# Patient Record
Sex: FEMALE | Race: WHITE | NOT HISPANIC OR LATINO | Employment: FULL TIME | URBAN - METROPOLITAN AREA
[De-identification: names, ages, dates, MRNs, and addresses within clinical notes are randomized per-mention and may not be internally consistent; named-entity substitution may affect disease eponyms.]

---

## 2017-03-10 ENCOUNTER — HOSPITAL ENCOUNTER (EMERGENCY)
Facility: CLINIC | Age: 67
Discharge: HOME | End: 2017-03-10
Attending: PHYSICIAN ASSISTANT | Admitting: PHYSICIAN ASSISTANT
Payer: COMMERCIAL

## 2017-03-10 VITALS — SYSTOLIC BLOOD PRESSURE: 184 MMHG | OXYGEN SATURATION: 98 % | DIASTOLIC BLOOD PRESSURE: 73 MMHG | TEMPERATURE: 97.7 F

## 2017-03-10 DIAGNOSIS — S33.5XXA SPRAIN OF LUMBAR REGION, INITIAL ENCOUNTER: ICD-10-CM

## 2017-03-10 PROCEDURE — 99213 OFFICE O/P EST LOW 20 MIN: CPT | Performed by: PHYSICIAN ASSISTANT

## 2017-03-10 PROCEDURE — 99212 OFFICE O/P EST SF 10 MIN: CPT

## 2017-03-10 RX ORDER — CYCLOBENZAPRINE HCL 10 MG
10 TABLET ORAL 3 TIMES DAILY PRN
Qty: 20 TABLET | Refills: 0 | Status: SHIPPED | OUTPATIENT
Start: 2017-03-10 | End: 2017-03-16

## 2017-03-10 RX ORDER — TRAMADOL HYDROCHLORIDE 50 MG/1
50-100 TABLET ORAL EVERY 6 HOURS PRN
Qty: 15 TABLET | Refills: 0 | Status: SHIPPED | OUTPATIENT
Start: 2017-03-10 | End: 2017-03-15

## 2017-03-10 NOTE — ED PROVIDER NOTES
History     Chief Complaint   Patient presents with     Back Pain     low back     HPI  Arabella Rodríguez is a 66 year old female who has 2 weeks of left lower back strained.  She was lifting a bucket back then.  She has had intermittent symptoms.  No BB sxs.  She has no fevers.  Alleve has not helped.    I have reviewed the Medications, Allergies, Past Medical and Surgical History, and Social History in the Epic system.    Review of Systems    Physical Exam   BP: 184/73  Heart Rate: 77  Temp: 97.7  F (36.5  C)  SpO2: 98 %  Physical Exam  Gen: 66 year old female appears stated age  Eyes: Equal and round  Head: NC, AT, GCS 15  ENT: Canal and nares patent  Extremity: MAEW  Skin: Warm and dry  Psych: Mood and affect normal  Neuro: CN II-XII grossly in tact  Back: ambulates on heels, toes, and squats, DTRs 2+ symmetric on the patella, LE sensation in tact, pos subjective rectal tone  ED Course     ED Course     Procedures        I think an xray at the patient's family doctor can be arranged for this week.  I will put in an order.       Labs Ordered and Resulted from Time of ED Arrival Up to the Time of Departure from the ED - No data to display    Assessments & Plan (with Medical Decision Making)     I have reviewed the nursing notes.    I have reviewed the findings, diagnosis, plan and need for follow up with the patient.    New Prescriptions    CYCLOBENZAPRINE (FLEXERIL) 10 MG TABLET    Take 1 tablet (10 mg) by mouth 3 times daily as needed for muscle spasms    TRAMADOL (ULTRAM) 50 MG TABLET    Take 1-2 tablets ( mg) by mouth every 6 hours as needed for pain       Final diagnoses:   Sprain of lumbar region, initial encounter - 2 weeks ago, left lower back       3/10/2017   [facility] EMERGENCY DEPARTMENT     Juan Gutierrez PA-C  03/10/17 [id number]

## 2017-03-10 NOTE — ED NOTES
Patient reports injuring her lower back earlier this week when pulling a bucket of water and doing housework.

## 2017-03-10 NOTE — ED AVS SNAPSHOT
[facility] Emergency Department    [address] [address]    Phone:  [phone]    Fax:  [phone]                                       Arabella Rodríguez   MRN: [id number]    Department:  [facility] Emergency Department   Date of Visit:  3/10/2017           After Visit Summary Signature Page     I have received my discharge instructions, and my questions have been answered. I have discussed any challenges I see with this plan with the nurse or doctor.    ..........................................................................................................................................  Patient/Patient Representative Signature      ..........................................................................................................................................  Patient Representative Print Name and Relationship to Patient    ..................................................               ................................................  Date                                            Time    ..........................................................................................................................................  Reviewed by Signature/Title    ...................................................              ..............................................  Date                                                            Time

## 2017-03-10 NOTE — ED NOTES
Pt injured back last week lifting. Felt better then yesterday she cleaned house and pain is worse.

## 2017-03-13 ENCOUNTER — MYC MEDICAL ADVICE (OUTPATIENT)
Dept: FAMILY MEDICINE | Facility: CLINIC | Age: 67
End: 2017-03-13

## 2017-03-13 NOTE — TELEPHONE ENCOUNTER
Patient notified.  Appt made for 3/15/17 to see RN for BP check.    BP check to be done manually per patient request.    Maria Guadalupe SALAS Rn

## 2017-03-13 NOTE — TELEPHONE ENCOUNTER
I would first follow up with a nurse visit to check her blood pressure first.  Germain Kolb MD  Family Medicine

## 2017-03-13 NOTE — TELEPHONE ENCOUNTER
Patient was in the ED 3/10/17 for back pain.  Her Bp was elevated 180/73.    Her  is a nurse and taking her BP manually at home with it being in 150's /80's range  She is taking her Losartan HCTZ 50/12.5MG tablets daily, she is concerned if her BP medication should be adjusted.  She denies elevated BP is related to pain.  Her pain from her ER visit is much better.    Please advise.    Routing to provider.    Maria Guadalupe SALAS Rn

## 2017-03-15 ENCOUNTER — OFFICE VISIT (OUTPATIENT)
Dept: FAMILY MEDICINE | Facility: CLINIC | Age: 67
End: 2017-03-15
Payer: COMMERCIAL

## 2017-03-15 ENCOUNTER — TELEPHONE (OUTPATIENT)
Dept: FAMILY MEDICINE | Facility: CLINIC | Age: 67
End: 2017-03-15

## 2017-03-15 VITALS — DIASTOLIC BLOOD PRESSURE: 84 MMHG | SYSTOLIC BLOOD PRESSURE: 164 MMHG | HEART RATE: 72 BPM

## 2017-03-15 DIAGNOSIS — I10 ESSENTIAL HYPERTENSION: Primary | ICD-10-CM

## 2017-03-15 PROCEDURE — 99207 ZZC NO CHARGE NURSE ONLY: CPT

## 2017-03-15 NOTE — PROGRESS NOTES
Pt stopped into clinic for RN blood pressure check after noticing that her home blood pressures have been trending upward.     Today's readings are 164/78, pulse of 76 and   Recheck 5 minutes later 164/84, pulse of 72.     Pt was last seen in clinic for her annual exam 4/4/16.     Routed to provider for further instructions.     Evelin Jenkins RN         BP Readings from Last 3 Encounters:   03/15/17 164/84   03/10/17 184/73   10/05/16 163/89            Lab Results   Component Value Date     CR 0.76 05/31/2016            Lab Results   Component Value Date     POTASSIUM 4.3 05/31/2016

## 2017-03-15 NOTE — TELEPHONE ENCOUNTER
"Patient is called and offered an office visit to address these problems.  appt scheduled for 4/4/17 with Dr. Kolb.  Patient is concerned of her \"high blood pressure readings\".  I have tried to comfort the patient with the fact that although these readings are elevated they are not extremely high.  Patient asked if she has headache or blurred vision.  Yes I do get headaches.  Offered her a appt with another provider to be seen for her HTN.  \"No I will wait and see Dr. Kolb\".  Josefina PARRA RN    "

## 2017-03-15 NOTE — NURSING NOTE
Pt stopped into clinic for RN blood pressure check after noticing that her home blood pressures have been trending upward.    Today's readings are 164/78, pulse of 76 and   Recheck 5 minutes later 164/84, pulse of 72.    Pt was last seen in clinic for her annual exam 4/4/16.    Routed to provider for further instructions.    Evelin Jenkins RN    BP Readings from Last 3 Encounters:   03/15/17 164/84   03/10/17 184/73   10/05/16 163/89     Lab Results   Component Value Date    CR 0.76 05/31/2016        Lab Results   Component Value Date    POTASSIUM 4.3 05/31/2016           .

## 2017-03-15 NOTE — MR AVS SNAPSHOT
After Visit Summary   3/15/2017    Arabella Rodríguez    MRN: [id number]           Patient Information     Date Of Birth          1950        Visit Information        Provider Department      3/15/2017 9:15 AM PRUDENCE POTTER/JERRY AGUIAR [facility]        Today's Diagnoses     Essential hypertension    -  1       Follow-ups after your visit        Your next 10 appointments already scheduled     Apr 04, 2017  8:20 AM CDT   SHORT with Germain Kolb MD   [facility] ([facility])    [address] [place] [address] [phone]              Who to contact     If you have questions or need follow up information about today's clinic visit or your schedule please contact [facility] directly at [phone].  Normal or non-critical lab and imaging results will be communicated to you by MyChart, letter or phone within 4 business days after the clinic has received the results. If you do not hear from us within 7 days, please contact the clinic through MyChart or phone. If you have a critical or abnormal lab result, we will notify you by phone as soon as possible.  Submit refill requests through [employer] or call your pharmacy and they will forward the refill request to us. Please allow 3 business days for your refill to be completed.          Additional Information About Your Visit        MyChart Information     [employer] gives you secure access to your electronic health record. If you see a primary care provider, you can also send messages to your care team and make appointments. If you have questions, please call your primary care clinic.  If you do not have a primary care provider, please call [phone] and they will assist you.        Care EveryWhere ID     This is your Care EveryWhere ID. This could be used by other organizations to access your [place] medical records  [id number]        Your Vitals Were     Pulse                   72             Blood Pressure from Last 3 Encounters:   03/15/17 164/84   03/10/17 184/73   10/05/16 163/89    Weight from Last 3 Encounters:   09/14/16 123 lb (55.8 kg)   06/21/16 122 lb (55.3 kg)   05/31/16 122 lb 12.8 oz (55.7 kg)              Today, you had the following     No orders found for display       Primary Care Provider Office Phone # Fax #    Germain Kolb [phone]       [facility] [address] [address]        Thank you!     Thank you for choosing [facility]  for your care. Our goal is always to provide you with excellent care. Hearing back from our patients is one way we can continue to improve our services. Please take a few minutes to complete the written survey that you may receive in the mail after your visit with us. Thank you!             Your Updated Medication List - Protect others around you: Learn how to safely use, store and throw away your medicines at www.disposemymeds.org.          This list is accurate as of: 3/15/17  1:51 PM.  Always use your most recent med list.                   Brand Name Dispense Instructions for use    CALCIUM + D PO      1 TABLET ORALLY DAILY       [employer] FISH OIL 1200 MG Cpdr          cyclobenzaprine 10 MG tablet    FLEXERIL    20 tablet    Take 1 tablet (10 mg) by mouth 3 times daily as needed for muscle spasms       fluocinonide 0.05 % cream    LIDEX    60 g    Apply sparingly to affected area twice daily as needed.  Do not apply to face.       losartan-hydrochlorothiazide 50-12.5 MG per tablet    HYZAAR    90 tablet    Take 1 tablet by mouth daily Hold on file until needed       MULTI-VITAMIN PO      1 TABLET ORALLY DAILY       valACYclovir 1000 mg tablet    VALTREX    30 tablet    If you have a cold sore use 2 tab po bid for 2 days.  If you have the rash on your leg, 0.5 po twice for 3 days.

## 2017-03-27 ENCOUNTER — OFFICE VISIT (OUTPATIENT)
Dept: FAMILY MEDICINE | Facility: CLINIC | Age: 67
End: 2017-03-27
Payer: COMMERCIAL

## 2017-03-27 VITALS
DIASTOLIC BLOOD PRESSURE: 70 MMHG | TEMPERATURE: 97.7 F | SYSTOLIC BLOOD PRESSURE: 136 MMHG | HEART RATE: 76 BPM | BODY MASS INDEX: 23.19 KG/M2 | WEIGHT: 126 LBS | HEIGHT: 62 IN

## 2017-03-27 DIAGNOSIS — Z00.00 ENCOUNTER FOR ROUTINE ADULT HEALTH EXAMINATION WITHOUT ABNORMAL FINDINGS: Primary | ICD-10-CM

## 2017-03-27 DIAGNOSIS — I10 ESSENTIAL HYPERTENSION: ICD-10-CM

## 2017-03-27 DIAGNOSIS — E78.5 HYPERLIPIDEMIA LDL GOAL <130: ICD-10-CM

## 2017-03-27 DIAGNOSIS — Z23 NEED FOR VACCINATION: ICD-10-CM

## 2017-03-27 DIAGNOSIS — Z12.31 ENCOUNTER FOR SCREENING MAMMOGRAM FOR BREAST CANCER: ICD-10-CM

## 2017-03-27 DIAGNOSIS — B00.1 COLD SORE: ICD-10-CM

## 2017-03-27 LAB
ANION GAP SERPL CALCULATED.3IONS-SCNC: 7 MMOL/L (ref 3–14)
BUN SERPL-MCNC: 15 MG/DL (ref 7–30)
CALCIUM SERPL-MCNC: 9.8 MG/DL (ref 8.5–10.1)
CHLORIDE SERPL-SCNC: 98 MMOL/L (ref 94–109)
CHOLEST SERPL-MCNC: 276 MG/DL
CO2 SERPL-SCNC: 27 MMOL/L (ref 20–32)
CREAT SERPL-MCNC: 0.77 MG/DL (ref 0.52–1.04)
GFR SERPL CREATININE-BSD FRML MDRD: 76 ML/MIN/1.7M2
GLUCOSE SERPL-MCNC: 103 MG/DL (ref 70–99)
HDLC SERPL-MCNC: 71 MG/DL
LDLC SERPL CALC-MCNC: 186 MG/DL
NONHDLC SERPL-MCNC: 205 MG/DL
POTASSIUM SERPL-SCNC: 4.2 MMOL/L (ref 3.4–5.3)
SODIUM SERPL-SCNC: 132 MMOL/L (ref 133–144)
TRIGL SERPL-MCNC: 97 MG/DL

## 2017-03-27 PROCEDURE — 80048 BASIC METABOLIC PNL TOTAL CA: CPT | Performed by: FAMILY MEDICINE

## 2017-03-27 PROCEDURE — 80061 LIPID PANEL: CPT | Performed by: FAMILY MEDICINE

## 2017-03-27 PROCEDURE — 90670 PCV13 VACCINE IM: CPT | Performed by: FAMILY MEDICINE

## 2017-03-27 PROCEDURE — 36415 COLL VENOUS BLD VENIPUNCTURE: CPT | Performed by: FAMILY MEDICINE

## 2017-03-27 PROCEDURE — 90471 IMMUNIZATION ADMIN: CPT | Performed by: FAMILY MEDICINE

## 2017-03-27 PROCEDURE — 99397 PER PM REEVAL EST PAT 65+ YR: CPT | Mod: 25 | Performed by: FAMILY MEDICINE

## 2017-03-27 PROCEDURE — 99212 OFFICE O/P EST SF 10 MIN: CPT | Mod: 25 | Performed by: FAMILY MEDICINE

## 2017-03-27 RX ORDER — VALACYCLOVIR HYDROCHLORIDE 1 G/1
TABLET, FILM COATED ORAL
Qty: 30 TABLET | Refills: 3 | Status: SHIPPED | OUTPATIENT
Start: 2017-03-27 | End: 2018-03-27

## 2017-03-27 RX ORDER — LOSARTAN POTASSIUM 50 MG/1
50 TABLET ORAL DAILY
Qty: 90 TABLET | Refills: 3 | Status: SHIPPED | OUTPATIENT
Start: 2017-03-27 | End: 2018-03-21

## 2017-03-27 RX ORDER — HYDROCHLOROTHIAZIDE 25 MG/1
25 TABLET ORAL DAILY
Qty: 90 TABLET | Refills: 3 | Status: SHIPPED | OUTPATIENT
Start: 2017-03-27 | End: 2018-03-21

## 2017-03-27 NOTE — MR AVS SNAPSHOT
After Visit Summary   3/27/2017    Arabella Rodríguez    MRN: [id number]           Patient Information     Date Of Birth          1950        Visit Information        Provider Department      3/27/2017 10:40 AM Germain Kolb MD [facility]        Today's Diagnoses     Encounter for routine adult health examination without abnormal findings    -  1    Essential hypertension        Hyperlipidemia LDL goal <130        Need for vaccination        Encounter for screening mammogram for breast cancer        Cold sore          Care Instructions    Please go to lab.    I increased your medication for your blood pressure.  You will be still taking 50 mg of losartan medication however I increased your hydrochlorothiazide to 25 mg a day.    Please make a nurse visit in 3 weeks to recheck your blood pressure.    Please get your mammogram done in the near future.          Thank you for choosing [facility].  You may be receiving a survey in the mail from [employer] regarding your visit today.  Please take a few minutes to complete and return the survey to let us know how we are doing.      If you have questions or concerns, please contact us via [employer] or you can contact your care team at [phone].    Our Clinic hours are:  Monday 6:40 am  to 7:00 pm  Tuesday -Friday 6:40 am to 5:00 pm    The [place] outpatient lab hours are:  Monday - Friday 6:10 am to 4:45 pm  Saturdays 7:00 am to 11:00 am  Appointments are required, call [phone]    If you have clinical questions after hours or would like to schedule an appointment,  call the clinic at [phone].    Preventive Health Recommendations  Female Ages 65 +    Yearly exam:     See your health care provider every year in order to  o Review health changes.   o Discuss preventive care.    o Review your medicines if your doctor has prescribed any.      You no longer need a yearly Pap test unless you've had an abnormal Pap test in the  past 10 years. If you have vaginal symptoms, such as bleeding or discharge, be sure to talk with your provider about a Pap test.      Every 1 to 2 years, have a mammogram.  If you are over 69, talk with your health care provider about whether or not you want to continue having screening mammograms.      Every 10 years, have a colonoscopy. Or, have a yearly FIT test (stool test). These exams will check for colon cancer.       Have a cholesterol test every 5 years, or more often if your doctor advises it.       Have a diabetes test (fasting glucose) every three years. If you are at risk for diabetes, you should have this test more often.       At age 65, have a bone density scan (DEXA) to check for osteoporosis (brittle bone disease).    Shots:    Get a flu shot each year.    Get a tetanus shot every 10 years.    Talk to your doctor about your pneumonia vaccines. There are now two you should receive - Pneumovax (PPSV 23) and Prevnar (PCV 13).    Talk to your doctor about the shingles vaccine.    Talk to your doctor about the hepatitis B vaccine.    Nutrition:     Eat at least 5 servings of fruits and vegetables each day.      Eat whole-grain bread, whole-wheat pasta and brown rice instead of white grains and rice.      Talk to your provider about Calcium and Vitamin D.     Lifestyle    Exercise at least 150 minutes a week (30 minutes a day, 5 days a week). This will help you control your weight and prevent disease.      Limit alcohol to one drink per day.      No smoking.       Wear sunscreen to prevent skin cancer.       See your dentist twice a year for an exam and cleaning.      See your eye doctor every 1 to 2 years to screen for conditions such as glaucoma, macular degeneration, cataracts, etc         Follow-ups after your visit        Future tests that were ordered for you today     Open Future Orders        Priority Expected Expires Ordered    *MA Screening Digital Bilateral Routine  3/27/2018 3/27/2017        "     Who to contact     If you have questions or need follow up information about today's clinic visit or your schedule please contact [facility] directly at [phone].  Normal or non-critical lab and imaging results will be communicated to you by MyChart, letter or phone within 4 business days after the clinic has received the results. If you do not hear from us within 7 days, please contact the clinic through [employer] or phone. If you have a critical or abnormal lab result, we will notify you by phone as soon as possible.  Submit refill requests through [employer] or call your pharmacy and they will forward the refill request to us. Please allow 3 business days for your refill to be completed.          Additional Information About Your Visit        [employer] Information     [employer] gives you secure access to your electronic health record. If you see a primary care provider, you can also send messages to your care team and make appointments. If you have questions, please call your primary care clinic.  If you do not have a primary care provider, please call [phone] and they will assist you.        Care EveryWhere ID     This is your Care EveryWhere ID. This could be used by other organizations to access your [place] medical records  [id number]        Your Vitals Were     Pulse Temperature Height BMI (Body Mass Index)          76 97.7  F (36.5  C) (Tympanic) 5' 2.25\" (1.581 m) 22.86 kg/m2         Blood Pressure from Last 3 Encounters:   03/27/17 136/70   03/15/17 164/84   03/10/17 184/73    Weight from Last 3 Encounters:   03/27/17 126 lb (57.2 kg)   09/14/16 123 lb (55.8 kg)   06/21/16 122 lb (55.3 kg)              We Performed the Following     1st  Administration  [21168]     Basic metabolic panel     LIPID REFLEX TO DIRECT LDL PANEL     Pneumococcal vaccine 13 valent PCV13 IM (Prevnar) [72574]          Today's Medication Changes          These changes are accurate as of: 3/27/17 11:19 AM.  If " you have any questions, ask your nurse or doctor.               Start taking these medicines.        Dose/Directions    hydrochlorothiazide 25 MG tablet   Commonly known as:  HYDRODIURIL   Used for:  Essential hypertension   Started by:  Germain Kolb MD        Dose:  25 mg   Take 1 tablet (25 mg) by mouth daily   Quantity:  90 tablet   Refills:  3       losartan 50 MG tablet   Commonly known as:  COZAAR   Used for:  Essential hypertension   Started by:  Germain Kolb MD        Dose:  50 mg   Take 1 tablet (50 mg) by mouth daily   Quantity:  90 tablet   Refills:  3         These medicines have changed or have updated prescriptions.        Dose/Directions    valACYclovir 1000 mg tablet   Commonly known as:  VALTREX   This may have changed:  additional instructions   Used for:  Cold sore   Changed by:  Germain Kolb MD        If you have a cold sore use 2 tab po bid for 2 days.  If you have the rash on your leg, 0.5 po twice for 3 days. Hold on file until needed   Quantity:  30 tablet   Refills:  3         Stop taking these medicines if you haven't already. Please contact your care team if you have questions.     losartan-hydrochlorothiazide 50-12.5 MG per tablet   Commonly known as:  HYZAAR   Stopped by:  Germain Kolb MD                Where to get your medicines      These medications were sent to [facility] [place], [place] - [address]  [address] [address]     Phone:  [phone]     hydrochlorothiazide 25 MG tablet    losartan 50 MG tablet    valACYclovir 1000 mg tablet                Primary Care Provider Office Phone # Fax #    Germain Kolb [phone]       [facility] [address] [place] [address]        Thank you!     Thank you for choosing [facility]  for your care. Our goal is always to provide you with excellent care. Hearing back from our patients is one way we can continue to improve  our services. Please take a few minutes to complete the written survey that you may receive in the mail after your visit with us. Thank you!             Your Updated Medication List - Protect others around you: Learn how to safely use, store and throw away your medicines at www.disposemymeds.org.          This list is accurate as of: 3/27/17 11:19 AM.  Always use your most recent med list.                   Brand Name Dispense Instructions for use    CALCIUM + D PO      1 TABLET ORALLY DAILY       [employer]S FISH OIL 1200 MG Cpdr          fluocinonide 0.05 % cream    LIDEX    60 g    Apply sparingly to affected area twice daily as needed.  Do not apply to face.       hydrochlorothiazide 25 MG tablet    HYDRODIURIL    90 tablet    Take 1 tablet (25 mg) by mouth daily       losartan 50 MG tablet    COZAAR    90 tablet    Take 1 tablet (50 mg) by mouth daily       MULTI-VITAMIN PO      1 TABLET ORALLY DAILY       valACYclovir 1000 mg tablet    VALTREX    30 tablet    If you have a cold sore use 2 tab po bid for 2 days.  If you have the rash on your leg, 0.5 po twice for 3 days. Hold on file until needed

## 2017-03-27 NOTE — PROGRESS NOTES
SUBJECTIVE:     CC: Arabella Rodríguez is an 66 year old woman who presents for preventive health visit.   Chief Complaint   Patient presents with     Physical       Healthy Habits:    Do you get at least three servings of calcium containing foods daily (dairy, green leafy vegetables, etc.)? no, taking calcium and/or vitamin D supplement    Amount of exercise or daily activities, outside of work: 3 day(s) per week    Problems taking medications regularly No    Medication side effects: No    Have you had an eye exam in the past two years? yes    Do you see a dentist twice per year? yes    Do you have sleep apnea, excessive snoring or daytime drowsiness?no          Today's PHQ-2 Score:   PHQ-2 ( 1999 Pf[employer]) 3/27/2017 4/4/2016   Q1: Little interest or pleasure in doing things 0 0   Q2: Feeling down, depressed or hopeless 0 0   PHQ-2 Score 0 0   Little interest or pleasure in doing things - -   Feeling down, depressed or hopeless - -   PHQ-2 Score - -       Abuse: Current or Past(Physical, Sexual or Emotional)- No  Do you feel safe in your environment - Yes    Social History   Substance Use Topics     Smoking status: Former Smoker     Packs/day: 0.50     Years: 9.00     Types: Cigarettes     Start date: 10/1/1972     Quit date: 5/1/1981     Smokeless tobacco: Never Used      Comment: I quit in 1981     Alcohol use Yes      Comment: 1 o 2 then switch  to na     The patient does not drink >3 drinks per day nor >7 drinks per week.    Recent Labs   Lab Test  04/04/16   0910  03/31/15   1015  03/11/14   0942   CHOL  246*  214*  271*   HDL  57  60  61   LDL  163*  134*  181*   TRIG  129  98  147   CHOLHDLRATIO   --   3.6  4.0   NHDL  189*   --    --      The 10-year ASCVD risk score (Yamiletguilherme JEFFRIES Jr, et al., 2013) is: 9.2%    Values used to calculate the score:      Age: 66 years      Sex: Female      Is Non- : No      Diabetic: No      Tobacco smoker: No      Systolic Blood Pressure: 136 mmHg      Is BP  "treated: Yes      HDL Cholesterol: 57 mg/dL      Total Cholesterol: 246 mg/dL  Patient is eligible for use of low-dose aspirin for primary prevention of heart attack and stroke.  Provider has discussed aspirin with patient and our decision was:     Not Indicated:  Daily low-dose aspirin recommended for primary prevention, patient not eligible.      Reviewed orders with patient.  Reviewed health maintenance and updated orders accordingly - Yes    Mammo Decision Support:  Patient over age 50, mutual decision to screen reflected in health maintenance.    Pertinent mammograms are reviewed under the imaging tab.  History of abnormal Pap smear: NO - age 30- 65 PAP every 3 years recommended    Reviewed and updated as needed this visit by clinical staff  Tobacco  Allergies  Med Hx  Surg Hx  Fam Hx  Soc Hx        Reviewed and updated as needed this visit by Provider            ROS:  Review Of Systems  Skin: negative  Eyes: negative  Ears/Nose/Throat: negative  Respiratory: No shortness of breath, dyspnea on exertion, cough, or hemoptysis  Cardiovascular: negative  Gastrointestinal: negative  Genitourinary: negative  Musculoskeletal: negative  Neurologic: negative  Psychiatric: negative  Hematologic/Lymphatic/Immunologic: negative  Endocrine: negative      Problem list, Medication list, Allergies, and Medical/Social/Surgical histories reviewed in [facility] and updated as appropriate.  OBJECTIVE:     /70 (Cuff Size: Adult Regular)  Pulse 76  Temp 97.7  F (36.5  C) (Tympanic)  Ht 5' 2.25\" (1.581 m)  Wt 126 lb (57.2 kg)  BMI 22.86 kg/m2  EXAM:  GENERAL: healthy, alert and no distress  EYES: Eyes grossly normal to inspection, PERRL and conjunctivae and sclerae normal  HENT: ear canals and TM's normal, nose and mouth without ulcers or lesions  NECK: no adenopathy, no asymmetry, masses, or scars and thyroid normal to palpation  RESP: lungs clear to auscultation - no rales, rhonchi or wheezes  BREAST: normal without " masses, tenderness or nipple discharge and no palpable axillary masses or adenopathy  CV: regular rate and rhythm, normal S1 S2, no S3 or S4, no murmur, click or rub, no peripheral edema and peripheral pulses strong  ABDOMEN: soft, nontender, no hepatosplenomegaly, no masses and bowel sounds normal   (female): not examined  MS: no gross musculoskeletal defects noted, no edema  SKIN: no suspicious lesions or rashes  NEURO: Normal strength and tone, mentation intact and speech normal  PSYCH: mentation appears normal, affect normal/bright  LYMPH: anterior cervical: no adenopathy  posterior cervical: no adenopathy    ASSESSMENT/PLAN:     (Z00.00) Encounter for routine adult health examination without abnormal findings  (primary encounter diagnosis)  Comment: Discussed healthy lifestyle and preventative cares.    Plan:     (I10) Essential hypertension  Comment: not ideally controlled and per her home readings higher, recommend to increase med to help with better control and follow up with nurse visit. If not controlled consider increasing losartan to 75 mg a day.  Plan: Basic metabolic panel, losartan (COZAAR) 50 MG         tablet, hydrochlorothiazide (HYDRODIURIL) 25 MG        tablet            (E78.5) Hyperlipidemia LDL goal <130  Comment:   Plan: LIPID REFLEX TO DIRECT LDL PANEL            (Z23) Need for vaccination  Comment:   Plan: 1st  Administration  [29810], Pneumococcal         vaccine 13 valent PCV13 IM (Prevnar) [55352]            (Z12.31) Encounter for screening mammogram for breast cancer  Comment:   Plan: *MA Screening Digital Bilateral            (B00.1) Cold sore  Comment: refilled med  Plan: valACYclovir (VALTREX) 1000 mg tablet              COUNSELING:   Reviewed preventive health counseling, as reflected in patient instructions       Regular exercise       Healthy diet/nutrition       Vision screening       Hearing screening       Colon cancer screening         reports that she quit smoking about 35  "years ago. Her smoking use included Cigarettes. She started smoking about 44 years ago. She has a 4.50 pack-year smoking history. She has never used smokeless tobacco.    Estimated body mass index is 22.86 kg/(m^2) as calculated from the following:    Height as of this encounter: 5' 2.25\" (1.581 m).    Weight as of this encounter: 126 lb (57.2 kg).       Counseling Resources:  ATP IV Guidelines  Pooled Cohorts Equation Calculator  Breast Cancer Risk Calculator  FRAX Risk Assessment  ICSI Preventive Guidelines  Dietary Guidelines for Americans, 2010  USDA's MyPlate  ASA Prophylaxis  Lung CA Screening    Germain Kolb MD  [facility]  "

## 2017-03-27 NOTE — PATIENT INSTRUCTIONS
Please go to lab.    I increased your medication for your blood pressure.  You will be still taking 50 mg of losartan medication however I increased your hydrochlorothiazide to 25 mg a day.    Please make a nurse visit in 3 weeks to recheck your blood pressure.    Please get your mammogram done in the near future.          Thank you for choosing [facility].  You may be receiving a survey in the mail from Vitor Boyer regarding your visit today.  Please take a few minutes to complete and return the survey to let us know how we are doing.      If you have questions or concerns, please contact us via E-House or you can contact your care team at [phone].    Our Clinic hours are:  Monday 6:40 am  to 7:00 pm  Tuesday -Friday 6:40 am to 5:00 pm    The [place] outpatient lab hours are:  Monday - Friday 6:10 am to 4:45 pm  Saturdays 7:00 am to 11:00 am  Appointments are required, call [phone]    If you have clinical questions after hours or would like to schedule an appointment,  call the clinic at [phone].    Preventive Health Recommendations  Female Ages 65 +    Yearly exam:     See your health care provider every year in order to  o Review health changes.   o Discuss preventive care.    o Review your medicines if your doctor has prescribed any.      You no longer need a yearly Pap test unless you've had an abnormal Pap test in the past 10 years. If you have vaginal symptoms, such as bleeding or discharge, be sure to talk with your provider about a Pap test.      Every 1 to 2 years, have a mammogram.  If you are over 69, talk with your health care provider about whether or not you want to continue having screening mammograms.      Every 10 years, have a colonoscopy. Or, have a yearly FIT test (stool test). These exams will check for colon cancer.       Have a cholesterol test every 5 years, or more often if your doctor advises it.       Have a diabetes test (fasting glucose) every three years. If you  are at risk for diabetes, you should have this test more often.       At age 65, have a bone density scan (DEXA) to check for osteoporosis (brittle bone disease).    Shots:    Get a flu shot each year.    Get a tetanus shot every 10 years.    Talk to your doctor about your pneumonia vaccines. There are now two you should receive - Pneumovax (PPSV 23) and Prevnar (PCV 13).    Talk to your doctor about the shingles vaccine.    Talk to your doctor about the hepatitis B vaccine.    Nutrition:     Eat at least 5 servings of fruits and vegetables each day.      Eat whole-grain bread, whole-wheat pasta and brown rice instead of white grains and rice.      Talk to your provider about Calcium and Vitamin D.     Lifestyle    Exercise at least 150 minutes a week (30 minutes a day, 5 days a week). This will help you control your weight and prevent disease.      Limit alcohol to one drink per day.      No smoking.       Wear sunscreen to prevent skin cancer.       See your dentist twice a year for an exam and cleaning.      See your eye doctor every 1 to 2 years to screen for conditions such as glaucoma, macular degeneration, cataracts, etc

## 2017-03-27 NOTE — NURSING NOTE
"Chief Complaint   Patient presents with     Physical       Initial /78 (Cuff Size: Adult Regular)  Pulse 76  Temp 97.7  F (36.5  C) (Tympanic)  Ht 5' 2.25\" (1.581 m)  Wt 126 lb (57.2 kg)  BMI 22.86 kg/m2 Estimated body mass index is 22.86 kg/(m^2) as calculated from the following:    Height as of this encounter: 5' 2.25\" (1.581 m).    Weight as of this encounter: 126 lb (57.2 kg).  Medication Reconciliation: complete   ANT [facility] ([facility])  Screening Questionnaire for Adult Immunization    Are you sick today?   No   Do you have allergies to medications, food, a vaccine component or latex?   No   Have you ever had a serious reaction after receiving a vaccination?   No   Do you have a long-term health problem with heart disease, lung disease, asthma, kidney disease, metabolic disease (e.g. diabetes), anemia, or other blood disorder?   No   Do you have cancer, leukemia, HIV/AIDS, or any other immune system problem?   No   In the past 3 months, have you taken medications that affect  your immune system, such as prednisone, other steroids, or anticancer drugs; drugs for the treatment of rheumatoid arthritis, Crohn s disease, or psoriasis; or have you had radiation treatments?   No   Have you had a seizure, or a brain or other nervous system problem?   No   During the past year, have you received a transfusion of blood or blood     products, or been given immune (gamma) globulin or antiviral drug?   No   For women: Are you pregnant or is there a chance you could become        pregnant during the next month?   No   Have you received any vaccinations in the past 4 weeks?   No     Immunization questionnaire answers were all negative.      Per orders of Dr. Kolb, injection of PCV13 given by Maria E Clemente. Patient instructed to remain in clinic for 20 minutes afterwards, and to report any adverse reaction to me immediately.       Screening performed by Maria E Clemente on 3/27/2017 at 10:47 AM.    "

## 2017-04-17 ENCOUNTER — ALLIED HEALTH/NURSE VISIT (OUTPATIENT)
Dept: FAMILY MEDICINE | Facility: CLINIC | Age: 67
End: 2017-04-17
Payer: COMMERCIAL

## 2017-04-17 VITALS — SYSTOLIC BLOOD PRESSURE: 144 MMHG | HEART RATE: 68 BPM | DIASTOLIC BLOOD PRESSURE: 72 MMHG

## 2017-04-17 DIAGNOSIS — I10 ESSENTIAL HYPERTENSION: Primary | ICD-10-CM

## 2017-04-17 PROCEDURE — 99207 ZZC NO CHARGE NURSE ONLY: CPT

## 2017-04-17 NOTE — NURSING NOTE
Pt returns for RN blood pressure recheck.  Today's reading is 156/70, pulse of 72.  Recheck 3 min later is 145/72, pulse of 68.    Will route to PCP in a telephone note for further instructions since pt remains above goal.    BP Readings from Last 6 Encounters:   04/17/17 144/72   03/27/17 136/70   03/15/17 164/84   03/10/17 184/73   10/05/16 163/89   09/14/16 120/80     Lab Results   Component Value Date    CR 0.77 03/27/2017     Lab Results   Component Value Date    POTASSIUM 4.2 03/27/2017

## 2017-04-17 NOTE — MR AVS SNAPSHOT
After Visit Summary   4/17/2017    Arabella Rodríguez    MRN: [id number]           Patient Information     Date Of Birth          1950        Visit Information        Provider Department      4/17/2017 1:45 PM PRUDENCE ADAMS FP/IM RN [facility]        Today's Diagnoses     Essential hypertension    -  1       Follow-ups after your visit        Your next 10 appointments already scheduled     Apr 17, 2017  1:45 PM CDT   Nurse Only with [facility]IM RN   [facility]    [address] [id number] [phone]              Who to contact     If you have questions or need follow up information about today's clinic visit or your schedule please contact [facility] directly at [phone].  Normal or non-critical lab and imaging results will be communicated to you by MyChart, letter or phone within 4 business days after the clinic has received the results. If you do not hear from us within 7 days, please contact the clinic through MyChart or phone. If you have a critical or abnormal lab result, we will notify you by phone as soon as possible.  Submit refill requests through [employer] or call your pharmacy and they will forward the refill request to us. Please allow 3 business days for your refill to be completed.          Additional Information About Your Visit        MyChart Information     Litepoint gives you secure access to your electronic health record. If you see a primary care provider, you can also send messages to your care team and make appointments. If you have questions, please call your primary care clinic.  If you do not have a primary care provider, please call [phone] and they will assist you.        Care EveryWhere ID     This is your Care EveryWhere ID. This could be used by other organizations to access your [place] medical records  [id number]        Your Vitals Were     Pulse                   68             Blood Pressure from Last 3 Encounters:   04/17/17 144/72   03/27/17 136/70   03/15/17 164/84    Weight from Last 3 Encounters:   03/27/17 126 lb (57.2 kg)   09/14/16 123 lb (55.8 kg)   06/21/16 122 lb (55.3 kg)              Today, you had the following     No orders found for display       Primary Care Provider Office Phone # Fax #    Germain Kolb [phone]       [facility] [address] [address]        Thank you!     Thank you for choosing [facility]  for your care. Our goal is always to provide you with excellent care. Hearing back from our patients is one way we can continue to improve our services. Please take a few minutes to complete the written survey that you may receive in the mail after your visit with us. Thank you!             Your Updated Medication List - Protect others around you: Learn how to safely use, store and throw away your medicines at www.disposemymeds.org.          This list is accurate as of: 4/17/17  1:26 PM.  Always use your most recent med list.                   Brand Name Dispense Instructions for use    CALCIUM + D PO      1 TABLET ORALLY DAILY       [employer] FISH OIL 1200 MG Cpdr          fluocinonide 0.05 % cream    LIDEX    60 g    Apply sparingly to affected area twice daily as needed.  Do not apply to face.       hydrochlorothiazide 25 MG tablet    HYDRODIURIL    90 tablet    Take 1 tablet (25 mg) by mouth daily       losartan 50 MG tablet    COZAAR    90 tablet    Take 1 tablet (50 mg) by mouth daily       MULTI-VITAMIN PO      1 TABLET ORALLY DAILY       valACYclovir 1000 mg tablet    VALTREX    30 tablet    If you have a cold sore use 2 tab po bid for 2 days.  If you have the rash on your leg, 0.5 po twice for 3 days. Hold on file until needed

## 2017-12-20 ENCOUNTER — OFFICE VISIT (OUTPATIENT)
Dept: DERMATOLOGY | Facility: CLINIC | Age: 67
End: 2017-12-20
Payer: COMMERCIAL

## 2017-12-20 ENCOUNTER — OFFICE VISIT (OUTPATIENT)
Dept: FAMILY MEDICINE | Facility: CLINIC | Age: 67
End: 2017-12-20
Payer: COMMERCIAL

## 2017-12-20 VITALS — HEART RATE: 78 BPM | SYSTOLIC BLOOD PRESSURE: 152 MMHG | OXYGEN SATURATION: 97 % | DIASTOLIC BLOOD PRESSURE: 75 MMHG

## 2017-12-20 VITALS
SYSTOLIC BLOOD PRESSURE: 122 MMHG | HEART RATE: 76 BPM | HEIGHT: 62 IN | BODY MASS INDEX: 21.75 KG/M2 | OXYGEN SATURATION: 99 % | DIASTOLIC BLOOD PRESSURE: 74 MMHG | TEMPERATURE: 98.1 F | WEIGHT: 118.2 LBS

## 2017-12-20 DIAGNOSIS — D18.00 ANGIOMA: ICD-10-CM

## 2017-12-20 DIAGNOSIS — R10.11 ABDOMINAL DISCOMFORT IN RIGHT UPPER QUADRANT: Primary | ICD-10-CM

## 2017-12-20 DIAGNOSIS — L82.1 SK (SEBORRHEIC KERATOSIS): Primary | ICD-10-CM

## 2017-12-20 DIAGNOSIS — D22.9 DERMAL AND EPIDERMAL NEVUS: ICD-10-CM

## 2017-12-20 DIAGNOSIS — L81.4 LENTIGO: ICD-10-CM

## 2017-12-20 LAB
ALBUMIN SERPL-MCNC: 4.6 G/DL (ref 3.4–5)
ALP SERPL-CCNC: 53 U/L (ref 40–150)
ALT SERPL W P-5'-P-CCNC: 25 U/L (ref 0–50)
AST SERPL W P-5'-P-CCNC: 26 U/L (ref 0–45)
BASOPHILS # BLD AUTO: 0 10E9/L (ref 0–0.2)
BASOPHILS NFR BLD AUTO: 0.5 %
BILIRUB DIRECT SERPL-MCNC: 0.1 MG/DL (ref 0–0.2)
BILIRUB SERPL-MCNC: 0.6 MG/DL (ref 0.2–1.3)
DIFFERENTIAL METHOD BLD: NORMAL
EOSINOPHIL # BLD AUTO: 0.1 10E9/L (ref 0–0.7)
EOSINOPHIL NFR BLD AUTO: 1.2 %
ERYTHROCYTE [DISTWIDTH] IN BLOOD BY AUTOMATED COUNT: 11.7 % (ref 10–15)
HCT VFR BLD AUTO: 38.6 % (ref 35–47)
HGB BLD-MCNC: 13.3 G/DL (ref 11.7–15.7)
LYMPHOCYTES # BLD AUTO: 2.2 10E9/L (ref 0.8–5.3)
LYMPHOCYTES NFR BLD AUTO: 29 %
MCH RBC QN AUTO: 30.6 PG (ref 26.5–33)
MCHC RBC AUTO-ENTMCNC: 34.5 G/DL (ref 31.5–36.5)
MCV RBC AUTO: 89 FL (ref 78–100)
MONOCYTES # BLD AUTO: 0.7 10E9/L (ref 0–1.3)
MONOCYTES NFR BLD AUTO: 8.9 %
NEUTROPHILS # BLD AUTO: 4.5 10E9/L (ref 1.6–8.3)
NEUTROPHILS NFR BLD AUTO: 60.4 %
PLATELET # BLD AUTO: 306 10E9/L (ref 150–450)
PROT SERPL-MCNC: 8 G/DL (ref 6.8–8.8)
RBC # BLD AUTO: 4.34 10E12/L (ref 3.8–5.2)
WBC # BLD AUTO: 7.5 10E9/L (ref 4–11)

## 2017-12-20 PROCEDURE — 36415 COLL VENOUS BLD VENIPUNCTURE: CPT | Performed by: FAMILY MEDICINE

## 2017-12-20 PROCEDURE — 99214 OFFICE O/P EST MOD 30 MIN: CPT | Performed by: FAMILY MEDICINE

## 2017-12-20 PROCEDURE — 99213 OFFICE O/P EST LOW 20 MIN: CPT | Performed by: DERMATOLOGY

## 2017-12-20 PROCEDURE — 85025 COMPLETE CBC W/AUTO DIFF WBC: CPT | Performed by: FAMILY MEDICINE

## 2017-12-20 PROCEDURE — 80076 HEPATIC FUNCTION PANEL: CPT | Performed by: FAMILY MEDICINE

## 2017-12-20 NOTE — PROGRESS NOTES
Arabella Rodríguez is a 67 year old year old female patient here today for brown spot son trunk.  She denies any new or changing lesions.  Patient reports the following previous treatments none.  Patient reports the following modifying factors none.  Associated symptoms: none.  Patient has no other skin complaints today.  Remainder of the HPI, Meds, PMH, Allergies, FH, and SH was reviewed in chart.      Past Medical History:   Diagnosis Date     Hypertension      Osteopenia      Thyroid nodules [facility]     benign        Past Surgical History:   Procedure Laterality Date     ABDOMEN SURGERY  1986         BIOPSY  2014    Thyroid     C ANESTH, SECTION       COLONOSCOPY       EXCISE MASS WRIST Left 2016    Procedure: EXCISE MASS WRIST;  Surgeon: Germain Hull MD;  Location: [facility] OR     TONSILLECTOMY          Family History   Problem Relation Age of Onset     Lipids Mother      Arthritis Mother      Hypertension Mother      Hyperlipidemia Mother      Osteopenia Mother      Hypertension Father      Hyperlipidemia Father      Prostate Cancer Father      Hypertension Paternal Grandfather      Neurologic Disorder Brother      brain tumor     Other Cancer Brother       Brain Surgery/ Brain Surgery     Depression Sister      early      Depression Niece      DIABETES No family hx of        Social History     Social History     Marital status:      Spouse name: N/A     Number of children: N/A     Years of education: N/A     Occupational History      [employer]     Social History Main Topics     Smoking status: Former Smoker     Packs/day: 0.50     Years: 9.00     Types: Cigarettes     Start date: 10/1/1972     Quit date: 1981     Smokeless tobacco: Never Used      Comment: I quit in      Alcohol use Yes      Comment: 1 o 2 then switch  to na     Drug use: No     Sexual activity: Not Currently     Partners: Male     Other Topics Concern      Parent/Sibling W/ Cabg, Mi Or Angioplasty Before 65f 55m? No      Service No     Blood Transfusions No     Caffeine Concern Yes     3 cups cofffee a day     Occupational Exposure No     Hobby Hazards No     Sleep Concern No     Stress Concern No     Weight Concern No     Special Diet Yes     calcium with D one a day     Back Care No     Exercise Yes     couple times a wk     Bike Helmet No     Seat Belt Yes     Self-Exams No     Social History Narrative       Outpatient Encounter Prescriptions as of 12/20/2017   Medication Sig Dispense Refill     losartan (COZAAR) 50 MG tablet Take 1 tablet (50 mg) by mouth daily 90 tablet 3     hydrochlorothiazide (HYDRODIURIL) 25 MG tablet Take 1 tablet (25 mg) by mouth daily 90 tablet 3     Omega-3 Fatty Acids ([employer]S FISH OIL) 1200 MG CPDR Take 1 capsule by mouth daily        CALCIUM + D OR 1 TABLET ORALLY DAILY       MULTI-VITAMIN OR 1 TABLET ORALLY DAILY       valACYclovir (VALTREX) 1000 mg tablet If you have a cold sore use 2 tab po bid for 2 days.  If you have the rash on your leg, 0.5 po twice for 3 days. Hold on file until needed (Patient not taking: Reported on 12/20/2017) 30 tablet 3     fluocinonide (LIDEX) 0.05 % cream Apply sparingly to affected area twice daily as needed.  Do not apply to face. (Patient not taking: Reported on 12/20/2017) 60 g 1     No facility-administered encounter medications on file as of 12/20/2017.              Review Of Systems  Skin: As above  Eyes: negative  Ears/Nose/Throat: negative  Respiratory: No shortness of breath, dyspnea on exertion, cough, or hemoptysis  Cardiovascular: negative  Gastrointestinal: negative  Genitourinary: negative  Musculoskeletal: negative  Neurologic: negative  Psychiatric: negative  Hematologic/Lymphatic/Immunologic: negative  Endocrine: negative      O:   NAD, WDWN, Alert & Oriented, Mood & Affect wnl, Vitals stable   Here today alone   /75  Pulse 78  SpO2 97%   General appearance normal   Vitals  stable   Alert, oriented and in no acute distress      Following lymph nodes palpated: Occipital, Cervical, Supraclavicular no lad   Stuck on papules and brown macules on trunk and ext    Red papules on trunk and ext    Flesh colored papules on trunk         The remainder of the full exam was unremarkable; the following areas were examined:  conjunctiva/lids, oral mucosa, neck, peripheral vascular system, abdomen, lymph nodes, digits/nails, eccrine and apocrine glands, scalp/hair, face, neck, chest, abdomen, buttocks, back, RUE, LUE, RLE, LLE       Eyes: Conjunctivae/lids:Normal     ENT: Lips, buccal mucosa, tongue: normal    MSK:Normal    Cardiovascular: peripheral edema none    Pulm: Breathing Normal    Lymph Nodes: No Head and Neck Lymphadenopathy     Neuro/Psych: Orientation:Normal; Mood/Affect:Normal      A/P:  1. Seborrheic keratosis, lentigo, angioma, dermal nevus  BENIGN LESIONS DISCUSSED WITH PATIENT:  I discussed the specifics of tumor, prognosis, and genetics of benign lesions.  I explained that treatment of these lesions would be purely cosmetic and not medically neccessary.  I discussed with patient different removal options including excision, cautery and /or laser.      Nature and genetics of benign skin lesions dicussed with patient.  Signs and Symptoms of skin cancer discussed with patient.  Patient encouraged to perform monthly skin exams.  UV precautions reviewed with patient.  Skin care regimen reviewed with patient: Eliminate harsh soaps, i.e. Dial, zest, irsih spring; Mild soaps such as Cetaphil or Dove sensitive skin, avoid hot or cold showers, aggressive use of emollients including vanicream, cetaphil or cerave discussed with patient.    Risks of non-melanoma skin cancer discussed with patient   Return to clinic 12 months

## 2017-12-20 NOTE — NURSING NOTE
"Chief Complaint   Patient presents with     Rib Pain     Health Maintenance     reminded will be due for pappe in March        Initial /72 (BP Location: Right arm, Patient Position: Chair, Cuff Size: Adult Regular)  Pulse 76  Temp 98.1  F (36.7  C) (Tympanic)  Ht 5' 2.25\" (1.581 m)  Wt 118 lb 3.2 oz (53.6 kg)  SpO2 99%  BMI 21.45 kg/m2 Estimated body mass index is 21.45 kg/(m^2) as calculated from the following:    Height as of this encounter: 5' 2.25\" (1.581 m).    Weight as of this encounter: 118 lb 3.2 oz (53.6 kg).  Medication Reconciliation: complete    "

## 2017-12-20 NOTE — MR AVS SNAPSHOT
After Visit Summary   12/20/2017    Arabella Rodríguez    MRN: [id number]           Patient Information     Date Of Birth          1950        Visit Information        Provider Department      12/20/2017 9:20 AM Germain Kolb MD [facility]        Today's Diagnoses     Abdominal discomfort in right upper quadrant    -  1      Care Instructions    Please go to lab.    Please get an ultra sounds of the abdomen.          Thank you for choosing [facility].  You may be receiving a survey in the mail from [facility] regarding your visit today.  Please take a few minutes to complete and return the survey to let us know how we are doing.      If you have questions or concerns, please contact us via Blackboard or you can contact your care team at [phone].    Our Clinic hours are:  Monday 6:40 am  to 7:00 pm  Tuesday -Friday 6:40 am to 5:00 pm    The [place] outpatient lab hours are:  Monday - Friday 6:10 am to 4:45 pm  Saturdays 7:00 am to 11:00 am  Appointments are required, call [phone]    If you have clinical questions after hours or would like to schedule an appointment,  call the clinic at [phone].            Follow-ups after your visit        Your next 10 appointments already scheduled     Dec 20, 2017 10:15 AM CST   Return Visit with Elkin Hooks MD   [facility] ([facility]    [address] [id number] [phone]              Future tests that were ordered for you today     Open Future Orders        Priority Expected Expires Ordered    US Abdomen Complete Routine  12/20/2018 12/20/2017            Who to contact     If you have questions or need follow up information about today's clinic visit or your schedule please contact [facility] directly at [phone].  Normal or non-critical lab and imaging results will be communicated to you by MyChart, letter or phone within 4 business days  "after the clinic has received the results. If you do not hear from us within 7 days, please contact the clinic through [employer] or phone. If you have a critical or abnormal lab result, we will notify you by phone as soon as possible.  Submit refill requests through [employer] or call your pharmacy and they will forward the refill request to us. Please allow 3 business days for your refill to be completed.          Additional Information About Your Visit        [employer] Information     [employer] gives you secure access to your electronic health record. If you see a primary care provider, you can also send messages to your care team and make appointments. If you have questions, please call your primary care clinic.  If you do not have a primary care provider, please call [phone] and they will assist you.        Care EveryWhere ID     This is your Care EveryWhere ID. This could be used by other organizations to access your [place] medical records  [id number]        Your Vitals Were     Pulse Temperature Height Pulse Oximetry BMI (Body Mass Index)       76 98.1  F (36.7  C) (Tympanic) 5' 2.25\" (1.581 m) 99% 21.45 kg/m2        Blood Pressure from Last 3 Encounters:   12/20/17 122/74   04/17/17 144/72   03/27/17 136/70    Weight from Last 3 Encounters:   12/20/17 118 lb 3.2 oz (53.6 kg)   03/27/17 126 lb (57.2 kg)   09/14/16 123 lb (55.8 kg)              We Performed the Following     CBC with platelets differential     Hepatic panel        Primary Care Provider Office Phone # Fax #    Germain Kolb [phone] [address] [address]        Equal Access to Services     DEEJAY THRASHER : Hadii joanna Styles, darin yang, qamaria esther holguin. So [facility] [phone].    ATENCIÓN: Si habla español, tiene a veras disposición servicios gratuitos de asistencia lingüística. Llame al [phone].    We comply with applicable federal " civil rights laws and [place] laws. We do not discriminate on the basis of race, color, national origin, age, disability, sex, sexual orientation, or gender identity.            Thank you!     Thank you for choosing [facility]  for your care. Our goal is always to provide you with excellent care. Hearing back from our patients is one way we can continue to improve our services. Please take a few minutes to complete the written survey that you may receive in the mail after your visit with us. Thank you!             Your Updated Medication List - Protect others around you: Learn how to safely use, store and throw away your medicines at www.disposemymeds.org.          This list is accurate as of: 12/20/17 10:00 AM.  Always use your most recent med list.                   Brand Name Dispense Instructions for use Diagnosis    CALCIUM + D PO      1 TABLET ORALLY DAILY        CVS FISH OIL 1200 MG Cpdr      Take 1 capsule by mouth daily        fluocinonide 0.05 % cream    LIDEX    60 g    Apply sparingly to affected area twice daily as needed.  Do not apply to face.    Nummular dermatitis       hydrochlorothiazide 25 MG tablet    HYDRODIURIL    90 tablet    Take 1 tablet (25 mg) by mouth daily    Essential hypertension       losartan 50 MG tablet    COZAAR    90 tablet    Take 1 tablet (50 mg) by mouth daily    Essential hypertension       MULTI-VITAMIN PO      1 TABLET ORALLY DAILY        valACYclovir 1000 mg tablet    VALTREX    30 tablet    If you have a cold sore use 2 tab po bid for 2 days.  If you have the rash on your leg, 0.5 po twice for 3 days. Hold on file until needed    Cold sore

## 2017-12-20 NOTE — PATIENT INSTRUCTIONS
Please go to lab.    Please get an ultra sounds of the abdomen.          Thank you for choosing [facility].  You may be receiving a survey in the mail from [facility] regarding your visit today.  Please take a few minutes to complete and return the survey to let us know how we are doing.      If you have questions or concerns, please contact us via [email or web address] or you can contact your care team at [phone].    Our Clinic hours are:  Monday 6:40 am  to 7:00 pm  Tuesday -Friday 6:40 am to 5:00 pm    The [place] outpatient lab hours are:  Monday - Friday 6:10 am to 4:45 pm  Saturdays 7:00 am to 11:00 am  Appointments are required, call [phone]    If you have clinical questions after hours or would like to schedule an appointment,  call the clinic at [phone].

## 2017-12-20 NOTE — PROGRESS NOTES
"  SUBJECTIVE:   Arabella Rodríguez is a 67 year old female who presents to clinic today for the following health issues:  Chief Complaint   Patient presents with     Rib Pain     Health Maintenance     reminded will be due for pappe in March          Concern - Right Side Discomfort Under Rib Area   Onset: Mid October 2017    Description:   Discomfort in Right Upper Inner Rib Area  when sitting down , \" Feels like there is \"something\" extra there that was not there before\"    Intensity: mild    Progression of Symptoms:  same    Accompanying Signs & Symptoms:  Discomfort in the area when sitting down     Previous history of similar problem:   None     Precipitating factors:   Worsened by: none     Alleviating factors:  Improved by: better when standing or laying  Flat     Therapies Tried and outcome: none     Feels like something is under right rib, points to the mid clavicular line.  No urine changes.  No problems with eating or drinking.  No skin changes like jaundice.  No fever or chills.  She has tried to lose some weight.         Problem list and histories reviewed & adjusted, as indicated.  Additional history: as documented        Reviewed and updated as needed this visit by clinical staffTobacco  Allergies  Meds  Med Hx  Surg Hx  Fam Hx  Soc Hx      Reviewed and updated as needed this visit by Provider         ROS:  CONSTITUTIONAL:NEGATIVE for fever, chills, change in weight  INTEGUMENTARY/SKIN: NEGATIVE for worrisome rashes, moles or lesions  EYES: NEGATIVE for vision changes or irritation  RESP:NEGATIVE for significant cough or SOB  CV: NEGATIVE for chest pain, palpitations or peripheral edema  GI: RUQ discomfort, feeling like there is a mass there.  Just resolved some constipation after taking some medication.  MUSCULOSKELETAL: NEGATIVE for significant arthralgias or myalgia  NEURO: NEGATIVE for weakness, dizziness or paresthesias  PSYCHIATRIC: NEGATIVE for changes in mood or affect    OBJECTIVE:         " "                                           /74 (BP Location: Right arm, Patient Position: Chair, Cuff Size: Adult Regular)  Pulse 76  Temp 98.1  F (36.7  C) (Tympanic)  Ht 5' 2.25\" (1.581 m)  Wt 118 lb 3.2 oz (53.6 kg)  SpO2 99%  BMI 21.45 kg/m2  Body mass index is 21.45 kg/(m^2).  GENERAL APPEARANCE: healthy, alert and no distress  EYES: Eyes grossly normal to inspection, PERRL and conjunctivae and sclerae normal  RESP: lungs clear to auscultation - no rales, rhonchi or wheezes  CV: regular rates and rhythm, normal S1 S2, no S3 or S4 and no murmur, click or rub  ABDOMEN: soft, nontender, without hepatosplenomegaly or masses and bowel sounds normal  MS: extremities normal- no gross deformities noted  SKIN: no suspicious lesions or rashes  NEURO: Normal strength and tone, mentation intact and speech normal  PSYCH: mentation appears normal and affect normal/bright         ASSESSMENT/PLAN:                                                    1. Abdominal discomfort in right upper quadrant  Unclear etiology, check labs, check US.  If all negative consider abdomen CT with contrast.    - US Abdomen Complete; Future  - CBC with platelets differential  - Hepatic panel      See Patient Instructions    Germain Kolb MD  [facility]  "

## 2017-12-20 NOTE — NURSING NOTE
"Initial /75  Pulse 78  SpO2 97% Estimated body mass index is 21.45 kg/(m^2) as calculated from the following:    Height as of an earlier encounter on 12/20/17: 1.581 m (5' 2.25\").    Weight as of an earlier encounter on 12/20/17: 53.6 kg (118 lb 3.2 oz). .      "

## 2017-12-20 NOTE — LETTER
2017         RE: Arabella Rodríguez  [address] [address]        Dear Colleague,    Thank you for referring your patient, Arabella Rodríguez, to the [facility]. Please see a copy of my visit note below.    Arabella Rodríguez is a 67 year old year old female patient here today for brown spot son trunk.  She denies any new or changing lesions.  Patient reports the following previous treatments none.  Patient reports the following modifying factors none.  Associated symptoms: none.  Patient has no other skin complaints today.  Remainder of the HPI, Meds, PMH, Allergies, FH, and SH was reviewed in chart.      Past Medical History:   Diagnosis Date     Hypertension      Osteopenia      Thyroid nodules [facility]     benign        Past Surgical History:   Procedure Laterality Date     ABDOMEN SURGERY  1986         BIOPSY  2014    Thyroid     C ANESTH, SECTION       COLONOSCOPY       EXCISE MASS WRIST Left 2016    Procedure: EXCISE MASS WRIST;  Surgeon: Germain Hull MD;  Location: [facility] OR     TONSILLECTOMY          Family History   Problem Relation Age of Onset     Lipids Mother      Arthritis Mother      Hypertension Mother      Hyperlipidemia Mother      Osteopenia Mother      Hypertension Father      Hyperlipidemia Father      Prostate Cancer Father      Hypertension Paternal Grandfather      Neurologic Disorder Brother      brain tumor     Other Cancer Brother       Brain Surgery/ Brain Surgery     Depression Sister      early      Depression Niece      DIABETES No family hx of        Social History     Social History     Marital status:      Spouse name: N/A     Number of children: N/A     Years of education: N/A     Occupational History      [employer]     Social History Main Topics     Smoking status: Former Smoker     Packs/day: 0.50     Years: 9.00     Types: Cigarettes     Start date:  10/1/1972     Quit date: 5/1/1981     Smokeless tobacco: Never Used      Comment: I quit in 1981     Alcohol use Yes      Comment: 1 o 2 then switch  to na     Drug use: No     Sexual activity: Not Currently     Partners: Male     Other Topics Concern     Parent/Sibling W/ Cabg, Mi Or Angioplasty Before 65f 55m? No      Service No     Blood Transfusions No     Caffeine Concern Yes     3 cups cofffee a day     Occupational Exposure No     Hobby Hazards No     Sleep Concern No     Stress Concern No     Weight Concern No     Special Diet Yes     calcium with D one a day     Back Care No     Exercise Yes     couple times a wk     Bike Helmet No     Seat Belt Yes     Self-Exams No     Social History Narrative       Outpatient Encounter Prescriptions as of 12/20/2017   Medication Sig Dispense Refill     losartan (COZAAR) 50 MG tablet Take 1 tablet (50 mg) by mouth daily 90 tablet 3     hydrochlorothiazide (HYDRODIURIL) 25 MG tablet Take 1 tablet (25 mg) by mouth daily 90 tablet 3     Omega-3 Fatty Acids (CVS FISH OIL) 1200 MG CPDR Take 1 capsule by mouth daily        CALCIUM + D OR 1 TABLET ORALLY DAILY       MULTI-VITAMIN OR 1 TABLET ORALLY DAILY       valACYclovir (VALTREX) 1000 mg tablet If you have a cold sore use 2 tab po bid for 2 days.  If you have the rash on your leg, 0.5 po twice for 3 days. Hold on file until needed (Patient not taking: Reported on 12/20/2017) 30 tablet 3     fluocinonide (LIDEX) 0.05 % cream Apply sparingly to affected area twice daily as needed.  Do not apply to face. (Patient not taking: Reported on 12/20/2017) 60 g 1     No facility-administered encounter medications on file as of 12/20/2017.              Review Of Systems  Skin: As above  Eyes: negative  Ears/Nose/Throat: negative  Respiratory: No shortness of breath, dyspnea on exertion, cough, or hemoptysis  Cardiovascular: negative  Gastrointestinal: negative  Genitourinary: negative  Musculoskeletal: negative  Neurologic:  negative  Psychiatric: negative  Hematologic/Lymphatic/Immunologic: negative  Endocrine: negative      O:   NAD, WDWN, Alert & Oriented, Mood & Affect wnl, Vitals stable   Here today alone   /75  Pulse 78  SpO2 97%   General appearance normal   Vitals stable   Alert, oriented and in no acute distress      Following lymph nodes palpated: Occipital, Cervical, Supraclavicular no lad   Stuck on papules and brown macules on trunk and ext    Red papules on trunk and ext    Flesh colored papules on trunk         The remainder of the full exam was unremarkable; the following areas were examined:  conjunctiva/lids, oral mucosa, neck, peripheral vascular system, abdomen, lymph nodes, digits/nails, eccrine and apocrine glands, scalp/hair, face, neck, chest, abdomen, buttocks, back, RUE, LUE, RLE, LLE       Eyes: Conjunctivae/lids:Normal     ENT: Lips, buccal mucosa, tongue: normal    MSK:Normal    Cardiovascular: peripheral edema none    Pulm: Breathing Normal    Lymph Nodes: No Head and Neck Lymphadenopathy     Neuro/Psych: Orientation:Normal; Mood/Affect:Normal      A/P:  1. Seborrheic keratosis, lentigo, angioma, dermal nevus  BENIGN LESIONS DISCUSSED WITH PATIENT:  I discussed the specifics of tumor, prognosis, and genetics of benign lesions.  I explained that treatment of these lesions would be purely cosmetic and not medically neccessary.  I discussed with patient different removal options including excision, cautery and /or laser.      Nature and genetics of benign skin lesions dicussed with patient.  Signs and Symptoms of skin cancer discussed with patient.  Patient encouraged to perform monthly skin exams.  UV precautions reviewed with patient.  Skin care regimen reviewed with patient: Eliminate harsh soaps, i.e. Dial, zest, irsih spring; Mild soaps such as Cetaphil or Dove sensitive skin, avoid hot or cold showers, aggressive use of emollients including vanicream, cetaphil or cerave discussed with patient.     Risks of non-melanoma skin cancer discussed with patient   Return to clinic 12 months      Again, thank you for allowing me to participate in the care of your patient.        Sincerely,        Elkin Hooks MD

## 2017-12-20 NOTE — MR AVS SNAPSHOT
After Visit Summary   12/20/2017    Arabella Rodríguez    MRN: [id number]           Patient Information     Date Of Birth          1950        Visit Information        Provider Department      12/20/2017 10:15 AM Elkin Hooks MD [facility]        Today's Diagnoses     SK (seborrheic keratosis)    -  1    Lentigo        Angioma        Dermal and epidermal nevus           Follow-ups after your visit        Future tests that were ordered for you today     Open Future Orders        Priority Expected Expires Ordered    US Abdomen Complete Routine  12/20/2018 12/20/2017            Who to contact     If you have questions or need follow up information about today's clinic visit or your schedule please contact [facility] directly at [phone].  Normal or non-critical lab and imaging results will be communicated to you by MyChart, letter or phone within 4 business days after the clinic has received the results. If you do not hear from us within 7 days, please contact the clinic through [employer] or phone. If you have a critical or abnormal lab result, we will notify you by phone as soon as possible.  Submit refill requests through [employer] or call your pharmacy and they will forward the refill request to us. Please allow 3 business days for your refill to be completed.          Additional Information About Your Visit        MyChart Information     [employer] gives you secure access to your electronic health record. If you see a primary care provider, you can also send messages to your care team and make appointments. If you have questions, please call your primary care clinic.  If you do not have a primary care provider, please call [phone] and they will assist you.        Care EveryWhere ID     This is your Care EveryWhere ID. This could be used by other organizations to access your [place] medical records  [id number]        Your Vitals Were     Pulse Pulse  Oximetry                78 97%           Blood Pressure from Last 3 Encounters:   12/20/17 152/75   12/20/17 122/74   04/17/17 144/72    Weight from Last 3 Encounters:   12/20/17 53.6 kg (118 lb 3.2 oz)   03/27/17 57.2 kg (126 lb)   09/14/16 55.8 kg (123 lb)              Today, you had the following     No orders found for display       Primary Care Provider Office Phone # Fax #    Germain Kolb [phone] [address] [address]        Equal Access to Services     DEEJAY [facility] : Hadii aad ku hadasho Somanuelitoali, waaxda luqadaha, qaybta kaalmada adesusanneyada, maria esther deras . So [facility] [phone].    ATENCIÓN: Si habla español, tiene a veras disposición servicios gratuitos de asistencia lingüística. Llame al [phone].    We comply with applicable federal civil rights laws and [place] laws. We do not discriminate on the basis of race, color, national origin, age, disability, sex, sexual orientation, or gender identity.            Thank you!     Thank you for choosing [facility]  for your care. Our goal is always to provide you with excellent care. Hearing back from our patients is one way we can continue to improve our services. Please take a few minutes to complete the written survey that you may receive in the mail after your visit with us. Thank you!             Your Updated Medication List - Protect others around you: Learn how to safely use, store and throw away your medicines at www.disposemymeds.org.          This list is accurate as of: 12/20/17 10:59 AM.  Always use your most recent med list.                   Brand Name Dispense Instructions for use Diagnosis    CALCIUM + D PO      1 TABLET ORALLY DAILY        CVS FISH OIL 1200 MG Cpdr      Take 1 capsule by mouth daily        fluocinonide 0.05 % cream    LIDEX    60 g    Apply sparingly to affected area twice daily as needed.  Do not apply to face.    Nummular dermatitis        hydrochlorothiazide 25 MG tablet    HYDRODIURIL    90 tablet    Take 1 tablet (25 mg) by mouth daily    Essential hypertension       losartan 50 MG tablet    COZAAR    90 tablet    Take 1 tablet (50 mg) by mouth daily    Essential hypertension       MULTI-VITAMIN PO      1 TABLET ORALLY DAILY        valACYclovir 1000 mg tablet    VALTREX    30 tablet    If you have a cold sore use 2 tab po bid for 2 days.  If you have the rash on your leg, 0.5 po twice for 3 days. Hold on file until needed    Cold sore

## 2017-12-29 ENCOUNTER — MYC MEDICAL ADVICE (OUTPATIENT)
Dept: FAMILY MEDICINE | Facility: CLINIC | Age: 67
End: 2017-12-29

## 2017-12-29 ENCOUNTER — HOSPITAL ENCOUNTER (OUTPATIENT)
Dept: ULTRASOUND IMAGING | Facility: CLINIC | Age: 67
Discharge: HOME | End: 2017-12-29
Attending: FAMILY MEDICINE | Admitting: FAMILY MEDICINE
Payer: COMMERCIAL

## 2017-12-29 DIAGNOSIS — R10.9 ABDOMINAL PAIN, UNSPECIFIED ABDOMINAL LOCATION: Primary | ICD-10-CM

## 2017-12-29 DIAGNOSIS — R10.11 ABDOMINAL DISCOMFORT IN RIGHT UPPER QUADRANT: ICD-10-CM

## 2017-12-29 PROCEDURE — 76700 US EXAM ABDOM COMPLETE: CPT

## 2018-01-03 NOTE — TELEPHONE ENCOUNTER
The next step would be to get a CT scan of the abdomen to rule out anything else.  I have ordered the CT scan.  She would need to call [phone].  Germain Kolb MD  Family Medicine

## 2018-01-04 ENCOUNTER — MYC MEDICAL ADVICE (OUTPATIENT)
Dept: FAMILY MEDICINE | Facility: CLINIC | Age: 68
End: 2018-01-04

## 2018-01-04 ENCOUNTER — HOSPITAL ENCOUNTER (OUTPATIENT)
Dept: CT IMAGING | Facility: CLINIC | Age: 68
Discharge: HOME | End: 2018-01-04
Attending: FAMILY MEDICINE | Admitting: FAMILY MEDICINE
Payer: COMMERCIAL

## 2018-01-04 DIAGNOSIS — R10.9 ABDOMINAL PAIN, UNSPECIFIED ABDOMINAL LOCATION: ICD-10-CM

## 2018-01-04 PROCEDURE — 25000125 ZZHC RX 250: Performed by: FAMILY MEDICINE

## 2018-01-04 PROCEDURE — 25000128 H RX IP 250 OP 636: Performed by: FAMILY MEDICINE

## 2018-01-04 PROCEDURE — 74160 CT ABDOMEN W/CONTRAST: CPT

## 2018-01-04 RX ORDER — IOPAMIDOL 755 MG/ML
57 INJECTION, SOLUTION INTRAVASCULAR ONCE
Status: COMPLETED | OUTPATIENT
Start: 2018-01-04 | End: 2018-01-04

## 2018-01-04 RX ADMIN — IOPAMIDOL 57 ML: 755 INJECTION, SOLUTION INTRAVENOUS at 15:33

## 2018-01-04 RX ADMIN — SODIUM CHLORIDE 55 ML: 9 INJECTION, SOLUTION INTRAVENOUS at 15:33

## 2018-01-05 ENCOUNTER — TELEPHONE (OUTPATIENT)
Dept: FAMILY MEDICINE | Facility: CLINIC | Age: 68
End: 2018-01-05

## 2018-01-05 DIAGNOSIS — Z12.11 ENCOUNTER FOR SCREENING COLONOSCOPY: Primary | ICD-10-CM

## 2018-01-05 NOTE — TELEPHONE ENCOUNTER
Colonoscopy ordered. Notified patient she will get a call to have this scheduled. Prep packet mailed.  Doris Odom CMA

## 2018-01-29 ENCOUNTER — ANESTHESIA EVENT (OUTPATIENT)
Dept: GASTROENTEROLOGY | Facility: CLINIC | Age: 68
End: 2018-01-29
Payer: COMMERCIAL

## 2018-01-30 NOTE — ANESTHESIA PREPROCEDURE EVALUATION
Anesthesia Evaluation     . Pt has had prior anesthetic. Type: MAC and General    No history of anesthetic complications          ROS/MED HX    ENT/Pulmonary:  - neg pulmonary ROS     Neurologic:  - neg neurologic ROS     Cardiovascular:     (+) Dyslipidemia, hypertension----. : . . . :. .       METS/Exercise Tolerance:  >4 METS   Hematologic:  - neg hematologic  ROS       Musculoskeletal:   (+) , , other musculoskeletal- Osteopenia      GI/Hepatic:  - neg GI/hepatic ROS       Renal/Genitourinary:  - ROS Renal section negative       Endo:     (+) thyroid problem  Thyroid disease - Other Nodules and cyst, .      Psychiatric:  - neg psychiatric ROS       Infectious Disease:  - neg infectious disease ROS       Malignancy:      - no malignancy   Other:    - neg other ROS                 Physical Exam  Normal systems: cardiovascular, pulmonary and dental    Airway   Mallampati: II  TM distance: >3 FB  Neck ROM: full    Dental     Cardiovascular       Pulmonary                     Anesthesia Plan      History & Physical Review      ASA Status:  2 .    NPO Status:  > 4 hours    Plan for MAC Reason for MAC:  Deep or markedly invasive procedure (G8)         Postoperative Care      Consents  Anesthetic plan, risks, benefits and alternatives discussed with:  Patient..                          .

## 2018-01-31 ENCOUNTER — ANESTHESIA (OUTPATIENT)
Dept: GASTROENTEROLOGY | Facility: CLINIC | Age: 68
End: 2018-01-31
Payer: COMMERCIAL

## 2018-01-31 ENCOUNTER — HOSPITAL ENCOUNTER (OUTPATIENT)
Facility: CLINIC | Age: 68
Discharge: HOME | End: 2018-01-31
Attending: SURGERY | Admitting: SURGERY
Payer: COMMERCIAL

## 2018-01-31 ENCOUNTER — SURGERY (OUTPATIENT)
Age: 68
End: 2018-01-31

## 2018-01-31 VITALS
TEMPERATURE: 98.1 F | WEIGHT: 118 LBS | BODY MASS INDEX: 20.91 KG/M2 | RESPIRATION RATE: 16 BRPM | DIASTOLIC BLOOD PRESSURE: 69 MMHG | HEIGHT: 63 IN | SYSTOLIC BLOOD PRESSURE: 100 MMHG | OXYGEN SATURATION: 98 %

## 2018-01-31 LAB — COLONOSCOPY: NORMAL

## 2018-01-31 PROCEDURE — G0121 COLON CA SCRN NOT HI RSK IND: HCPCS | Performed by: SURGERY

## 2018-01-31 PROCEDURE — 37000008 ZZH ANESTHESIA TECHNICAL FEE, 1ST 30 MIN: Performed by: SURGERY

## 2018-01-31 PROCEDURE — 25000128 H RX IP 250 OP 636: Performed by: NURSE ANESTHETIST, CERTIFIED REGISTERED

## 2018-01-31 PROCEDURE — 45378 DIAGNOSTIC COLONOSCOPY: CPT | Performed by: SURGERY

## 2018-01-31 PROCEDURE — 25000125 ZZHC RX 250: Performed by: SURGERY

## 2018-01-31 PROCEDURE — 25000125 ZZHC RX 250: Performed by: NURSE ANESTHETIST, CERTIFIED REGISTERED

## 2018-01-31 RX ORDER — LIDOCAINE 40 MG/G
CREAM TOPICAL
Status: DISCONTINUED | OUTPATIENT
Start: 2018-01-31 | End: 2018-01-31 | Stop reason: HOSPADM

## 2018-01-31 RX ORDER — PROPOFOL 10 MG/ML
INJECTION, EMULSION INTRAVENOUS PRN
Status: DISCONTINUED | OUTPATIENT
Start: 2018-01-31 | End: 2018-01-31

## 2018-01-31 RX ORDER — ONDANSETRON 2 MG/ML
4 INJECTION INTRAMUSCULAR; INTRAVENOUS
Status: DISCONTINUED | OUTPATIENT
Start: 2018-01-31 | End: 2018-01-31 | Stop reason: HOSPADM

## 2018-01-31 RX ORDER — LIDOCAINE HYDROCHLORIDE 10 MG/ML
INJECTION, SOLUTION EPIDURAL; INFILTRATION; INTRACAUDAL; PERINEURAL PRN
Status: DISCONTINUED | OUTPATIENT
Start: 2018-01-31 | End: 2018-01-31

## 2018-01-31 RX ORDER — GLYCOPYRROLATE 0.2 MG/ML
INJECTION, SOLUTION INTRAMUSCULAR; INTRAVENOUS PRN
Status: DISCONTINUED | OUTPATIENT
Start: 2018-01-31 | End: 2018-01-31

## 2018-01-31 RX ORDER — PROPOFOL 10 MG/ML
INJECTION, EMULSION INTRAVENOUS CONTINUOUS PRN
Status: DISCONTINUED | OUTPATIENT
Start: 2018-01-31 | End: 2018-01-31

## 2018-01-31 RX ORDER — SODIUM CHLORIDE, SODIUM LACTATE, POTASSIUM CHLORIDE, CALCIUM CHLORIDE 600; 310; 30; 20 MG/100ML; MG/100ML; MG/100ML; MG/100ML
INJECTION, SOLUTION INTRAVENOUS CONTINUOUS
Status: DISCONTINUED | OUTPATIENT
Start: 2018-01-31 | End: 2018-01-31 | Stop reason: HOSPADM

## 2018-01-31 RX ADMIN — SODIUM CHLORIDE, POTASSIUM CHLORIDE, SODIUM LACTATE AND CALCIUM CHLORIDE: 600; 310; 30; 20 INJECTION, SOLUTION INTRAVENOUS at 09:41

## 2018-01-31 RX ADMIN — GLYCOPYRROLATE 0.2 MG: 0.2 INJECTION, SOLUTION INTRAMUSCULAR; INTRAVENOUS at 10:05

## 2018-01-31 RX ADMIN — PROPOFOL 200 MCG/KG/MIN: 10 INJECTION, EMULSION INTRAVENOUS at 10:06

## 2018-01-31 RX ADMIN — PROPOFOL 100 MG: 10 INJECTION, EMULSION INTRAVENOUS at 10:06

## 2018-01-31 RX ADMIN — LIDOCAINE HYDROCHLORIDE 50 MG: 10 INJECTION, SOLUTION EPIDURAL; INFILTRATION; INTRACAUDAL; PERINEURAL at 10:05

## 2018-01-31 RX ADMIN — LIDOCAINE HYDROCHLORIDE 1 ML: 10 INJECTION, SOLUTION EPIDURAL; INFILTRATION; INTRACAUDAL; PERINEURAL at 09:42

## 2018-01-31 NOTE — ANESTHESIA CARE TRANSFER NOTE
Patient: Arabella Rodríguez    Procedure(s):  colonoscopy - Wound Class: II-Clean Contaminated    Diagnosis: screening  Diagnosis Additional Information: No value filed.    Anesthesia Type:   MAC     Note:    Patient transferred to:Phase II  Handoff Report: Identifed the Patient, Identified the Reponsible Provider, Reviewed the pertinent medical history, Discussed the surgical course, Reviewed Intra-OP anesthesia mangement and issues during anesthesia, Set expectations for post-procedure period and Allowed opportunity for questions and acknowledgement of understanding      Vitals: (Last set prior to Anesthesia Care Transfer)    CRNA VITALS  1/31/2018 0954 - 1/31/2018 1024      1/31/2018             Pulse: 85    Ht Rate: 85    SpO2: 100 %                Electronically Signed By: THOMAS Styles CRNA  January 31, 2018  10:24 AM

## 2018-01-31 NOTE — H&P
[facility]  GI Pre-Procedure History & Physical      Name: Arabella Rodríguez MRN#: [id number]   Age: 67 year old YOB: 1950     Date of Procedure: 2018    Primary care provider: Germain Kolb      Reason for Procedure:   Screening (V76.51)    Problem List:     Patient Active Problem List   Diagnosis     Xeroderma     Herpes simplex type 1 infection     Osteopenia     Thyroid cyst     Advanced directives, counseling/discussion     Hypertension     Hyperlipidemia LDL goal <130     Multiple thyroid nodules     Skin lesion     CARDIOVASCULAR SCREENING; LDL GOAL LESS THAN 130       Current Outpatient Medications:      No current outpatient prescriptions on file.        Allergies:      Allergies   Allergen Reactions     Formaldehyde Hives        History:   Medical:  Past Medical History:   Diagnosis Date     Hypertension      Osteopenia      Thyroid nodules [facility]     benign      Surgical:  Past Surgical History:   Procedure Laterality Date     ABDOMEN SURGERY  1986         BIOPSY  2014    Thyroid     C ANESTH, SECTION       COLONOSCOPY       EXCISE MASS WRIST Left 2016    Procedure: EXCISE MASS WRIST;  Surgeon: Germain Hull MD;  Location: [place] OR     TONSILLECTOMY       Family:  Family History   Problem Relation Age of Onset     Lipids Mother      Arthritis Mother      Hypertension Mother      Hyperlipidemia Mother      Osteopenia Mother      Hypertension Father      Hyperlipidemia Father      Prostate Cancer Father      Hypertension Paternal Grandfather      Neurologic Disorder Brother      brain tumor     Other Cancer Brother       Brain Surgery/ Brain Surgery     Depression Sister      early      Depression Niece      DIABETES No family hx of      Social:  Social History   Substance Use Topics     Smoking status: Former Smoker     Packs/day: 0.50     Years: 9.00     Types: Cigarettes     Start date: 10/1/1972     Quit date:  "5/1/1981     Smokeless tobacco: Never Used      Comment: I quit in 1981     Alcohol use Yes      Comment: 1 o 2 then switch  to na       Physical Exam:   Vital Signs:  /67  Temp 98.1  F (36.7  C) (Oral)  Resp 16  Ht 1.588 m (5' 2.5\")  Wt 53.5 kg (118 lb)  SpO2 100%  BMI 21.24 kg/m2  Airway assessment:  Patient is able to open mouth wide  Patient is able to stick out tongue       Lungs:  No increased work of breathing, good air exchange, clear to auscultation bilaterally, no crackles or wheezing.   Cardiovascular:  Normal- regular rate and rhythm, normal S1 - S2, no S3 or S4, and no murmur noted.  Gastrointestinal:  Abdomen soft, non-tender.  BS normal. No masses, organomegaly.    Assessment:   Appropriately NPO.  No significant changes since H&P.    Plan:   Moderate (conscious) sedation     General and specific risks of the procedure of the procedure including pain, bleeding, and perforation were discussed. Possibility of missing lesions discussed. Prep and recovery were discussed. She asked appropriate questions and provided consent.      Patient's active problems diagnostically and therapeutically optimized for the planned procedure. Risks, benefits, alternatives to sedation and blood explained and consent obtained.  Risks, benefits, alternatives to procedure explained and consent obtained.    I have reviewed the history and physical, lab finding(s), diagnostic data, medications, and the plan for sedation.  I have determined this patient to be an appropriate candidate for the planned sedation/procedure and have reassessed the patient immediately prior to sedation/procedure.    Xu Feliciano MD      "

## 2018-01-31 NOTE — ANESTHESIA POSTPROCEDURE EVALUATION
Patient: Arabella Rodríguez    Procedure(s):  colonoscopy - Wound Class: II-Clean Contaminated    Diagnosis:screening  Diagnosis Additional Information: No value filed.    Anesthesia Type:  MAC    Note:  Anesthesia Post Evaluation    Patient location during evaluation: Bedside  Patient participation: Able to fully participate in evaluation  Level of consciousness: awake and alert  Pain management: adequate  Airway patency: patent  Cardiovascular status: acceptable  Respiratory status: acceptable  Hydration status: acceptable  PONV: none     Anesthetic complications: None          Last vitals:  Vitals:    01/31/18 0907   BP: 120/67   Resp: 16   Temp: 36.7  C (98.1  F)   SpO2: 100%         Electronically Signed By: THOMAS Styles CRNA  January 31, 2018  10:25 AM

## 2018-02-08 ENCOUNTER — TELEPHONE (OUTPATIENT)
Dept: FAMILY MEDICINE | Facility: CLINIC | Age: 68
End: 2018-02-08

## 2018-02-08 NOTE — TELEPHONE ENCOUNTER
Dr. Kolb,    Patient calling for colonoscopy results.  Specialty clinic RN's tell us that normal colonoscopy's will be resulted by PCP to the patient's.  Josefina PARRA RN

## 2018-02-08 NOTE — TELEPHONE ENCOUNTER
Colonoscopy is normal.  Repeat in 10 years.  The surgeon should have informed her since they did the procedure.  Sincerely,  Germain Kolb MD

## 2018-02-08 NOTE — TELEPHONE ENCOUNTER
Patient is called and told of the normal colonoscopy and the need to repeat in 10 yrs.  Patient is asking about papers that were on her table while recovering from the colonoscopy. She never got them.??  Patient clarifies she wanted the actual report.  This is not normally given to the patient with dc. Josefina PARRA RN

## 2018-02-08 NOTE — TELEPHONE ENCOUNTER
Reason for Call:  Request for results:    Name of test or procedure: colonoscopy    Date of test of procedure: 01/31/18    Location of the test or procedure: [place]    OK to leave the result message on voice mail or with a family member? YES    Phone number Patient can be reached at:  Home number on file [phone] (home)    Additional comments: pt wondering if she could get the result from her colonoscopy.    Call taken on 2/8/2018 at 8:53 AM by Gladys Morgan

## 2018-03-21 DIAGNOSIS — I10 ESSENTIAL HYPERTENSION: ICD-10-CM

## 2018-03-21 NOTE — TELEPHONE ENCOUNTER
"Requested Prescriptions   Pending Prescriptions Disp Refills     hydrochlorothiazide (HYDRODIURIL) 25 MG tablet [Pharmacy Med Name: HYDROCHLOROTHIAZIDE 25MG TABS] 90 tablet 3     Sig: TAKE ONE TABLET BY MOUTH EVERY DAY    Diuretics (Including Combos) Protocol Failed    3/21/2018  1:34 PM       Failed - Normal serum sodium on file in past 12 months    Recent Labs   Lab Test  03/27/17   1122   NA  132*             Passed - Blood pressure under 140/90 in past 12 months    BP Readings from Last 3 Encounters:   01/31/18 100/69   12/20/17 152/75   12/20/17 122/74                Passed - Recent (12 mo) or future (30 days) visit within the authorizing provider's specialty    Patient had office visit in the last 12 months or has a visit in the next 30 days with authorizing provider or within the authorizing provider's specialty.  See \"Patient Info\" tab in inbasket, or \"Choose Columns\" in Meds & Orders section of the refill encounter.           Passed - Patient is age 18 or older       Passed - No active pregancy on record       Passed - Normal serum creatinine on file in past 12 months    Recent Labs   Lab Test  03/27/17   1122   CR  0.77             Passed - Normal serum potassium on file in past 12 months    Recent Labs   Lab Test  03/27/17   1122   POTASSIUM  4.2                   Passed - No positive pregnancy test in past 12 months        losartan (COZAAR) 50 MG tablet [Pharmacy Med Name: LOSARTAN POTASSIUM 50MG TABS] 90 tablet 3     Sig: TAKE ONE TABLET BY MOUTH EVERY DAY    Angiotensin-II Receptors Passed    3/21/2018  1:34 PM       Passed - Blood pressure under 140/90 in past 12 months    BP Readings from Last 3 Encounters:   01/31/18 100/69   12/20/17 152/75   12/20/17 122/74                Passed - Recent (12 mo) or future (30 days) visit within the authorizing provider's specialty    Patient had office visit in the last 12 months or has a visit in the next 30 days with authorizing provider or within the " "authorizing provider's specialty.  See \"Patient Info\" tab in inbasket, or \"Choose Columns\" in Meds & Orders section of the refill encounter.           Passed - Patient is age 18 or older       Passed - No active pregnancy on record       Passed - Normal serum creatinine on file in past 12 months    Recent Labs   Lab Test  03/27/17   1122   CR  0.77            Passed - Normal serum potassium on file in past 12 months    Recent Labs   Lab Test  03/27/17   1122   POTASSIUM  4.2                   Passed - No positive pregnancy test in past 12 months            Both medications:  Last Written Prescription Date:  03/27/2017  Last Fill Quantity: 90,  # refills: 3   Last office visit: 12/20/2017 with prescribing provider:  Cortes   Future Office Visit:   Next 5 appointments (look out 90 days)     Mar 27, 2018  8:40 AM CDT   MyChart Physical Adult with Germain Kolb MD   [facility]    [address] [place] [address]   [phone]                       "

## 2018-03-23 RX ORDER — LOSARTAN POTASSIUM 50 MG/1
TABLET ORAL
Qty: 30 TABLET | Refills: 0 | Status: SHIPPED | OUTPATIENT
Start: 2018-03-23 | End: 2018-03-27

## 2018-03-23 RX ORDER — HYDROCHLOROTHIAZIDE 25 MG/1
TABLET ORAL
Qty: 30 TABLET | Refills: 0 | Status: SHIPPED | OUTPATIENT
Start: 2018-03-23 | End: 2018-03-27

## 2018-03-23 NOTE — TELEPHONE ENCOUNTER
Medication is being filled for 1 time refill only due to:  Patient needs to be seen because it has been more than one year since last visit.    Due for labs and office visit.    Pt has a provider appt next week.  Evelin Jenkins RN

## 2018-03-26 ASSESSMENT — ACTIVITIES OF DAILY LIVING (ADL)
I_NEED_ASSISTANCE_FOR_THE_FOLLOWING_DAILY_ACTIVITIES:: NO ASSISTANCE IS NEEDED
CURRENT_FUNCTION: NO ASSISTANCE NEEDED

## 2018-03-27 ENCOUNTER — HOSPITAL ENCOUNTER (OUTPATIENT)
Dept: MAMMOGRAPHY | Facility: CLINIC | Age: 68
Discharge: HOME | End: 2018-03-27
Attending: FAMILY MEDICINE | Admitting: FAMILY MEDICINE
Payer: COMMERCIAL

## 2018-03-27 ENCOUNTER — OFFICE VISIT (OUTPATIENT)
Dept: FAMILY MEDICINE | Facility: CLINIC | Age: 68
End: 2018-03-27
Payer: COMMERCIAL

## 2018-03-27 VITALS
HEART RATE: 72 BPM | HEIGHT: 62 IN | WEIGHT: 117 LBS | RESPIRATION RATE: 14 BRPM | SYSTOLIC BLOOD PRESSURE: 137 MMHG | BODY MASS INDEX: 21.53 KG/M2 | TEMPERATURE: 98.3 F | DIASTOLIC BLOOD PRESSURE: 77 MMHG

## 2018-03-27 DIAGNOSIS — B00.1 COLD SORE: ICD-10-CM

## 2018-03-27 DIAGNOSIS — E04.1 THYROID CYST: ICD-10-CM

## 2018-03-27 DIAGNOSIS — I10 ESSENTIAL HYPERTENSION: ICD-10-CM

## 2018-03-27 DIAGNOSIS — Z11.59 NEED FOR HEPATITIS C SCREENING TEST: ICD-10-CM

## 2018-03-27 DIAGNOSIS — Z23 NEED FOR VACCINATION: ICD-10-CM

## 2018-03-27 DIAGNOSIS — Z12.31 ENCOUNTER FOR SCREENING MAMMOGRAM FOR BREAST CANCER: ICD-10-CM

## 2018-03-27 DIAGNOSIS — E78.5 HYPERLIPIDEMIA LDL GOAL <130: ICD-10-CM

## 2018-03-27 DIAGNOSIS — Z00.00 ENCOUNTER FOR ROUTINE ADULT HEALTH EXAMINATION WITHOUT ABNORMAL FINDINGS: Primary | ICD-10-CM

## 2018-03-27 LAB
ANION GAP SERPL CALCULATED.3IONS-SCNC: 4 MMOL/L (ref 3–14)
BUN SERPL-MCNC: 13 MG/DL (ref 7–30)
CALCIUM SERPL-MCNC: 9.8 MG/DL (ref 8.5–10.1)
CHLORIDE SERPL-SCNC: 99 MMOL/L (ref 94–109)
CHOLEST SERPL-MCNC: 246 MG/DL
CO2 SERPL-SCNC: 30 MMOL/L (ref 20–32)
CREAT SERPL-MCNC: 0.71 MG/DL (ref 0.52–1.04)
GFR SERPL CREATININE-BSD FRML MDRD: 82 ML/MIN/1.7M2
GLUCOSE SERPL-MCNC: 97 MG/DL (ref 70–99)
HCV AB SERPL QL IA: NONREACTIVE
HDLC SERPL-MCNC: 55 MG/DL
LDLC SERPL CALC-MCNC: 157 MG/DL
NONHDLC SERPL-MCNC: 191 MG/DL
POTASSIUM SERPL-SCNC: 4 MMOL/L (ref 3.4–5.3)
SODIUM SERPL-SCNC: 133 MMOL/L (ref 133–144)
T4 FREE SERPL-MCNC: 0.95 NG/DL (ref 0.76–1.46)
TRIGL SERPL-MCNC: 171 MG/DL
TSH SERPL DL<=0.005 MIU/L-ACNC: 1.62 MU/L (ref 0.4–4)

## 2018-03-27 PROCEDURE — 84443 ASSAY THYROID STIM HORMONE: CPT | Performed by: FAMILY MEDICINE

## 2018-03-27 PROCEDURE — 80048 BASIC METABOLIC PNL TOTAL CA: CPT | Performed by: FAMILY MEDICINE

## 2018-03-27 PROCEDURE — 36415 COLL VENOUS BLD VENIPUNCTURE: CPT | Performed by: FAMILY MEDICINE

## 2018-03-27 PROCEDURE — G0402 INITIAL PREVENTIVE EXAM: HCPCS | Mod: 25 | Performed by: FAMILY MEDICINE

## 2018-03-27 PROCEDURE — 77067 SCR MAMMO BI INCL CAD: CPT

## 2018-03-27 PROCEDURE — 86803 HEPATITIS C AB TEST: CPT | Performed by: FAMILY MEDICINE

## 2018-03-27 PROCEDURE — G0009 ADMIN PNEUMOCOCCAL VACCINE: HCPCS | Performed by: FAMILY MEDICINE

## 2018-03-27 PROCEDURE — 80061 LIPID PANEL: CPT | Performed by: FAMILY MEDICINE

## 2018-03-27 PROCEDURE — 84439 ASSAY OF FREE THYROXINE: CPT | Performed by: FAMILY MEDICINE

## 2018-03-27 PROCEDURE — 90732 PPSV23 VACC 2 YRS+ SUBQ/IM: CPT | Performed by: FAMILY MEDICINE

## 2018-03-27 RX ORDER — HYDROCHLOROTHIAZIDE 25 MG/1
25 TABLET ORAL DAILY
Qty: 90 TABLET | Refills: 3 | Status: SHIPPED | OUTPATIENT
Start: 2018-03-27 | End: 2019-04-10

## 2018-03-27 RX ORDER — LOSARTAN POTASSIUM 50 MG/1
50 TABLET ORAL DAILY
Qty: 90 TABLET | Refills: 3 | Status: SHIPPED | OUTPATIENT
Start: 2018-03-27 | End: 2019-03-28

## 2018-03-27 RX ORDER — VALACYCLOVIR HYDROCHLORIDE 1 G/1
TABLET, FILM COATED ORAL
Qty: 30 TABLET | Refills: 3 | Status: SHIPPED | OUTPATIENT
Start: 2018-03-27 | End: 2019-12-18

## 2018-03-27 ASSESSMENT — ACTIVITIES OF DAILY LIVING (ADL): CURRENT_FUNCTION: NO ASSISTANCE NEEDED

## 2018-03-27 NOTE — PROGRESS NOTES
SUBJECTIVE:   Arabella Rodríguez is a 67 year old female who presents for Preventive Visit.    The 10-year ASCVD risk score ([place] MERVIN Jr, et al., 2013) is: 10%    Values used to calculate the score:      Age: 67 years      Sex: Female      Is Non- : No      Diabetic: No      Tobacco smoker: No      Systolic Blood Pressure: 137 mmHg      Is BP treated: Yes      HDL Cholesterol: 71 mg/dL      Total Cholesterol: 276 mg/dL  Patient is eligible for use of low-dose aspirin for primary prevention of heart attack and stroke.  Provider has discussed aspirin with patient and our decision was:     Prescribe:  Daily low-dose aspirin recommended for primary prevention, patient agrees with plan.      Are you in the first 12 months of your Medicare coverage?  No    Physical   Annual:     Getting at least 3 servings of Calcium per day::  Yes    Bi-annual eye exam::  Yes    Dental care twice a year::  Yes    Sleep apnea or symptoms of sleep apnea::  None    Diet::  Regular (no restrictions)    Frequency of exercise::  2-3 days/week    Duration of exercise::  15-30 minutes    Taking medications regularly::  Yes    Medication side effects::  None    Additional concerns today::  YES    Ability to successfully perform activities of daily living: no assistance needed  Home Safety:  No safety concerns identified  Hearing Impairment: no hearing concerns    She does not have Part B of medicare at this time point.  She has only hospital coverage the clinic is commercial payer only, she has not supplement insurance.  She clarified this with me.      Fall risk:  Fallen 2 or more times in the past year?: No  Any fall with injury in the past year?: No    COGNITIVE SCREEN  1) Repeat 3 items (Banana, Sunrise, Chair)    2) Clock draw: NORMAL  3) 3 item recall: Recalls 3 objects  Results: 3 items recalled: COGNITIVE IMPAIRMENT LESS LIKELY    Mini-CogTM Copyright S [employer]. Licensed by the author for use in [facility]  Services; reprinted with permission (somary jane@[facility]). All rights reserved.        Reviewed and updated as needed this visit by clinical staff  Tobacco  Allergies  Med Hx  Surg Hx  Fam Hx  Soc Hx        Reviewed and updated as needed this visit by Provider        Social History   Substance Use Topics     Smoking status: Former Smoker     Packs/day: 0.50     Years: 9.00     Types: Cigarettes     Start date: 10/1/1972     Quit date: 5/1/1981     Smokeless tobacco: Never Used      Comment: I quit in 1981     Alcohol use Yes      Comment: 1 o 2 then switch  to na       Alcohol Use 3/26/2018   If you drink alcohol do you typically have greater than 3 drinks per day OR greater than 7 drinks per week? No   No flowsheet data found.            Today's PHQ-2 Score:   PHQ-2 ( 1999 [employer]) 3/26/2018   Q1: Little interest or pleasure in doing things 0   Q2: Feeling down, depressed or hopeless 0   PHQ-2 Score 0   Q1: Little interest or pleasure in doing things Not at all   Q2: Feeling down, depressed or hopeless Not at all   PHQ-2 Score 0       Do you feel safe in your environment - Yes    Do you have a Health Care Directive?: No: Advance care planning reviewed with patient; information given to patient to review.    Current providers sharing in care for this patient include:   Patient Care Team:  Germain Kolb MD as PCP - General (Family Practice)  Sherice Estrada MD as MD (INTERNAL MEDICINE - ENDOCRINOLOGY, DIABETES & METABOLISM)    The following health maintenance items are reviewed in Epic and correct as of today:  Health Maintenance   Topic Date Due     HEPATITIS C SCREENING  06/19/1968     LOW DOSE ASA FOR PRIMARY PREVENTION  03/27/2018     FALL RISK ASSESSMENT  03/27/2018     PNEUMOCOCCAL (2 of 2 - PPSV23) 03/27/2018     MAMMO SCREEN Q2 YR (SYSTEM ASSIGNED)  04/04/2018     INFLUENZA VACCINE (SYSTEM ASSIGNED)  09/01/2018     LIPID SCREEN Q5 YR FEMALE (SYSTEM ASSIGNED)  03/27/2022     ADVANCE DIRECTIVE PLANNING Q5  "YRS  03/27/2023     TETANUS IMMUNIZATION (SYSTEM ASSIGNED)  03/31/2025     COLONOSCOPY Q10 YR  01/31/2028     DEXA SCAN SCREENING (SYSTEM ASSIGNED)  Completed             Review of Systems  Review Of Systems  Skin: negative  Eyes: negative  Ears/Nose/Throat: still having some post nasal drip.    Respiratory: No shortness of breath, dyspnea on exertion, cough, or hemoptysis  Cardiovascular: negative  Gastrointestinal: feeling of still has some pressure in the lower chest right side, intermittent.  Work up was negative.    Genitourinary: negative  Musculoskeletal: negative  Neurologic: negative  Psychiatric: negative  Hematologic/Lymphatic/Immunologic: negative  Endocrine: negative      OBJECTIVE:   /77  Pulse 72  Temp 98.3  F (36.8  C) (Tympanic)  Resp 14  Ht 5' 2.13\" (1.578 m)  Wt 117 lb (53.1 kg)  BMI 21.31 kg/m2 Estimated body mass index is 21.31 kg/(m^2) as calculated from the following:    Height as of this encounter: 5' 2.13\" (1.578 m).    Weight as of this encounter: 117 lb (53.1 kg).  Physical Exam  GENERAL: healthy, alert and no distress  EYES: Eyes grossly normal to inspection, PERRL and conjunctivae and sclerae normal  HENT: ear canals and TM's normal, nose and mouth without ulcers or lesions  NECK: no adenopathy, no asymmetry, masses, or scars and thyroid normal to palpation  RESP: lungs clear to auscultation - no rales, rhonchi or wheezes  BREAST: NE, had mammo today  CV: regular rate and rhythm, normal S1 S2, no S3 or S4, no murmur, click or rub, no peripheral edema and peripheral pulses strong  ABDOMEN: soft, nontender, no hepatosplenomegaly, no masses and bowel sounds normal   (female): NE, reviewed why Pap smear is not needed.  Reviewed the recommendation greater than 65 and last 4 pap have been normal.   MS: no gross musculoskeletal defects noted, no edema  SKIN: no suspicious lesions or rashes  NEURO: Normal strength and tone, mentation intact and speech normal  PSYCH: mentation " "appears normal, affect normal/bright  LYMPH: anterior cervical: no adenopathy  posterior cervical: no adenopathy    ASSESSMENT / PLAN:   (Z00.00) Encounter for routine adult health examination without abnormal findings  (primary encounter diagnosis)  Comment: Discussed healthy lifestyle and preventative cares.    Plan:     (I10) Essential hypertension  Comment: refilled and check for side effects  Plan: hydrochlorothiazide (HYDRODIURIL) 25 MG tablet,        losartan (COZAAR) 50 MG tablet, Basic metabolic        panel, aspirin 81 MG tablet            (B00.1) Cold sore  Comment: refilled  Plan: valACYclovir (VALTREX) 1000 mg tablet            (E04.1) Thyroid cyst  Comment: endocrinologist wanted to have thyroid levels checked once a year  Plan: TSH, T4 FREE            (Z23) Need for vaccination  Comment:   Plan: Pneumococcal vaccine 23 valent PPSV23          (Pneumovax) [75670], ADMIN: Vaccine, Initial         (52088)            (E78.5) Hyperlipidemia LDL goal <130  Comment:   Plan: Lipid panel reflex to direct LDL Fasting            (Z11.59) Need for hepatitis C screening test  Comment:   Plan: Hepatitis C Screen Reflex to HCV RNA Quant and         Genotype              End of Life Planning:  Patient currently has an advanced directive: No.  I have verified the patient's ablity to prepare an advanced directive/make health care decisions.  Literature was provided to assist patient in preparing an advanced directive.    COUNSELING:  Reviewed preventive health counseling, as reflected in patient instructions       Regular exercise       Healthy diet/nutrition       Vision screening       Hearing screening       Dental care       Hepatitis C screening        Estimated body mass index is 21.31 kg/(m^2) as calculated from the following:    Height as of this encounter: 5' 2.13\" (1.578 m).    Weight as of this encounter: 117 lb (53.1 kg).     reports that she quit smoking about 36 years ago. Her smoking use included " Cigarettes. She started smoking about 45 years ago. She has a 4.50 pack-year smoking history. She has never used smokeless tobacco.      Appropriate preventive services were discussed with this patient, including applicable screening as appropriate for cardiovascular disease, diabetes, osteopenia/osteoporosis, and glaucoma.  As appropriate for age/gender, discussed screening for colorectal cancer, prostate cancer, breast cancer, and cervical cancer. Checklist reviewing preventive services available has been given to the patient.    Reviewed patients plan of care and provided an AVS. The Basic Care Plan (routine screening as documented in Health Maintenance) for Arabella meets the Care Plan requirement. This Care Plan has been established and reviewed with the Patient.    Counseling Resources:  ATP IV Guidelines  Pooled Cohorts Equation Calculator  Breast Cancer Risk Calculator  FRAX Risk Assessment  ICSI Preventive Guidelines  Dietary Guidelines for Americans, 2010  USDA's MyPlate  ASA Prophylaxis  Lung CA Screening    Germain Kolb MD  [facility] for HPI/ROS submitted by the patient on 3/26/2018   PHQ-2 Score: 0

## 2018-03-27 NOTE — PATIENT INSTRUCTIONS
Please go to lab.    For your post nasal drip try the flonase or similar nasal steroid medication.  Two sprays each nostril once a day.     I refill your medications for the year.        Thank you for choosing [facility].  You may be receiving a survey in the mail from Vitor Boyer regarding your visit today.  Please take a few minutes to complete and return the survey to let us know how we are doing.      If you have questions or concerns, please contact us via [employer] or you can contact your care team at [phone].    Our Clinic hours are:  Monday 6:40 am  to 7:00 pm  Tuesday -Friday 6:40 am to 5:00 pm    The [place] outpatient lab hours are:  Monday - Friday 6:10 am to 4:45 pm  Saturdays 7:00 am to 11:00 am  Appointments are required, call [phone]    If you have clinical questions after hours or would like to schedule an appointment,  call the clinic at [phone].      Preventive Health Recommendations    Female Ages 65 +    Yearly exam:     See your health care provider every year in order to  o Review health changes.   o Discuss preventive care.    o Review your medicines if your doctor has prescribed any.      You no longer need a yearly Pap test unless you've had an abnormal Pap test in the past 10 years. If you have vaginal symptoms, such as bleeding or discharge, be sure to talk with your provider about a Pap test.      Every 1 to 2 years, have a mammogram.  If you are over 69, talk with your health care provider about whether or not you want to continue having screening mammograms.      Every 10 years, have a colonoscopy. Or, have a yearly FIT test (stool test). These exams will check for colon cancer.       Have a cholesterol test every 5 years, or more often if your doctor advises it.       Have a diabetes test (fasting glucose) every three years. If you are at risk for diabetes, you should have this test more often.       At age 65, have a bone density scan (DEXA) to check for  osteoporosis (brittle bone disease).    Shots:    Get a flu shot each year.    Get a tetanus shot every 10 years.    Talk to your doctor about your pneumonia vaccines. There are now two you should receive - Pneumovax (PPSV 23) and Prevnar (PCV 13).    Talk to your doctor about the shingles vaccine.    Talk to your doctor about the hepatitis B vaccine.    Nutrition:     Eat at least 5 servings of fruits and vegetables each day.      Eat whole-grain bread, whole-wheat pasta and brown rice instead of white grains and rice.      Talk to your provider about Calcium and Vitamin D.     Lifestyle    Exercise at least 150 minutes a week (30 minutes a day, 5 days a week). This will help you control your weight and prevent disease.      Limit alcohol to one drink per day.      No smoking.       Wear sunscreen to prevent skin cancer.       See your dentist twice a year for an exam and cleaning.      See your eye doctor every 1 to 2 years to screen for conditions such as glaucoma, macular degeneration and cataracts.

## 2018-03-27 NOTE — NURSING NOTE
"Chief Complaint   Patient presents with     Wellness Visit     Patient completed mammogram this morning. Patient's chart states she is due for Pap (last pap normal; 3/31/15).  Patient is FASTING this morning.     Health Maintenance     Pneumonia 23 recommended/given to patient today.       Initial /77  Pulse 72  Temp 98.3  F (36.8  C) (Tympanic)  Resp 14  Ht 5' 2.13\" (1.578 m)  Wt 117 lb (53.1 kg)  BMI 21.31 kg/m2 Estimated body mass index is 21.31 kg/(m^2) as calculated from the following:    Height as of this encounter: 5' 2.13\" (1.578 m).    Weight as of this encounter: 117 lb (53.1 kg).    Medication Reconciliation:  complete    Tanya Pineda CMA ([facility])    Screening Questionnaire for Adult Immunization    Are you sick today?   No   Do you have allergies to medications, food, a vaccine component or latex?   No   Have you ever had a serious reaction after receiving a vaccination?   No   Do you have a long-term health problem with heart disease, lung disease, asthma, kidney disease, metabolic disease (e.g. diabetes), anemia, or other blood disorder?   No   Do you have cancer, leukemia, HIV/AIDS, or any other immune system problem?   No   In the past 3 months, have you taken medications that affect  your immune system, such as prednisone, other steroids, or anticancer drugs; drugs for the treatment of rheumatoid arthritis, Crohn s disease, or psoriasis; or have you had radiation treatments?   No   Have you had a seizure, or a brain or other nervous system problem?   No   During the past year, have you received a transfusion of blood or blood     products, or been given immune (gamma) globulin or antiviral drug?   No   For women: Are you pregnant or is there a chance you could become        pregnant during the next month?   No   Have you received any vaccinations in the past 4 weeks?   No     Immunization questionnaire answers were all negative.        Per orders of Dr. Kolb, injection of Pneumovax 23 " given by Tanya Pineda. Patient instructed to remain in clinic for 15 minutes afterwards, and to report any adverse reaction to me immediately.       Screening performed by Tanya Pineda on 3/27/2018 at 8:55 AM.

## 2018-03-27 NOTE — MR AVS SNAPSHOT
After Visit Summary   3/27/2018    Arabella Rodríguez    MRN: [id number]           Patient Information     Date Of Birth          1950        Visit Information        Provider Department      3/27/2018 8:40 AM Germain Kolb MD [facility]        Today's Diagnoses     Encounter for routine adult health examination without abnormal findings    -  1    Screening for cervical cancer        Essential hypertension        Cold sore        Thyroid cyst        Need for vaccination        Hyperlipidemia LDL goal <130          Care Instructions    Please go to lab.    For your post nasal drip try the flonase or similar nasal steroid medication.  Two sprays each nostril once a day.     I refill your medications for the year.        Thank you for choosing [facility].  You may be receiving a survey in the mail from [employer] regarding your visit today.  Please take a few minutes to complete and return the survey to let us know how we are doing.      If you have questions or concerns, please contact us via [employer] or you can contact your care team at [phone].    Our Clinic hours are:  Monday 6:40 am  to 7:00 pm  Tuesday -Friday 6:40 am to 5:00 pm    The [place] outpatient lab hours are:  Monday - Friday 6:10 am to 4:45 pm  Saturdays 7:00 am to 11:00 am  Appointments are required, call [phone]    If you have clinical questions after hours or would like to schedule an appointment,  call the clinic at [phone].      Preventive Health Recommendations    Female Ages 65 +    Yearly exam:     See your health care provider every year in order to  o Review health changes.   o Discuss preventive care.    o Review your medicines if your doctor has prescribed any.      You no longer need a yearly Pap test unless you've had an abnormal Pap test in the past 10 years. If you have vaginal symptoms, such as bleeding or discharge, be sure to talk with your provider about a Pap  test.      Every 1 to 2 years, have a mammogram.  If you are over 69, talk with your health care provider about whether or not you want to continue having screening mammograms.      Every 10 years, have a colonoscopy. Or, have a yearly FIT test (stool test). These exams will check for colon cancer.       Have a cholesterol test every 5 years, or more often if your doctor advises it.       Have a diabetes test (fasting glucose) every three years. If you are at risk for diabetes, you should have this test more often.       At age 65, have a bone density scan (DEXA) to check for osteoporosis (brittle bone disease).    Shots:    Get a flu shot each year.    Get a tetanus shot every 10 years.    Talk to your doctor about your pneumonia vaccines. There are now two you should receive - Pneumovax (PPSV 23) and Prevnar (PCV 13).    Talk to your doctor about the shingles vaccine.    Talk to your doctor about the hepatitis B vaccine.    Nutrition:     Eat at least 5 servings of fruits and vegetables each day.      Eat whole-grain bread, whole-wheat pasta and brown rice instead of white grains and rice.      Talk to your provider about Calcium and Vitamin D.     Lifestyle    Exercise at least 150 minutes a week (30 minutes a day, 5 days a week). This will help you control your weight and prevent disease.      Limit alcohol to one drink per day.      No smoking.       Wear sunscreen to prevent skin cancer.       See your dentist twice a year for an exam and cleaning.      See your eye doctor every 1 to 2 years to screen for conditions such as glaucoma, macular degeneration and cataracts.          Follow-ups after your visit        Follow-up notes from your care team     Return in about 1 year (around 3/27/2019).      Future tests that were ordered for you today     Open Future Orders        Priority Expected Expires Ordered    MA Screen Bilateral w/Landon Routine  3/27/2018 3/27/2017            Who to contact     If you have  "questions or need follow up information about today's clinic visit or your schedule please contact [facility] directly at [phone].  Normal or non-critical lab and imaging results will be communicated to you by MyChart, letter or phone within 4 business days after the clinic has received the results. If you do not hear from us within 7 days, please contact the clinic through [employer] or phone. If you have a critical or abnormal lab result, we will notify you by phone as soon as possible.  Submit refill requests through [employer] or call your pharmacy and they will forward the refill request to us. Please allow 3 business days for your refill to be completed.          Additional Information About Your Visit        [employer] Information     [employer] gives you secure access to your electronic health record. If you see a primary care provider, you can also send messages to your care team and make appointments. If you have questions, please call your primary care clinic.  If you do not have a primary care provider, please call [phone] and they will assist you.        Care EveryWhere ID     This is your Care EveryWhere ID. This could be used by other organizations to access your [place] medical records  [id number]        Your Vitals Were     Pulse Temperature Respirations Height BMI (Body Mass Index)       72 98.3  F (36.8  C) (Tympanic) 14 5' 2.13\" (1.578 m) 21.31 kg/m2        Blood Pressure from Last 3 Encounters:   03/27/18 137/77   01/31/18 100/69   12/20/17 152/75    Weight from Last 3 Encounters:   03/27/18 117 lb (53.1 kg)   01/31/18 118 lb (53.5 kg)   12/20/17 118 lb 3.2 oz (53.6 kg)              We Performed the Following     ADMIN: Vaccine, Initial (83085)     Basic metabolic panel     Lipid panel reflex to direct LDL Fasting     Pneumococcal vaccine 23 valent PPSV23  (Pneumovax) [09655]     T4 FREE     TSH          Today's Medication Changes          These changes are accurate as of 3/27/18  " 9:21 AM.  If you have any questions, ask your nurse or doctor.               These medicines have changed or have updated prescriptions.        Dose/Directions    hydrochlorothiazide 25 MG tablet   Commonly known as:  HYDRODIURIL   This may have changed:  See the new instructions.   Used for:  Essential hypertension   Changed by:  Germain Kolb MD        Dose:  25 mg   Take 1 tablet (25 mg) by mouth daily Hold on file until needed   Quantity:  90 tablet   Refills:  3       losartan 50 MG tablet   Commonly known as:  COZAAR   This may have changed:  See the new instructions.   Used for:  Essential hypertension   Changed by:  Germain Kolb MD        Dose:  50 mg   Take 1 tablet (50 mg) by mouth daily Hold on file until needed   Quantity:  90 tablet   Refills:  3            Where to get your medicines      These medications were sent [place] [facility] [place] [address]  [address] [address]     Phone:  [phone]     hydrochlorothiazide 25 MG tablet    losartan 50 MG tablet    valACYclovir 1000 mg tablet                Primary Care Provider Office Phone # Fax #    Germain Kolb [phone]       [facility] [facility] [address]        Equal Access to Services     [facility]: Hadii joanna ku hadasho Soomaali, waaxda luqadaha, qaybta kaalmada adeegyada, maria esther puga haysam deras . So [facility] [phone].    ATENCIÓN: Si habla español, tiene a veras disposición servicios gratuitos de asistencia lingüística. Llame al [phone].    We comply with applicable federal civil rights laws and [place] laws. We do not discriminate on the basis of race, color, national origin, age, disability, sex, sexual orientation, or gender identity.            Thank you!     Thank you for choosing [facility]  for your care. Our goal is always to provide you with excellent care. Hearing back from our patients is one way we can  continue to improve our services. Please take a few minutes to complete the written survey that you may receive in the mail after your visit with us. Thank you!             Your Updated Medication List - Protect others around you: Learn how to safely use, store and throw away your medicines at www.disposemymeds.org.          This list is accurate as of 3/27/18  9:21 AM.  Always use your most recent med list.                   Brand Name Dispense Instructions for use Diagnosis    aspirin 81 MG tablet      Take by mouth daily    Essential hypertension       CALCIUM + D PO      1 TABLET ORALLY DAILY        [employer] FISH OIL 1200 MG Cpdr      Take 1 capsule by mouth daily        fluocinonide 0.05 % cream    LIDEX    60 g    Apply sparingly to affected area twice daily as needed.  Do not apply to face.    Nummular dermatitis       hydrochlorothiazide 25 MG tablet    HYDRODIURIL    90 tablet    Take 1 tablet (25 mg) by mouth daily Hold on file until needed    Essential hypertension       losartan 50 MG tablet    COZAAR    90 tablet    Take 1 tablet (50 mg) by mouth daily Hold on file until needed    Essential hypertension       MULTI-VITAMIN PO      1 TABLET ORALLY DAILY        valACYclovir 1000 mg tablet    VALTREX    30 tablet    If you have a cold sore use 2 tab po bid for 2 days.  If you have the rash on your leg, 0.5 po twice for 3 days. Hold on file until needed    Cold sore

## 2018-09-13 ENCOUNTER — TELEPHONE (OUTPATIENT)
Dept: ENDOCRINOLOGY | Facility: CLINIC | Age: 68
End: 2018-09-13

## 2018-09-13 DIAGNOSIS — E04.2 MULTIPLE THYROID NODULES: Primary | ICD-10-CM

## 2018-09-13 NOTE — TELEPHONE ENCOUNTER
Health Call Center    Phone Message    May a detailed message be left on voicemail: yes    Reason for Call: Other: Arabella called and scheduled appt with Dr. Estrada. She states she will need a thyroid US before her appt. Please issue order if agreed and call to schedule. Thank you!     Action Taken: Message routed to:  [facility] ([facility] Endocrinology

## 2018-09-19 ENCOUNTER — HOSPITAL ENCOUNTER (OUTPATIENT)
Dept: ULTRASOUND IMAGING | Facility: CLINIC | Age: 68
Discharge: HOME | End: 2018-09-19
Payer: COMMERCIAL

## 2018-09-19 ENCOUNTER — OFFICE VISIT (OUTPATIENT)
Dept: DERMATOLOGY | Facility: CLINIC | Age: 68
End: 2018-09-19
Payer: COMMERCIAL

## 2018-09-19 VITALS — HEART RATE: 71 BPM | DIASTOLIC BLOOD PRESSURE: 79 MMHG | OXYGEN SATURATION: 99 % | SYSTOLIC BLOOD PRESSURE: 156 MMHG

## 2018-09-19 DIAGNOSIS — E04.2 MULTIPLE THYROID NODULES: ICD-10-CM

## 2018-09-19 DIAGNOSIS — D18.00 ANGIOMA: ICD-10-CM

## 2018-09-19 DIAGNOSIS — L81.4 LENTIGO: ICD-10-CM

## 2018-09-19 DIAGNOSIS — L82.1 SK (SEBORRHEIC KERATOSIS): Primary | ICD-10-CM

## 2018-09-19 PROCEDURE — 76536 US EXAM OF HEAD AND NECK: CPT

## 2018-09-19 PROCEDURE — 99213 OFFICE O/P EST LOW 20 MIN: CPT | Performed by: DERMATOLOGY

## 2018-09-19 NOTE — MR AVS SNAPSHOT
After Visit Summary   9/19/2018    Arabella Rodríguez    MRN: [id number]           Patient Information     Date Of Birth          1950        Visit Information        Provider Department      9/19/2018 1:15 PM Elkin Hooks MD [facility]        Today's Diagnoses     SK (seborrheic keratosis)    -  1    Lentigo        Angioma           Follow-ups after your visit        Your next 10 appointments already scheduled     Nov 21, 2018  2:10 PM CST   (Arrive by 1:55 PM)   RETURN ENDOCRINE with Sherice Estrada MD   [facility] ([facility])    [address] [place] [id number] [phone]              Who to contact     If you have questions or need follow up information about today's clinic visit or your schedule please contact [facility] directly at [phone].  Normal or non-critical lab and imaging results will be communicated to you by MyChart, letter or phone within 4 business days after the clinic has received the results. If you do not hear from us within 7 days, please contact the clinic through MyChart or phone. If you have a critical or abnormal lab result, we will notify you by phone as soon as possible.  Submit refill requests through [employer] or call your pharmacy and they will forward the refill request to us. Please allow 3 business days for your refill to be completed.          Additional Information About Your Visit        MyChart Information     [employer] gives you secure access to your electronic health record. If you see a primary care provider, you can also send messages to your care team and make appointments. If you have questions, please call your primary care clinic.  If you do not have a primary care provider, please call [phone] and they will assist you.        Care EveryWhere ID     This is your Care EveryWhere ID. This could be used by other organizations to access your  [place] medical records  [id number]        Your Vitals Were     Pulse Pulse Oximetry                71 99%           Blood Pressure from Last 3 Encounters:   09/19/18 156/79   03/27/18 137/77   01/31/18 100/69    Weight from Last 3 Encounters:   03/27/18 53.1 kg (117 lb)   01/31/18 53.5 kg (118 lb)   12/20/17 53.6 kg (118 lb 3.2 oz)              Today, you had the following     No orders found for display       Primary Care Provider Office Phone # Fax #    Germain Kolb [phone] [address] [address]        Equal Access to Services     [employer] : Rajiv olguino Sojohan, waaxda celia, qaybta kaalmada adedot, maria esther deras . So [facility] [phone].    ATENCIÓN: Si habla español, tiene a veras disposición servicios gratuitos de asistencia lingüística. Llame al [phone].    We comply with applicable federal civil rights laws and [place] laws. We do not discriminate on the basis of race, color, national origin, age, disability, sex, sexual orientation, or gender identity.            Thank you!     Thank you for choosing [facility]  for your care. Our goal is always to provide you with excellent care. Hearing back from our patients is one way we can continue to improve our services. Please take a few minutes to complete the written survey that you may receive in the mail after your visit with us. Thank you!             Your Updated Medication List - Protect others around you: Learn how to safely use, store and throw away your medicines at www.disposemymeds.org.          This list is accurate as of 9/19/18  1:28 PM.  Always use your most recent med list.                   Brand Name Dispense Instructions for use Diagnosis    aspirin 81 MG tablet      Take by mouth daily    Essential hypertension       CALCIUM + D PO      1 TABLET ORALLY DAILY        CVS FISH OIL 1200 MG Cpdr      Take 1 capsule by mouth daily         fluocinonide 0.05 % cream    LIDEX    60 g    Apply sparingly to affected area twice daily as needed.  Do not apply to face.    Nummular dermatitis       hydrochlorothiazide 25 MG tablet    HYDRODIURIL    90 tablet    Take 1 tablet (25 mg) by mouth daily Hold on file until needed    Essential hypertension       losartan 50 MG tablet    COZAAR    90 tablet    Take 1 tablet (50 mg) by mouth daily Hold on file until needed    Essential hypertension       MULTI-VITAMIN PO      1 TABLET ORALLY DAILY        valACYclovir 1000 mg tablet    VALTREX    30 tablet    If you have a cold sore use 2 tab po bid for 2 days.  If you have the rash on your leg, 0.5 po twice for 3 days. Hold on file until needed    Cold sore

## 2018-09-19 NOTE — LETTER
2018         RE: Arabella Rodríguez  [address] [address]        Dear Colleague,    Thank you for referring your patient, Arabella Rodríguez, to the [facility]. Please see a copy of my visit note below.    Arabella Rodríguez is a 68 year old year old female patient here today for brown spot on back.  .  Patient states this has been present for a while.  Patient reports the following symptoms:  none .  Patient reports the following previous treatments none.  Patient reports the following modifying factors none.  Associated symptoms: none.  Patient has no other skin complaints today.  Remainder of the HPI, Meds, PMH, Allergies, FH, and SH was reviewed in chart.      Past Medical History:   Diagnosis Date     Hypertension      Osteopenia      Thyroid nodules [facility]     benign        Past Surgical History:   Procedure Laterality Date     ABDOMEN SURGERY  1986         BIOPSY  2014    Thyroid     C ANESTH, SECTION       COLONOSCOPY       COLONOSCOPY N/A 2018    Procedure: COLONOSCOPY;  colonoscopy;  Surgeon: Xu Feliciano MD;  Location: [facility]     EXCISE MASS WRIST Left 2016    Procedure: EXCISE MASS WRIST;  Surgeon: Germain Hull MD;  Location: [facility]     TONSILLECTOMY          Family History   Problem Relation Age of Onset     Lipids Mother      Arthritis Mother      Hypertension Mother      Hyperlipidemia Mother      Osteopenia Mother      Hypertension Father      Hyperlipidemia Father      Prostate Cancer Father      Hypertension Paternal Grandfather      Neurologic Disorder Brother      brain tumor     Other Cancer Brother       Brain Surgery/ Brain Surgery     Depression Sister      early      Depression Niece      Diabetes No family hx of        Social History     Social History     Marital status:      Spouse name: N/A     Number of children: N/A     Years of education: N/A     Occupational  History      [employer]     Social History Main Topics     Smoking status: Former Smoker     Packs/day: 0.50     Years: 9.00     Types: Cigarettes     Start date: 10/1/1972     Quit date: 5/1/1981     Smokeless tobacco: Never Used      Comment: I quit in 1981     Alcohol use Yes      Comment: 1 o 2 then switch  to na     Drug use: No     Sexual activity: Not Currently     Partners: Male     Other Topics Concern     Parent/Sibling W/ Cabg, Mi Or Angioplasty Before 65f 55m? No      Service No     Blood Transfusions No     Caffeine Concern Yes     3 cups cofffee a day     Occupational Exposure No     Hobby Hazards No     Sleep Concern No     Stress Concern No     Weight Concern No     Special Diet Yes     calcium with D one a day     Back Care No     Exercise Yes     couple times a wk     Bike Helmet No     Seat Belt Yes     Self-Exams No     Social History Narrative       Outpatient Encounter Prescriptions as of 9/19/2018   Medication Sig Dispense Refill     aspirin 81 MG tablet Take by mouth daily       CALCIUM + D OR 1 TABLET ORALLY DAILY       hydrochlorothiazide (HYDRODIURIL) 25 MG tablet Take 1 tablet (25 mg) by mouth daily Hold on file until needed 90 tablet 3     losartan (COZAAR) 50 MG tablet Take 1 tablet (50 mg) by mouth daily Hold on file until needed 90 tablet 3     MULTI-VITAMIN OR 1 TABLET ORALLY DAILY       Omega-3 Fatty Acids (CVS FISH OIL) 1200 MG CPDR Take 1 capsule by mouth daily        valACYclovir (VALTREX) 1000 mg tablet If you have a cold sore use 2 tab po bid for 2 days.  If you have the rash on your leg, 0.5 po twice for 3 days. Hold on file until needed 30 tablet 3     fluocinonide (LIDEX) 0.05 % cream Apply sparingly to affected area twice daily as needed.  Do not apply to face. (Patient not taking: Reported on 12/20/2017) 60 g 1     No facility-administered encounter medications on file as of 9/19/2018.              Review Of Systems  Skin: As above  Eyes:  negative  Ears/Nose/Throat: negative  Respiratory: No shortness of breath, dyspnea on exertion, cough, or hemoptysis  Cardiovascular: negative  Gastrointestinal: negative  Genitourinary: negative  Musculoskeletal: negative  Neurologic: negative  Psychiatric: negative  Hematologic/Lymphatic/Immunologic: negative  Endocrine: negative      O:   NAD, WDWN, Alert & Oriented, Mood & Affect wnl, Vitals stable   Here today alone   /79  Pulse 71  SpO2 99%   General appearance normal   Vitals stable   Alert, oriented and in no acute distress      Following lymph nodes palpated: Occipital, Cervical, Supraclavicular no lad   Stuck on papules and brown macules on trunk and ext    Red papules on trunk         The remainder of the full exam was unremarkable; the following areas were examined:  conjunctiva/lids, oral mucosa, neck, peripheral vascular system, abdomen, lymph nodes, digits/nails, eccrine and apocrine glands, scalp/hair, face, neck, chest, abdomen, buttocks, back, RUE, LUE, RLE, LLE       Eyes: Conjunctivae/lids:Normal     ENT: Lips, buccal mucosa, tongue: normal    MSK:Normal    Cardiovascular: peripheral edema none    Pulm: Breathing Normal    Lymph Nodes: No Head and Neck Lymphadenopathy     Neuro/Psych: Orientation:Normal; Mood/Affect:Normal      A/P:  1. Seborrheic keratosis, eltnigo, angioma  BENIGN LESIONS DISCUSSED WITH PATIENT:  I discussed the specifics of tumor, prognosis, and genetics of benign lesions.  I explained that treatment of these lesions would be purely cosmetic and not medically neccessary.  I discussed with patient different removal options including excision, cautery and /or laser.      Nature and genetics of benign skin lesions dicussed with patient.  Signs and Symptoms of skin cancer discussed with patient.  ABCDEs of melanoma reviewed with patient.  Patient encouraged to perform monthly skin exams.  UV precautions reviewed with patient.  Patient to follow up with Primary Care  provider regarding elevated blood pressure.  Skin care regimen reviewed with patient: Eliminate harsh soaps, i.e. Dial, zest, irsih spring; Mild soaps such as Cetaphil or Dove sensitive skin, avoid hot or cold showers, aggressive use of emollients including vanicream, cetaphil or cerave discussed with patient.    Risks of non-melanoma skin cancer discussed with patient   Return to clinic 1 2months        Again, thank you for allowing me to participate in the care of your patient.        Sincerely,        Elkin Hooks MD

## 2018-09-19 NOTE — NURSING NOTE
Chief Complaint   Patient presents with     Skin Check       Vitals:    09/19/18 1304   BP: 156/79   Pulse: 71   SpO2: 99%     Wt Readings from Last 1 Encounters:   03/27/18 53.1 kg (117 lb)     Roxy Rosen LPN.................9/19/2018

## 2018-09-19 NOTE — PROGRESS NOTES
Arabella Rodríguez is a 68 year old year old female patient here today for brown spot on back.  .  Patient states this has been present for a while.  Patient reports the following symptoms:  none .  Patient reports the following previous treatments none.  Patient reports the following modifying factors none.  Associated symptoms: none.  Patient has no other skin complaints today.  Remainder of the HPI, Meds, PMH, Allergies, FH, and SH was reviewed in chart.      Past Medical History:   Diagnosis Date     Hypertension      Osteopenia      Thyroid nodules [facility]     benign        Past Surgical History:   Procedure Laterality Date     ABDOMEN SURGERY  1986         BIOPSY  2014    Thyroid     C ANESTH, SECTION       COLONOSCOPY       COLONOSCOPY N/A 2018    Procedure: COLONOSCOPY;  colonoscopy;  Surgeon: Xu Feliciano MD;  Location: [facility]     EXCISE MASS WRIST Left 2016    Procedure: EXCISE MASS WRIST;  Surgeon: Germain Hull MD;  Location: [facility]     TONSILLECTOMY          Family History   Problem Relation Age of Onset     Lipids Mother      Arthritis Mother      Hypertension Mother      Hyperlipidemia Mother      Osteopenia Mother      Hypertension Father      Hyperlipidemia Father      Prostate Cancer Father      Hypertension Paternal Grandfather      Neurologic Disorder Brother      brain tumor     Other Cancer Brother       Brain Surgery/ Brain Surgery     Depression Sister      early      Depression Niece      Diabetes No family hx of        Social History     Social History     Marital status:      Spouse name: N/A     Number of children: N/A     Years of education: N/A     Occupational History      [employer]     Social History Main Topics     Smoking status: Former Smoker     Packs/day: 0.50     Years: 9.00     Types: Cigarettes     Start date: 10/1/1972     Quit date: 1981     Smokeless tobacco: Never Used       Comment: I quit in 1981     Alcohol use Yes      Comment: 1 o 2 then switch  to na     Drug use: No     Sexual activity: Not Currently     Partners: Male     Other Topics Concern     Parent/Sibling W/ Cabg, Mi Or Angioplasty Before 65f 55m? No      Service No     Blood Transfusions No     Caffeine Concern Yes     3 cups cofffee a day     Occupational Exposure No     Hobby Hazards No     Sleep Concern No     Stress Concern No     Weight Concern No     Special Diet Yes     calcium with D one a day     Back Care No     Exercise Yes     couple times a wk     Bike Helmet No     Seat Belt Yes     Self-Exams No     Social History Narrative       Outpatient Encounter Prescriptions as of 9/19/2018   Medication Sig Dispense Refill     aspirin 81 MG tablet Take by mouth daily       CALCIUM + D OR 1 TABLET ORALLY DAILY       hydrochlorothiazide (HYDRODIURIL) 25 MG tablet Take 1 tablet (25 mg) by mouth daily Hold on file until needed 90 tablet 3     losartan (COZAAR) 50 MG tablet Take 1 tablet (50 mg) by mouth daily Hold on file until needed 90 tablet 3     MULTI-VITAMIN OR 1 TABLET ORALLY DAILY       Omega-3 Fatty Acids ([employer]S FISH OIL) 1200 MG CPDR Take 1 capsule by mouth daily        valACYclovir (VALTREX) 1000 mg tablet If you have a cold sore use 2 tab po bid for 2 days.  If you have the rash on your leg, 0.5 po twice for 3 days. Hold on file until needed 30 tablet 3     fluocinonide (LIDEX) 0.05 % cream Apply sparingly to affected area twice daily as needed.  Do not apply to face. (Patient not taking: Reported on 12/20/2017) 60 g 1     No facility-administered encounter medications on file as of 9/19/2018.              Review Of Systems  Skin: As above  Eyes: negative  Ears/Nose/Throat: negative  Respiratory: No shortness of breath, dyspnea on exertion, cough, or hemoptysis  Cardiovascular: negative  Gastrointestinal: negative  Genitourinary: negative  Musculoskeletal: negative  Neurologic: negative  Psychiatric:  negative  Hematologic/Lymphatic/Immunologic: negative  Endocrine: negative      O:   NAD, WDWN, Alert & Oriented, Mood & Affect wnl, Vitals stable   Here today alone   /79  Pulse 71  SpO2 99%   General appearance normal   Vitals stable   Alert, oriented and in no acute distress      Following lymph nodes palpated: Occipital, Cervical, Supraclavicular no lad   Stuck on papules and brown macules on trunk and ext    Red papules on trunk         The remainder of the full exam was unremarkable; the following areas were examined:  conjunctiva/lids, oral mucosa, neck, peripheral vascular system, abdomen, lymph nodes, digits/nails, eccrine and apocrine glands, scalp/hair, face, neck, chest, abdomen, buttocks, back, RUE, LUE, RLE, LLE       Eyes: Conjunctivae/lids:Normal     ENT: Lips, buccal mucosa, tongue: normal    MSK:Normal    Cardiovascular: peripheral edema none    Pulm: Breathing Normal    Lymph Nodes: No Head and Neck Lymphadenopathy     Neuro/Psych: Orientation:Normal; Mood/Affect:Normal      A/P:  1. Seborrheic keratosis, eltnigo, angioma  BENIGN LESIONS DISCUSSED WITH PATIENT:  I discussed the specifics of tumor, prognosis, and genetics of benign lesions.  I explained that treatment of these lesions would be purely cosmetic and not medically neccessary.  I discussed with patient different removal options including excision, cautery and /or laser.      Nature and genetics of benign skin lesions dicussed with patient.  Signs and Symptoms of skin cancer discussed with patient.  ABCDEs of melanoma reviewed with patient.  Patient encouraged to perform monthly skin exams.  UV precautions reviewed with patient.  Patient to follow up with Primary Care provider regarding elevated blood pressure.  Skin care regimen reviewed with patient: Eliminate harsh soaps, i.e. Dial, zest, irsih spring; Mild soaps such as Cetaphil or Dove sensitive skin, avoid hot or cold showers, aggressive use of emollients including  vanicream, cetaphil or cerave discussed with patient.    Risks of non-melanoma skin cancer discussed with patient   Return to clinic 1 2months

## 2018-10-19 ENCOUNTER — OFFICE VISIT (OUTPATIENT)
Dept: FAMILY MEDICINE | Facility: CLINIC | Age: 68
End: 2018-10-19
Payer: COMMERCIAL

## 2018-10-19 VITALS
DIASTOLIC BLOOD PRESSURE: 76 MMHG | WEIGHT: 117.2 LBS | HEART RATE: 72 BPM | SYSTOLIC BLOOD PRESSURE: 138 MMHG | TEMPERATURE: 97.1 F | HEIGHT: 62 IN | OXYGEN SATURATION: 99 % | BODY MASS INDEX: 21.57 KG/M2

## 2018-10-19 DIAGNOSIS — N93.9 VAGINAL SPOTTING: Primary | ICD-10-CM

## 2018-10-19 PROCEDURE — 99214 OFFICE O/P EST MOD 30 MIN: CPT | Performed by: NURSE PRACTITIONER

## 2018-10-19 NOTE — PATIENT INSTRUCTIONS
Cervix is normal on exam  Monitor symptoms  If this reoccur, or develop pelvic pain with more bleeding please schedule pelvic US and follow up with OB GYN

## 2018-10-19 NOTE — PROGRESS NOTES
"  SUBJECTIVE:   Arabella Rodríguez is a 68 year old female who presents to clinic today for the following health issues: complains of bloody discharge that she noted on her underwear last week, states it was bright red color blood, small amount. Denies abdominal pain, rectal pain, dysuria, hematuria and blood in stools.   The patient always had normal pap screening results. No prior history of vaginal bleeding, or spotting.     Problem list and histories reviewed & adjusted, as indicated.  Additional history: as documented    Labs reviewed in EPIC    Reviewed and updated as needed this visit by clinical staff  Tobacco  Allergies  Meds  Med Hx  Surg Hx  Fam Hx  Soc Hx      Reviewed and updated as needed this visit by Provider         ROS:  Constitutional, HEENT, cardiovascular, pulmonary, gi and gu systems are negative, except as otherwise noted.    OBJECTIVE:     /76  Pulse 72  Temp 97.1  F (36.2  C) (Tympanic)  Ht 5' 2.13\" (1.578 m)  Wt 117 lb 3.2 oz (53.2 kg)  SpO2 99%  BMI 21.35 kg/m2  Body mass index is 21.35 kg/(m^2).  GENERAL: healthy, alert and no distress  ABDOMEN: soft, nontender   (female): normal female external genitalia, normal urethral meatus , vaginal mucosa pink, moist, well rugated, vaginal discharge - scant and clear, vaginal mucosal atrophy and normal cervix, adnexae, and uterus without masses.  RECTAL (female): normal sphincter tone, no rectal masses    Diagnostic Test Results:  none     ASSESSMENT/PLAN:     1. Vaginal spotting  -history of normal pap results over the last 10 years  -normal  exam except for vaginal atrophy, no current bleeding, cervix normal.   -recommended to monitor for symptoms, if bleeding reoccur schedule pelvic US and follow up with OB GYN   - US Pelvic Complete w Transvaginal; Future    See Patient Instructions    THOMAS [facility]  "

## 2018-10-19 NOTE — MR AVS SNAPSHOT
After Visit Summary   10/19/2018    Arabella Rodríguez    MRN: [id number]           Patient Information     Date Of Birth          1950        Visit Information        Provider Department      10/19/2018 8:20 AM Gloria Escobedo APRN CNP [facility]        Today's Diagnoses     Vaginal spotting    -  1      Care Instructions    Cervix is normal on exam  Monitor symptoms  Bleeding was possibly due to hemorrhoids  If this reoccur, or develop pelvic pain with more bleeding please schedule pelvic US and follow up with OB GYN                 Follow-ups after your visit        Your next 10 appointments already scheduled     Nov 21, 2018  2:10 PM CST   (Arrive by 1:55 PM)   RETURN ENDOCRINE with Sherice Estrada MD   [facility] End[facility] ([facility] [place])    [address] [place] [id number] [phone]              Future tests that were ordered for you today     Open Future Orders        Priority Expected Expires Ordered    US Pelvic Complete w Transvaginal Routine  10/19/2019 10/19/2018            Who to contact     If you have questions or need follow up information about today's clinic visit or your schedule please contact [facility] directly at [phone].  Normal or non-critical lab and imaging results will be communicated to you by MyChart, letter or phone within 4 business days after the clinic has received the results. If you do not hear from us within 7 days, please contact the clinic through MyChart or phone. If you have a critical or abnormal lab result, we will notify you by phone as soon as possible.  Submit refill requests through [employer] or call your pharmacy and they will forward the refill request to us. Please allow 3 business days for your refill to be completed.          Additional Information About Your Visit        [employer] Information     [employer] gives you secure access to your electronic health  "record. If you see a primary care provider, you can also send messages to your care team and make appointments. If you have questions, please call your primary care clinic.  If you do not have a primary care provider, please call [phone] and they will assist you.        Care EveryWhere ID     This is your Care EveryWhere ID. This could be used by other organizations to access your [place] medical records  [id number]        Your Vitals Were     Pulse Temperature Height Pulse Oximetry BMI (Body Mass Index)       72 97.1  F (36.2  C) (Tympanic) 5' 2.13\" (1.578 m) 99% 21.35 kg/m2        Blood Pressure from Last 3 Encounters:   10/19/18 150/70   09/19/18 156/79   03/27/18 137/77    Weight from Last 3 Encounters:   10/19/18 117 lb 3.2 oz (53.2 kg)   03/27/18 117 lb (53.1 kg)   01/31/18 118 lb (53.5 kg)               Primary Care Provider Office Phone # Fax #    Germain Kolb [phone] [facility] [address]        Equal Access to Services     YUNI THRASHER AH: Hadii aad ku hadasho Soomaali, waaxda luqadaha, qaybta kaalmada adeegyada, maria esther coopern juan m deras ah. So [facility] [phone].    ATENCIÓN: Si habla español, tiene a veras disposición servicios gratuitos de asistencia lingüística. Aristides al [phone].    We comply with applicable federal civil rights laws and [place] laws. We do not discriminate on the basis of race, color, national origin, age, disability, sex, sexual orientation, or gender identity.            Thank you!     Thank you for choosing [facility]  for your care. Our goal is always to provide you with excellent care. Hearing back from our patients is one way we can continue to improve our services. Please take a few minutes to complete the written survey that you may receive in the mail after your visit with us. Thank you!             Your Updated Medication List - Protect others around you: Learn how to safely use, store and " throw away your medicines at www.disposemymeds.org.          This list is accurate as of 10/19/18  8:29 AM.  Always use your most recent med list.                   Brand Name Dispense Instructions for use Diagnosis    aspirin 81 MG tablet      Take by mouth daily    Essential hypertension       CALCIUM + D PO      1 TABLET ORALLY DAILY        CVS FISH OIL 1200 MG Cpdr      Take 1 capsule by mouth daily        fluocinonide 0.05 % cream    LIDEX    60 g    Apply sparingly to affected area twice daily as needed.  Do not apply to face.    Nummular dermatitis       hydrochlorothiazide 25 MG tablet    HYDRODIURIL    90 tablet    Take 1 tablet (25 mg) by mouth daily Hold on file until needed    Essential hypertension       losartan 50 MG tablet    COZAAR    90 tablet    Take 1 tablet (50 mg) by mouth daily Hold on file until needed    Essential hypertension       MULTI-VITAMIN PO      1 TABLET ORALLY DAILY        UNABLE TO FIND      MEDICATION NAME: Allergy relief nasal spray        valACYclovir 1000 mg tablet    VALTREX    30 tablet    If you have a cold sore use 2 tab po bid for 2 days.  If you have the rash on your leg, 0.5 po twice for 3 days. Hold on file until needed    Cold sore

## 2018-11-14 ASSESSMENT — ENCOUNTER SYMPTOMS
SINUS PAIN: 0
NECK MASS: 0
SORE THROAT: 1
HOARSE VOICE: 1
TASTE DISTURBANCE: 0
SINUS CONGESTION: 0
TROUBLE SWALLOWING: 0
SMELL DISTURBANCE: 0

## 2018-11-21 ENCOUNTER — OFFICE VISIT (OUTPATIENT)
Dept: ENDOCRINOLOGY | Facility: CLINIC | Age: 68
End: 2018-11-21
Payer: COMMERCIAL

## 2018-11-21 VITALS
BODY MASS INDEX: 22.1 KG/M2 | HEIGHT: 62 IN | DIASTOLIC BLOOD PRESSURE: 73 MMHG | HEART RATE: 69 BPM | SYSTOLIC BLOOD PRESSURE: 138 MMHG | WEIGHT: 120.1 LBS

## 2018-11-21 DIAGNOSIS — M85.9 LOW BONE DENSITY: ICD-10-CM

## 2018-11-21 DIAGNOSIS — E04.2 MULTIPLE THYROID NODULES: Primary | ICD-10-CM

## 2018-11-21 ASSESSMENT — PAIN SCALES - GENERAL: PAINLEVEL: NO PAIN (0)

## 2018-11-21 NOTE — MR AVS SNAPSHOT
After Visit Summary   11/21/2018    Arabella Rodríguez    MRN: [id number]           Patient Information     Date Of Birth          1950        Visit Information        Provider Department      11/21/2018 2:10 PM Sherice Estrada MD [facility]        Today's Diagnoses     Multiple thyroid nodules    -  1    Low bone density          Care Instructions    I recommend we needle biopsy nodule # 1 as defined the 9/18 US.     CALCIUM Recommendation 1500 mg/day in divided dose of no more than 500 mg/dose. This includes what is in your food and also what is in any supplements you are taking.      Dietary sources of calcium: These also contain vitamin D  Milk / buttermilk              8 oz            300 mg  Dry milk powder       5 Tbsp             300 mg  Yogurt                          1 cup           400 mg  Ice cream   1/2 cup          100 mg  Hard cheese                     1.5 oz          300 mg  Cottage cheese                1/2 cup        65 mg  Spinach, cooked  1 cup  240 mg  Tofu, soft regular           4 oz          120 to 390 mg  Sardines                       3 oz               370 mg  Mackerel canned         3 oz                250 mg  Canned salmon with bones 3 oz        170-210 mg  Calcium fortified cereals are available  SILK soy and almond milk products are calcium fortified  Orange juice  Fortified with Calcium       8 oz            300 mg  Low fat dairy sources are recommended      Recommended exercise goals:    30 minutes of moderate exercise on most days of the week .  Weight bearing exercise (ie, walking, jogging, hiking, dancing)    Strength training 2 or more times/week in addition to other weight -being exercise.  Strength training -- uses weight or resistance beyond that seen in everyday activities -(pilates, weight training with free weights, weight machines or resistance bands)      [facility]     [phone]          Follow-ups after your visit       "  Follow-up notes from your care team     Return if symptoms worsen or fail to improve.      Future tests that were ordered for you today     Open Future Orders        Priority Expected Expires Ordered    Dexa hip/pelvis/spine Routine  6/19/2019 11/21/2018    US guided thyroid FNA Routine  11/21/2019 11/21/2018            Who to contact     Please call your clinic at [phone] to:    Ask questions about your health    Make or cancel appointments    Discuss your medicines    Learn about your test results    Speak to your doctor            Additional Information About Your Visit        [employer] Information     [employer] gives you secure access to your electronic health record. If you see a primary care provider, you can also send messages to your care team and make appointments. If you have questions, please call your primary care clinic.  If you do not have a primary care provider, please call [phone] and they will assist you.      [employer] is an electronic gateway that provides easy, online access to your medical records. With [employer], you can request a clinic appointment, read your test results, renew a prescription or communicate with your care team.     To access your existing account, please contact your [facility] or call [phone] for assistance.        Care EveryWhere ID     This is your Care EveryWhere ID. This could be used by other organizations to access your [place] medical records  [id number]        Your Vitals Were     Pulse Height BMI (Body Mass Index)             69 1.578 m (5' 2.13\") 21.88 kg/m2          Blood Pressure from Last 3 Encounters:   11/21/18 138/73   10/19/18 138/76   09/19/18 156/79    Weight from Last 3 Encounters:   11/21/18 54.5 kg (120 lb 1.6 oz)   10/19/18 53.2 kg (117 lb 3.2 oz)   03/27/18 53.1 kg (117 lb)               Primary Care Provider Office Phone # Fax #    THOMAS Jenkins [phone] [facility] [address]        Equal Access to Services     YUNI THRASHER : Hadii aad ku hadpaocassandra Malihaali, waaxda luqadaha, qaybta shodiegopete keller, maria esther conklindianadee dee fuller. So [facility] [phone].    ATENCIÓN: Si habla español, tiene a veras disposición servicios gratuitos de asistencia lingüística. Llame al [phone].    We comply with applicable federal civil rights laws and [place] laws. We do not discriminate on the basis of race, color, national origin, age, disability, sex, sexual orientation, or gender identity.            Thank you!     Thank you for choosing [facility]  for your care. Our goal is always to provide you with excellent care. Hearing back from our patients is one way we can continue to improve our services. Please take a few minutes to complete the written survey that you may receive in the mail after your visit with us. Thank you!             Your Updated Medication List - Protect others around you: Learn how to safely use, store and throw away your medicines at www.disposemymeds.org.          This list is accurate as of 11/21/18  2:50 PM.  Always use your most recent med list.                   Brand Name Dispense Instructions for use Diagnosis    aspirin 81 MG tablet    ASA     Take by mouth daily    Essential hypertension       CALCIUM + D PO      1 TABLET ORALLY DAILY        CVS FISH OIL 1200 MG Cpdr      Take 1 capsule by mouth daily        fluocinonide 0.05 % cream    LIDEX    60 g    Apply sparingly to affected area twice daily as needed.  Do not apply to face.    Nummular dermatitis       hydrochlorothiazide 25 MG tablet    HYDRODIURIL    90 tablet    Take 1 tablet (25 mg) by mouth daily Hold on file until needed    Essential hypertension       losartan 50 MG tablet    COZAAR    90 tablet    Take 1 tablet (50 mg) by mouth daily Hold on file until needed    Essential hypertension       MULTI-VITAMIN PO      1 TABLET ORALLY DAILY        UNABLE TO  FIND      MEDICATION NAME: Allergy relief nasal spray        valACYclovir 1000 mg tablet    VALTREX    30 tablet    If you have a cold sore use 2 tab po bid for 2 days.  If you have the rash on your leg, 0.5 po twice for 3 days. Hold on file until needed    Cold sore

## 2018-11-21 NOTE — PATIENT INSTRUCTIONS
I recommend we needle biopsy nodule # 1 as defined the 9/18 US.     CALCIUM Recommendation 1500 mg/day in divided dose of no more than 500 mg/dose. This includes what is in your food and also what is in any supplements you are taking.      Dietary sources of calcium: These also contain vitamin D  Milk / buttermilk              8 oz            300 mg  Dry milk powder       5 Tbsp             300 mg  Yogurt                          1 cup           400 mg  Ice cream   1/2 cup          100 mg  Hard cheese                     1.5 oz          300 mg  Cottage cheese                1/2 cup        65 mg  Spinach, cooked  1 cup  240 mg  Tofu, soft regular           4 oz          120 to 390 mg  Sardines                       3 oz               370 mg  Mackerel canned         3 oz                250 mg  Canned salmon with bones 3 oz        170-210 mg  Calcium fortified cereals are available  SILK soy and almond milk products are calcium fortified  Orange juice  Fortified with Calcium       8 oz            300 mg  Low fat dairy sources are recommended      Recommended exercise goals:    30 minutes of moderate exercise on most days of the week .  Weight bearing exercise (ie, walking, jogging, hiking, dancing)    Strength training 2 or more times/week in addition to other weight -being exercise.  Strength training -- uses weight or resistance beyond that seen in everyday activities -(pilates, weight training with free weights, weight machines or resistance bands)      [facility]     [phone]

## 2018-11-21 NOTE — LETTER
11/21/2018       RE: Arabella Rodríguez  [address] [place]  [facility] [id number]     Dear Colleague,    Thank you for referring your patient, Arabella Rodríguez, to the [facility] ENDOCRINOLOGY at [facility]. Please see a copy of my visit note below.    Endocrinology Attending Physician Progress note    Assessment / Plan  1.  Nodular goiter - multiple thyroid nodules  A) Right #1 is probably what was biopsied in 2010 and ? In 2014.  It has changed over time and currently looks like avascular shadowing mostly solid nodule.  Recommend another FNAB given the shadowing  B) Right # 3 is cystic  I showed Arabella the images today.      2.  Low BMD.  Discussed.   Repeat DXA at [facility].      Sherice Estrada MD.      Cc/ HPI :  68 year old woman returns for follow up of goiter/ thyroid nodule.   She was last seen by me 9/16. She already had thyroid US in anticipation of this appt.        I have reviewed the images on PACS today  Thyroid US 9/19/18 ([facility])  Right nodule # 1  Inferior 1.3 x 1.1 x 1.5 cm (was 1.5 x 1.4 x 1.6 9/16;   Right # 2  0.7 X 0.7 x 0.9 (was 0.9 x 0.6 x 0.9 cm )  Right isthmus # 3 / right medial 1.3 x 0.6 x 1.9 cm > 90% cystic (was 1.2 x 0.5 x 1.3 cm )was 2.6 x 2.6 x 2.9 7/14  Left isthmu s# 4  0.6 x  0.3 x 0.6  (was 0.8 x 0.5 x 0.6 cm     1.5 x 1.,4 x 1.6 cm, hypoechoic mid lobe --this is residual of the large cyst formerly followed - this has no blood flow on doppler   Right # 2  0.9 x  0.6 x 1 cm posterior medial heterogeneous  (was 0.5 x   Right isthmus anterior border 1.2 x 0.5 x 1.3 mixed cystic solid  (was 1.1 x 0.6 x 1.2 )  Isthmus # 4  0.8 x0.5 x 0.6 cm (was 0.5 x 0.4 x 0.6 cm)      Goiter had been lst noted 6/10 during a routine exam.  8/11/10 calcitonin was < 2.  8/24/12 BRADEN < 20, TPO < 10. She has had normal TFTS, most recently 3/27/18: TSH 1.62, free T4 0.95  2010 US FNAB of a complex/ cystic right thyroid nodule (# 2, as defined on the 6/30/10 US)   benign cytology.    14: Right cyst fluid and mural component: benign       DXA 13: lowest T-score -1.3 at the right femoral neck. She is on calcium with vitamin D , once/day. :She doesn't know her dose. She has dairy intake in her diet. She walks regularly.  There is a family history of low bone density in her mother.  LMP was mid 50s.  She never took HRT>       ROS   Energy OK  Sleep is  OK  Weight stable  Sore throats more often  Drainage/ nasal spray seems to help  Swallow is OK  Voice is OK  Clears throat a lot especially in the AM  Cardiac: negative  Respiratory: negative  GI: constipation sometimes      Family Hx   Family History   Problem Relation Age of Onset     Lipids Mother      Arthritis Mother      Hypertension Mother      Hyperlipidemia Mother      Osteopenia Mother      Hypertension Father      Hyperlipidemia Father      Prostate Cancer Father      Hypertension Paternal Grandfather      Neurologic Disorder Brother      brain tumor     Other Cancer Brother       Brain Surgery/ Brain Surgery     Depression Sister      early      Depression Niece      Diabetes No family hx of      Thyroid Cancer No family hx of      Thyroid Disease No family hx of        brain tumor in brother - presented with seizure  .  Personal Hx   Behavioral history: No tobacco use.  Home environment: No secondhand tobacco smoke in home.  ;  Walks 2 miles 4-5 times/week -     PMH   Past Medical History:   Diagnosis Date     Hypertension      Osteopenia      Thyroid nodules [facility]     benign      Past Surgical History:   Procedure Laterality Date     ABDOMEN SURGERY  1986         BIOPSY  2014    Thyroid     C ANESTH, SECTION       COLONOSCOPY       COLONOSCOPY N/A 2018    Procedure: COLONOSCOPY;  colonoscopy;  Surgeon: Xu Feliciano MD;  Location: [facility]     EXCISE MASS WRIST Left 2016    Procedure: EXCISE MASS WRIST;  Surgeon: Germain Hull  "MD;  Location: [facility]     TONSILLECTOMY         Current Outpatient Prescriptions   Medication Sig Dispense Refill     aspirin 81 MG tablet Take by mouth daily       CALCIUM + D OR 1 TABLET ORALLY DAILY       hydrochlorothiazide (HYDRODIURIL) 25 MG tablet Take 1 tablet (25 mg) by mouth daily Hold on file until needed 90 tablet 3     losartan (COZAAR) 50 MG tablet Take 1 tablet (50 mg) by mouth daily Hold on file until needed 90 tablet 3     MULTI-VITAMIN OR 1 TABLET ORALLY DAILY       Omega-3 Fatty Acids ([employer] FISH OIL) 1200 MG CPDR Take 1 capsule by mouth daily        UNABLE TO FIND MEDICATION NAME: Allergy relief nasal spray       fluocinonide (LIDEX) 0.05 % cream Apply sparingly to affected area twice daily as needed.  Do not apply to face. (Patient not taking: Reported on 12/20/2017) 60 g 1     valACYclovir (VALTREX) 1000 mg tablet If you have a cold sore use 2 tab po bid for 2 days.  If you have the rash on your leg, 0.5 po twice for 3 days. Hold on file until needed (Patient not taking: Reported on 10/19/2018) 30 tablet 3     Physical Exam   GENERAL: petite  middle aged woman in NAD. Her voice is normal  /73  Pulse 69  Ht 1.578 m (5' 2.13\")  Wt 54.5 kg (120 lb 1.6 oz)  BMI 21.88 kg/m2  SKIN: normal  color, normal temperature, texture  HEENT: PER,  no scleral icterus, eyelid retraction, stare, lid lag, proptosis or conjunctival injection.    NECK:   NO visible thyroid.    Right thyroid nodule feels approx 1-1.5 cm.  I have performed real time thyroid and neck US. There are no abnormal cervical lymph nodes.  It is apparent on real time imaging that right nodule #1 (as defined on the 9/18 US) has significant shadowing.    LUNGS: clear to auscultation bilaterally  CARDIAC: RRR, S1, S2 without murmurs, rubs or gallops.    BACK: normal spinal contour   NEURO: Alert, responds appropriately to questions,  moves all extremities,  gait normal, no tremor of the outstretched hand.   EXTREMITIES: No clubbing, " cyanosis or edema.      DATA REVIEW:    US THYROID 9/19/2018 12:44 PM      HISTORY: Follow-up of thyroid nodules.     COMPARISON: 9/8/2016.     FINDINGS: The right lobe of the thyroid gland measures 4.0 x 1.3 x 1.4  cm. The left lobe of the thyroid gland measures 3.7 x 1.0 x 1.2 cm.     There are three focal findings in the right lobe. There is a solid  hypoechoic nodule in the lower pole that measures 1.5 x 1.1 x 1.3 cm.  There is a solid heterogeneous nodule in the mid-upper pole that  measures 0.9 x 0.7 x 0.7 cm. There is a predominately cystic lesion at  the junction of the right lobe and isthmus that measures 1.4 x 0.9 x  1.4 cm.     There is a solid hypoechoic nodule at the junction of the left lobe  and isthmus which measures 0.6 x 0.3 x 0.6 cm.         IMPRESSION: No significant change in multinodular thyroid gland from  9/8/2016.     MD Sherice KIMBROUGH MD

## 2018-11-21 NOTE — PROGRESS NOTES
"Endocrinology Attending Physician Progress note    Assessment / Plan  1.  Nodular goiter - multiple thyroid nodules  A) Right #1 is probably what was biopsied in 2010 and ? In 2014.  It has changed over time and currently looks like avascular shadowing mostly solid nodule.  Recommend another FNAB given the shadowing  B) Right # 3 is cystic  I showed Arabella the images today.      10/18/19 addendum: FNAB not done until 10/16/2019 - review of images confirms proper needle placement.  Cytology non diagnostic/ insufficient  Nodule appears to be 1.2 x 0.9 x     Addendum:   Review of 7/24/2020 thyroid US: compared with 9/9/18   Right # 1 1.3 x 1 x 1.2 cm - shadowing eggshell calc-- this had FNAB 10/16/19  (was 1.5 x 1.1 x   Right posterior 0.7 x 0.6 x 0.7 cm (was 0.9 x 0.7 x   \"left lobe # 4\" (which looks like isthmus midline to my eye) 0.8 x 0.5 x 1.1 cm  (was 0.6  X 0.3 x 0.6   Right 3 # 3 2.4 x 1.1 x 2.4 mixed > 50% cystic (was 1.4 x 0.9 x       2.  Low BMD.  Discussed.   Repeat DXA at [facility].     Addendum: DXA images reviewed and re-read by me, from 12/17/18: lowest T-score -1.6 at the right femoral neck.  Diagnosis : Low BMD  Right total hip 0.831 (was 0.849)  Delta -0.019  Left total hip 0.941 (was 0.960) delta -0.018   L spine 1.142 ( was 1.109 2016) - this is probable  bone gain  In all cases the changes were close to our LSC cut offs.  I don't know that this precision data is known at [facility].   FRAx 8.9/1.2 assuming no past fracture, negative family history of hip fracture -      Sherice Estrada MD.      Cc/ HPI :  68 year old woman returns for follow up of goiter/ thyroid nodule.   She was last seen by me 9/16. She already had thyroid US in anticipation of this appt.        I have reviewed the images on PACS today  Thyroid US 9/19/18 ([facility])  Right nodule # 1  Inferior 1.3 x 1.1 x 1.5 cm (was 1.5 x 1.4 x 1.6 9/16;   Right # 2  0.7 X 0.7 x 0.9 (was 0.9 x 0.6 x 0.9 cm )  Right isthmus # 3 / right medial " 1.3 x 0.6 x 1.9 cm > 90% cystic (was 1.2 x 0.5 x 1.3 cm )was 2.6 x 2.6 x 2.9 7/14  Left isthmu s# 4  0.6 x  0.3 x 0.6  (was 0.8 x 0.5 x 0.6 cm     1.5 x 1.,4 x 1.6 cm, hypoechoic mid lobe --this is residual of the large cyst formerly followed - this has no blood flow on doppler   Right # 2  0.9 x  0.6 x 1 cm posterior medial heterogeneous  (was 0.5 x   Right isthmus anterior border 1.2 x 0.5 x 1.3 mixed cystic solid  (was 1.1 x 0.6 x 1.2 )  Isthmus # 4  0.8 x0.5 x 0.6 cm (was 0.5 x 0.4 x 0.6 cm)      Goiter had been lst noted 6/10 during a routine exam.  8/11/10 calcitonin was < 2.  8/24/12 BRADEN < 20, TPO < 10. She has had normal TFTS, most recently 3/27/18: TSH 1.62, free T4 0.95  2010 US FNAB of a complex/ cystic right thyroid nodule (# 2, as defined on the 6/30/10 US)  benign cytology.    9/23/14: Right cyst fluid and mural component: benign       DXA 7/17/13: lowest T-score -1.3 at the right femoral neck. She is on calcium with vitamin D , once/day. :She doesn't know her dose. She has dairy intake in her diet. She walks regularly.  There is a family history of low bone density in her mother.  LMP was mid 50s.  She never took HRT>       ROS   Energy OK  Sleep is  OK  Weight stable  Sore throats more often  Drainage/ nasal spray seems to help  Swallow is OK  Voice is OK  Clears throat a lot especially in the AM  Cardiac: negative  Respiratory: negative  GI: constipation sometimes      Family Hx   Family History   Problem Relation Age of Onset     Lipids Mother      Arthritis Mother      Hypertension Mother      Hyperlipidemia Mother      Osteopenia Mother      Hypertension Father      Hyperlipidemia Father      Prostate Cancer Father      Hypertension Paternal Grandfather      Neurologic Disorder Brother      brain tumor     Other Cancer Brother      1997 Brain Surgery/1997 Brain Surgery     Depression Sister      early 1980s     Depression Niece      Diabetes No family hx of      Thyroid Cancer No family hx of       Thyroid Disease No family hx of        brain tumor in brother - presented with seizure  .  Personal Hx   Behavioral history: No tobacco use.  Home environment: No secondhand tobacco smoke in home.  ;  Walks 2 miles 4-5 times/week -     PMH   Past Medical History:   Diagnosis Date     Hypertension      Osteopenia      Thyroid nodules [facility]     benign      Past Surgical History:   Procedure Laterality Date     ABDOMEN SURGERY  1986         BIOPSY  2014    Thyroid     C ANESTH, SECTION       COLONOSCOPY       COLONOSCOPY N/A 2018    Procedure: COLONOSCOPY;  colonoscopy;  Surgeon: Xu Feliciano MD;  Location: [facility]     EXCISE MASS WRIST Left 2016    Procedure: EXCISE MASS WRIST;  Surgeon: Germain Hull MD;  Location: [facility] OR     TONSILLECTOMY         Current Outpatient Prescriptions   Medication Sig Dispense Refill     aspirin 81 MG tablet Take by mouth daily       CALCIUM + D OR 1 TABLET ORALLY DAILY       hydrochlorothiazide (HYDRODIURIL) 25 MG tablet Take 1 tablet (25 mg) by mouth daily Hold on file until needed 90 tablet 3     losartan (COZAAR) 50 MG tablet Take 1 tablet (50 mg) by mouth daily Hold on file until needed 90 tablet 3     MULTI-VITAMIN OR 1 TABLET ORALLY DAILY       Omega-3 Fatty Acids ([employer] FISH OIL) 1200 MG CPDR Take 1 capsule by mouth daily        UNABLE TO FIND MEDICATION NAME: Allergy relief nasal spray       fluocinonide (LIDEX) 0.05 % cream Apply sparingly to affected area twice daily as needed.  Do not apply to face. (Patient not taking: Reported on 2017) 60 g 1     valACYclovir (VALTREX) 1000 mg tablet If you have a cold sore use 2 tab po bid for 2 days.  If you have the rash on your leg, 0.5 po twice for 3 days. Hold on file until needed (Patient not taking: Reported on 10/19/2018) 30 tablet 3     Physical Exam   GENERAL: petite  middle aged woman in NAD. Her voice is normal  /73  Pulse 69  Ht 1.578 m (5'  "2.13\")  Wt 54.5 kg (120 lb 1.6 oz)  BMI 21.88 kg/m2  SKIN: normal  color, normal temperature, texture  HEENT: PER,  no scleral icterus, eyelid retraction, stare, lid lag, proptosis or conjunctival injection.    NECK:   NO visible thyroid.    Right thyroid nodule feels approx 1-1.5 cm.  I have performed real time thyroid and neck US. There are no abnormal cervical lymph nodes.  It is apparent on real time imaging that right nodule #1 (as defined on the 9/18 US) has significant shadowing.    LUNGS: clear to auscultation bilaterally  CARDIAC: RRR, S1, S2 without murmurs, rubs or gallops.    BACK: normal spinal contour   NEURO: Alert, responds appropriately to questions,  moves all extremities,  gait normal, no tremor of the outstretched hand.   EXTREMITIES: No clubbing, cyanosis or edema.      DATA REVIEW:    US THYROID 9/19/2018 12:44 PM      HISTORY: Follow-up of thyroid nodules.     COMPARISON: 9/8/2016.     FINDINGS: The right lobe of the thyroid gland measures 4.0 x 1.3 x 1.4  cm. The left lobe of the thyroid gland measures 3.7 x 1.0 x 1.2 cm.     There are three focal findings in the right lobe. There is a solid  hypoechoic nodule in the lower pole that measures 1.5 x 1.1 x 1.3 cm.  There is a solid heterogeneous nodule in the mid-upper pole that  measures 0.9 x 0.7 x 0.7 cm. There is a predominately cystic lesion at  the junction of the right lobe and isthmus that measures 1.4 x 0.9 x  1.4 cm.     There is a solid hypoechoic nodule at the junction of the left lobe  and isthmus which measures 0.6 x 0.3 x 0.6 cm.         IMPRESSION: No significant change in multinodular thyroid gland from  9/8/2016.     RAY MALONE MD    DX HIP/PELVIS/SPINE 12/17/2018 2:36 PM     HISTORY: Postmenopausal.     TECHNIQUE: The lumbar spine and bilateral hips  are scanned with DXA  technique performed using a [employer] scanner.  DXA results are  reported according to T-score.  The T-score is the standard deviation  from the " peak bone mass in a normal young adult patient.  A T-score of  -1.0 to -2.5 correlates with osteopenia.  A T-score of less than -2.5  correlates with osteoporosis.     In accordance with the ISCD (International Society of Clinical  Densitometry) the lowest BMD between the total hip and femoral neck is  reported.      COMPARISON: 10/6/2016.                                                                      IMPRESSION:  1. The T-score of the lumbar spine on today's study in the region of  L1-L4 is -0.4 which correlates with normal bone mineral density. The  patient had a T-score in the normal bone mineral density range here on  the prior study at -0.7. If one looks at the L1 vertebral body alone  the T-score is -1.1 which correlates with mild osteopenia. This  T-score has worsened compared to the prior study where it was -0.9.     2. The T-score of the right femoral neck on today's study is -1.6  which correlates with moderate osteopenia. This T-score has slightly  worsened compared to prior study where it was -1.5.     3.  The T-score of the left femoral neck on today's study is -1.1  which correlates with mild osteopenia. This T-score is unchanged from  the prior study.     4.  The bone mineral density of the worst hip is 0.818 g/cm2.  This  has worsened compared to the prior study where it was 0.829 g/cm2.     RAY MALONE MD     The wrong patient's dictation unfortunately was applied to the  accession number. This is the correct dictation for this patient.     EXAM:   Dated 10/16/2018  1. Ultrasound evaluation of the thyroid  2. Ultrasound guided fine needle aspiration right  #1     HISTORY:   Multiple biopsies of this thyroid nodule in the past have  been negative. Still of concern      FINDINGS:    Ultrasound of the patient's thyroid shows multiple  thyroid nodules. Please see dictation of 9/19/2018. Thyroid glands do  not appears significantly different.     PROCEDURE:    The patient's neck was prepped and  draped in sterile  fashion. Total of 1 cc of 1% lidocaine was used for skin anesthesia.  Using ultrasound guidance fine needle aspiration was performed with a  25-gauge needle.      Nodules that were biopsied based on prior ultrasound dated 9/19/2018     Right thyroid nodule # 1, 6 passes performed     All biopsies were performed with ultrasound guidance and images  archived.     Pathology was present. Despite 6 passes with the tissue obtained no  acinar cells were seen. Will await final pathology.     COMPLICATIONS:    None by immediate ultrasound.     IMPRESSION:    Successful fine needle aspiration of right thyroid  nodule.      JAIRO LUEVANO MD        Copath Report 10/16/2019 10:37 AM 88   Patient Name: ANDRZEJ RICHARDSON   MR#: [id number]   Specimen #: [id number]   Collected: 10/16/2019   Received: 10/16/2019   Reported: 10/16/2019 15:59   Ordering Phy(s): VINNIE KESSLER     For improved result formatting, select 'View Enhanced Report Format' under    Linked Documents section.     SPECIMEN/STAIN PROCESS:   Thyroid, right #1 , ultrasound guided fine needle aspiration        Pap-Cyto x 4, Diff Quick Stain-cyto x 5     ----------------------------------------------------------------     CYTOLOGIC INTERPRETATION:     Thyroid, right #1 , ultrasound guided fine needle aspiration:     Nondiagnostic specimen.   Virtually acellular specimen     The [place] implied risk of malignancy and recommended clinical   management:   Nondiagnostic or Unsatisfactory has a 5-10% risk of malignancy,   recommended management is repeat FNA with   ultrasound guidance in 4-6 weeks.     Specimen Adequacy: Unsatisfactory for evaluation.  Specimen processed and   evaluated, but unsatisfactory for   evaluation of epithelial cell abnormality due to:   ...insufficient numbers of follicular cells for diagnosis.     I have personally reviewed all specimens and/or slides, including the   listed special stains, and used them   with my medical  judgement to determine or confirm the final diagnosis.     Electronically signed out by:     Addy Travis M.D., [facility]     CLINICAL HISTORY:   Right thyroid nodule #1 1.5 x 1.3 x 1.1 cm.     ,     GROSS:   Thyroid, right #1 , ultrasound guided fine needle aspiration:  Received   are 4 fixed slides, processed for Pap   stain, and 5 air dried slides, processed for Diff Quik stain. Afirma tube   held.     INTRAOPERATIVE CONSULTATION:   FNA Performance: Fine needle aspiration was not performed by [facility],    Pathology staff.   Aspirate immediate study/adequacy:   I, Dr. VIDHI Marshall MD, attest that I immediately examined smears while the   procedure was underway and determined or   confirmed the adequacy of the specimens via telepathology.   It is of note that the final assessment and report may be performed and   signed by a different pathologist.     Onsite adequacy/interpretation:   Pass A1 inadequate, Pass A2 put into afirma, Pass A3-A6 inadequate.     MICROSCOPIC:   A microscopic examination was performed.     Panfilo Travis MD     CPT Codes:    09403-YCQR-NLY, 46021-QMIA-UEA   A: [id number]     COLLECTION SITE:   Client:  [facility]   Location:  [facility] (B)     The technical component of this testing was completed at the [facility], with the professional component performed    at the [facility], [address] [place] [id number] ([phone])      Lab and Collection     Fine needle aspiration pathology [facility] - Right (Order:       ULTRASOUND THYROID July 24, 2020 10:30 AM     CLINICAL HISTORY: Thyroid nodule follow-up.     TECHNIQUE: Thyroid ultrasound.      COMPARISON: 9/19/2018.      FINDINGS:  RIGHT lobe: 4.2 x 1.7 x 1.5 cm. Homogeneous echotexture.  Isthmus: 3 mm.  LEFT lobe: 4 x 1.2 x 1.4 cm. Homogeneous echotexture.     NODULES:     Nodule 1:  Hypoechoic solid nodule in the inferior right thyroid lobe  has decreased slightly in size, measuring 1.2 x 1 x 1.3 cm (previously  1.5 x 1.1 x 1.3 cm).   Composition: Solid or almost completely solid, 2 points   Echogenicity: Hypoechoic, 2 points   Shape: Wider-than-tall, 0 points   Margin: Ill-defined, 0 points   Echogenic Foci: None, or large comet-tail artifacts, 0 points   Point Total: 4-6 points. TI-RADS 4. If 1.5 cm or larger, recommend  FNA; if 1 cm or larger, follow up US (annually for 5 years).      Nodule 2: Hypoechoic solid nodule in the mid right thyroid lobe is not  significantly changed, measuring 0.7 x 0.7 x 0.6 cm.  Composition: Solid or almost completely solid, 2 points   Echogenicity: Hypoechoic, 2 points   Shape: Wider-than-tall, 0 points   Margin: Smooth, 0 points   Echogenic Foci: None, or large comet-tail artifacts, 0 points   Point Total: 4-6 points. TI-RADS 4. If 1.5 cm or larger, recommend  FNA; if 1 cm or larger, follow up US (annually for 5 years).     Nodule 3: Complex cystic and solid nodule in the inferior right  thyroid lobe has increased slightly in size, measuring 2.4 x 1.1 x 2.4  cm (previously 2 x 0.6 x 1.6 cm).   Composition: Mixed cystic and solid, 1 point   Echogenicity: Unable to determine, 1 point   Shape: Wider-than-tall, 0 points   Margin: Smooth, 0 points   Echogenic Foci: None, or large comet-tail artifacts, 0 points   Point Total: 1-2 points. TI-RADS 2. No FNA.     Nodule 4: Hypoechoic solid nodule in the inferior left thyroid lobe  has increased slightly in size, measuring 1.1 x 0.5 x 0.8 cm  (previously 0.6 x 0.3 x 0.6 cm).  Composition: Solid or almost completely solid, 2 points   Echogenicity: Hypoechoic, 2 points   Shape: Wider-than-tall, 0 points   Margin: Smooth, 0 points   Echogenic Foci: None, or large comet-tail artifacts, 0 points   Point Total: 4-6 points. TI-RADS 4. If 1.5 cm or larger, recommend  FNA; if 1 cm or larger, follow up US (annually for 5  years).                                                                      IMPRESSION: Four thyroid nodules are described above, two of which  have increased slightly in size, and one of which has decreased  slightly in size.     Nodules are characterized per  ACR Thyroid Imaging, Reporting and Data System (TI-RADS): White Paper  of the ACR TI-RADS Committee  Robert Linda et al. Journal of the American College of  Radiology 2017. Volume 14 (2017), Issue 5, 582-507.      OG MATHIAS MD

## 2018-11-26 ENCOUNTER — HOSPITAL ENCOUNTER (OUTPATIENT)
Dept: ULTRASOUND IMAGING | Facility: CLINIC | Age: 68
Discharge: HOME | End: 2018-11-26
Attending: NURSE PRACTITIONER | Admitting: NURSE PRACTITIONER
Payer: COMMERCIAL

## 2018-11-26 DIAGNOSIS — E04.2 MULTIPLE THYROID NODULES: Primary | ICD-10-CM

## 2018-11-26 DIAGNOSIS — N93.9 VAGINAL SPOTTING: ICD-10-CM

## 2018-11-26 DIAGNOSIS — F41.9 ANXIETY DUE TO INVASIVE PROCEDURE: ICD-10-CM

## 2018-11-26 PROCEDURE — 76856 US EXAM PELVIC COMPLETE: CPT

## 2018-11-26 RX ORDER — ALPRAZOLAM 0.5 MG
TABLET ORAL
Qty: 1 TABLET | Refills: 0 | Status: SHIPPED | OUTPATIENT
Start: 2018-11-26 | End: 2018-11-28

## 2018-11-28 ENCOUNTER — TELEPHONE (OUTPATIENT)
Dept: ENDOCRINOLOGY | Facility: CLINIC | Age: 68
End: 2018-11-28

## 2018-11-28 DIAGNOSIS — E04.2 MULTIPLE THYROID NODULES: ICD-10-CM

## 2018-11-28 DIAGNOSIS — F41.9 ANXIETY DUE TO INVASIVE PROCEDURE: ICD-10-CM

## 2018-11-28 RX ORDER — ALPRAZOLAM 0.5 MG
TABLET ORAL
Qty: 1 TABLET | Refills: 0 | Status: SHIPPED | OUTPATIENT
Start: 2018-11-28 | End: 2018-11-28

## 2018-11-28 RX ORDER — ALPRAZOLAM 0.5 MG
TABLET ORAL
Qty: 1 TABLET | Refills: 0 | Status: SHIPPED | OUTPATIENT
Start: 2018-11-28 | End: 2019-04-08

## 2018-11-28 NOTE — TELEPHONE ENCOUNTER
[facility] Call Center    Phone Message    May a detailed message be left on voicemail: yes    Reason for Call: Other: Sylvain from [facility] pharmacy states they need a signed RX because it's a controlled substance.  He is asking to have it resent.  PT wants to  at 4:30pm      Action Taken: Message routed to:  [facility] ([facility] endo

## 2018-11-28 NOTE — TELEPHONE ENCOUNTER
[facility] Call Center    Phone Message    May a detailed message be left on voicemail: yes    Reason for Call: Other: [facility] pharmacy is requesting to have [facility] pharmacy is requesting to tranfer the PT's RX for ALPRAZolam (XANAX) 0.5 MG tablet to them faxed or sent to them.  The RX was sent to [facility].  Fax number for [facility] pharmacy is [phone] and Fina can be reached at [phone] with any questions.      Action Taken: Message routed to:  [facility] ([facility] Jony

## 2018-11-28 NOTE — TELEPHONE ENCOUNTER
To clinic support staff: Please print Rx for me to sign and I will sign it.  This will then have to be urgently faxed to the requesting pharmacy.     Sherice Estrada MD

## 2018-12-17 ENCOUNTER — HOSPITAL ENCOUNTER (OUTPATIENT)
Dept: BONE DENSITY | Facility: CLINIC | Age: 68
Discharge: HOME | End: 2018-12-17
Payer: COMMERCIAL

## 2018-12-17 DIAGNOSIS — E04.2 MULTIPLE THYROID NODULES: ICD-10-CM

## 2018-12-17 DIAGNOSIS — M85.9 LOW BONE DENSITY: ICD-10-CM

## 2018-12-17 PROCEDURE — 77080 DXA BONE DENSITY AXIAL: CPT

## 2019-01-18 ENCOUNTER — OFFICE VISIT (OUTPATIENT)
Dept: OBGYN | Facility: CLINIC | Age: 69
End: 2019-01-18
Payer: COMMERCIAL

## 2019-01-18 VITALS
SYSTOLIC BLOOD PRESSURE: 167 MMHG | RESPIRATION RATE: 16 BRPM | DIASTOLIC BLOOD PRESSURE: 73 MMHG | BODY MASS INDEX: 22.08 KG/M2 | HEIGHT: 62 IN | WEIGHT: 120 LBS | TEMPERATURE: 96.9 F | HEART RATE: 77 BPM

## 2019-01-18 DIAGNOSIS — N95.0 PMB (POSTMENOPAUSAL BLEEDING): Primary | ICD-10-CM

## 2019-01-18 PROCEDURE — 99202 OFFICE O/P NEW SF 15 MIN: CPT | Mod: 25 | Performed by: OBSTETRICS & GYNECOLOGY

## 2019-01-18 PROCEDURE — 58100 BIOPSY OF UTERUS LINING: CPT | Performed by: OBSTETRICS & GYNECOLOGY

## 2019-01-18 PROCEDURE — 88305 TISSUE EXAM BY PATHOLOGIST: CPT | Performed by: OBSTETRICS & GYNECOLOGY

## 2019-01-18 ASSESSMENT — MIFFLIN-ST. JEOR: SCORE: 1032.57

## 2019-01-18 NOTE — PROGRESS NOTES
Arabella is a 68 year old   female who presents for consult as requested by Gloria Escobedo.  Pt states she had 2 episodes of vaginal spotting after menopause, once in Oct and once in Nov.  She does not take HRT, no OTC products for menopause; she has no pelvic pain, itching, dryness; she has prior remote h/o abnormal Pap, no treatment and subsequents have arvind normal  She had a pelvic sonogram which was normal, 3mm stripe, 8mm fibroid  She takes low dose ASA and notes a lot of spontaneous bruising..    Patient Active Problem List    Diagnosis Date Noted     Skin lesion 2015     Priority: Medium     CARDIOVASCULAR SCREENING; LDL GOAL LESS THAN 130 2015     Priority: Medium     Multiple thyroid nodules 2012     Priority: Medium     Hypertension 2012     Priority: Medium     Lisinopril caused cough -switched to losartan       Hyperlipidemia LDL goal <130 2012     Priority: Medium     Advanced directives, counseling/discussion 2012     Priority: Medium     Advance Directive Problem List Overview:   Name Relationship Phone    Primary Health Care Agent            Alternative Health Care Agent          Discussed advance care planning with patient; information given to patient to review. 3/26/2012        Osteopenia 2011     Priority: Medium     [facility] dexa Dual energy x-ray absorptiometry was performed on this 63.0 year old White Female, who reports - a history of: postmenopausal status, reformed tobacco habit, thyroid nodules, family history of osteoporosis or fractures - the following current treatments: Calcium, Vitamin D, lipid lowering agents _________________________________________________________________________ Technical quality: Satisfactory. _________________________________________________________________________ Densitometry results: Comparison: 2013, 2010 Region Date BMD T - score Z - score BMD change from baseline BMD % change from baseline L1-L4  2013 1.149 g/cm  -0.3 1.4 -0.011 g/cm  -0.9% L1-L4 2010 1.160 g/cm  -0.2 1.3 baseline baseline Neck Left 2013 0.914 g/cm  -0.9 0.7 Neck Left 2010 0.967 g/cm  -0.5 0.9 Total Left 2013 0.959 g/cm  -0.4 0.9 0.006 g/cm  0.6% Total Left 2010 0.953 g/cm  -0.4 0.7 baseline baseline Neck Right 2013 0.853 g/cm  -1.3 0.2 Neck Right 2010 0.876 g/cm  -1.2 0.2 Total Right 2013 0.886 g/cm  -1.0 0.3 -0.003 g/cm  -0.3% Total Right 2010 0.889 g/cm  -0.9 0.2 baseline baseline _________________________________________________________________________ FORMATTED RESULTS WITH TABLES ARE LOCATED IN [facility] UNDER CHART REVIEW < ENCOUNTERS < PROVIDER <  2013 . The following are the conclusions and suggestions: Conclusions: Based on the most negative and valid T-score of -1.3 at the level of the right femoral neck, this patient has low bone density. The risk of osteoporotic fracture increases approximately 2-fold for each 1.0 SD decrease in T-score.        Thyroid cyst 2011     Priority: Medium     Xeroderma 2010     Priority: Medium     Herpes simplex type 1 infection 2010     Priority: Medium       All systems were reviewed and pertinent information in noted in subjective/HPI.    Past Medical History:   Diagnosis Date     Hypertension      Osteopenia      Thyroid nodules [facility]     benign        Past Surgical History:   Procedure Laterality Date     ABDOMEN SURGERY  1986         BIOPSY  2014    Thyroid     C ANESTH, SECTION       COLONOSCOPY       COLONOSCOPY N/A 2018    Procedure: COLONOSCOPY;  colonoscopy;  Surgeon: Xu Feliciano MD;  Location: [facility]     EXCISE MASS WRIST Left 2016    Procedure: EXCISE MASS WRIST;  Surgeon: Germain Hull MD;  Location: [facility]     TONSILLECTOMY           Current Outpatient Medications:      aspirin 81 MG tablet, Take by mouth daily, Disp: , Rfl:      CALCIUM + D OR,  1 TABLET ORALLY DAILY, Disp: , Rfl:      hydrochlorothiazide (HYDRODIURIL) 25 MG tablet, Take 1 tablet (25 mg) by mouth daily Hold on file until needed, Disp: 90 tablet, Rfl: 3     losartan (COZAAR) 50 MG tablet, Take 1 tablet (50 mg) by mouth daily Hold on file until needed, Disp: 90 tablet, Rfl: 3     MULTI-VITAMIN OR, 1 TABLET ORALLY DAILY, Disp: , Rfl:      Omega-3 Fatty Acids ([employer] FISH OIL) 1200 MG CPDR, Take 1 capsule by mouth daily , Disp: , Rfl:      UNABLE TO FIND, MEDICATION NAME: Allergy relief nasal spray, Disp: , Rfl:      ALPRAZolam (XANAX) 0.5 MG tablet, Take 60-90 minutes before the procedure.  Must have a  for the procedure. (Patient not taking: Reported on 2019), Disp: 1 tablet, Rfl: 0     fluocinonide (LIDEX) 0.05 % cream, Apply sparingly to affected area twice daily as needed.  Do not apply to face. (Patient not taking: Reported on 2019), Disp: 60 g, Rfl: 1     valACYclovir (VALTREX) 1000 mg tablet, If you have a cold sore use 2 tab po bid for 2 days.  If you have the rash on your leg, 0.5 po twice for 3 days. Hold on file until needed (Patient not taking: Reported on 10/19/2018), Disp: 30 tablet, Rfl: 3    ALLERGIES:  Formaldehyde    Social History     Socioeconomic History     Marital status:      Spouse name: None     Number of children: None     Years of education: None     Highest education level: None   Social Needs     Financial resource strain: None     Food insecurity - worry: None     Food insecurity - inability: None     Transportation needs - medical: None     Transportation needs - non-medical: None   Occupational History     Employer: [employer]   Tobacco Use     Smoking status: Former Smoker     Packs/day: 0.50     Years: 9.00     Pack years: 4.50     Types: Cigarettes     Start date: 10/1/1972     Last attempt to quit: 1981     Years since quittin.7     Smokeless tobacco: Never Used     Tobacco comment: I quit in    Substance  "and Sexual Activity     Alcohol use: Yes     Comment: 1 o 2 then switch  to na     Drug use: No     Sexual activity: Not Currently     Partners: Male   Other Topics Concern     Parent/sibling w/ CABG, MI or angioplasty before 65F 55M? No      Service No     Blood Transfusions No     Caffeine Concern Yes     Comment: 3 cups cofffee a day     Occupational Exposure No     Hobby Hazards No     Sleep Concern No     Stress Concern No     Weight Concern No     Special Diet Yes     Comment: calcium with D one a day     Back Care No     Exercise Yes     Comment: couple times a wk     Bike Helmet No     Seat Belt Yes     Self-Exams No   Social History Narrative     None       Family History   Problem Relation Age of Onset     Lipids Mother      Arthritis Mother      Hypertension Mother      Hyperlipidemia Mother      Osteopenia Mother      Hypertension Father      Hyperlipidemia Father      Prostate Cancer Father      Hypertension Paternal Grandfather      Neurologic Disorder Brother         brain tumor     Other Cancer Brother         1997 Brain Surgery/1997 Brain Surgery     Depression Sister         early 1980s     Depression Niece      Diabetes No family hx of      Thyroid Cancer No family hx of      Thyroid Disease No family hx of        OBJECTIVE:  Vitals: /73 (BP Location: Right arm, Patient Position: Chair, Cuff Size: Adult Small)   Pulse 77   Temp 96.9  F (36.1  C) (Tympanic)   Resp 16   Ht 1.575 m (5' 2\")   Wt 54.4 kg (120 lb)   BMI 21.95 kg/m   BMI= Body mass index is 21.95 kg/m .   No LMP recorded. Patient is postmenopausal.     GENERAL APPEARANCE: healthy, alert and no distress   PELVIC:  EGBUS normal, vagina poorly estrogenized and well supported, no unusual discharge, cervix grossly normal, PAP not taken, uterus normal in size and configuration,     The cervix was visualized with the speculum, and cleansed with an iodine solution.  The anterior cervix was grasped with a tenaculum and a " pipelle advanced into the uterine cavity, with a sounded depth of 7 cm.  A representative sample was aspirated from the cavity and sent for pathological examination.      ASSESSMENT:      ICD-10-CM    1. PMB (postmenopausal bleeding) N95.0 Surgical pathology exam     ENDOMETRIAL BIOPSY W/O CERVICAL DILATION       PLAN:  If the ENDOMETRIAL BIOPSY is normal then I suggest she stop the low dose ASA as it is likely causing bleeding in atrophic tissues.  Ed Moise MD  [facility]    C: Gloria Escobedo    Duration of visit:  30 minutes, >50% in discussion of current issues, treatment options and treatment planning.  DE Moise MD

## 2019-01-23 LAB — COPATH REPORT: NORMAL

## 2019-03-28 ENCOUNTER — OFFICE VISIT (OUTPATIENT)
Dept: FAMILY MEDICINE | Facility: CLINIC | Age: 69
End: 2019-03-28
Payer: COMMERCIAL

## 2019-03-28 ENCOUNTER — OFFICE VISIT (OUTPATIENT)
Dept: ORTHOPEDICS | Facility: CLINIC | Age: 69
End: 2019-03-28
Payer: COMMERCIAL

## 2019-03-28 ENCOUNTER — ANCILLARY PROCEDURE (OUTPATIENT)
Dept: GENERAL RADIOLOGY | Facility: CLINIC | Age: 69
End: 2019-03-28
Attending: FAMILY MEDICINE
Payer: COMMERCIAL

## 2019-03-28 ENCOUNTER — HOSPITAL ENCOUNTER (OUTPATIENT)
Dept: MAMMOGRAPHY | Facility: CLINIC | Age: 69
Discharge: HOME | End: 2019-03-28
Attending: NURSE PRACTITIONER | Admitting: NURSE PRACTITIONER
Payer: COMMERCIAL

## 2019-03-28 VITALS
BODY MASS INDEX: 22.26 KG/M2 | WEIGHT: 121 LBS | SYSTOLIC BLOOD PRESSURE: 125 MMHG | DIASTOLIC BLOOD PRESSURE: 80 MMHG | HEIGHT: 62 IN

## 2019-03-28 VITALS
DIASTOLIC BLOOD PRESSURE: 70 MMHG | HEIGHT: 62 IN | OXYGEN SATURATION: 99 % | WEIGHT: 120.9 LBS | TEMPERATURE: 97.7 F | HEART RATE: 76 BPM | SYSTOLIC BLOOD PRESSURE: 150 MMHG | RESPIRATION RATE: 18 BRPM | BODY MASS INDEX: 22.25 KG/M2

## 2019-03-28 DIAGNOSIS — I10 ESSENTIAL HYPERTENSION: ICD-10-CM

## 2019-03-28 DIAGNOSIS — M25.551 ACUTE RIGHT HIP PAIN: ICD-10-CM

## 2019-03-28 DIAGNOSIS — G89.29 CHRONIC MIDLINE LOW BACK PAIN WITHOUT SCIATICA: ICD-10-CM

## 2019-03-28 DIAGNOSIS — E78.5 HYPERLIPIDEMIA LDL GOAL <130: ICD-10-CM

## 2019-03-28 DIAGNOSIS — M51.369 DDD (DEGENERATIVE DISC DISEASE), LUMBAR: ICD-10-CM

## 2019-03-28 DIAGNOSIS — Z12.31 VISIT FOR SCREENING MAMMOGRAM: ICD-10-CM

## 2019-03-28 DIAGNOSIS — M16.11 PRIMARY LOCALIZED OSTEOARTHRITIS OF RIGHT HIP: ICD-10-CM

## 2019-03-28 DIAGNOSIS — Z00.00 ANNUAL PHYSICAL EXAM: Primary | ICD-10-CM

## 2019-03-28 DIAGNOSIS — M54.50 CHRONIC MIDLINE LOW BACK PAIN WITHOUT SCIATICA: ICD-10-CM

## 2019-03-28 DIAGNOSIS — M25.551 ACUTE RIGHT HIP PAIN: Primary | ICD-10-CM

## 2019-03-28 LAB
ANION GAP SERPL CALCULATED.3IONS-SCNC: 7 MMOL/L (ref 3–14)
BUN SERPL-MCNC: 14 MG/DL (ref 7–30)
CALCIUM SERPL-MCNC: 9.3 MG/DL (ref 8.5–10.1)
CHLORIDE SERPL-SCNC: 97 MMOL/L (ref 94–109)
CHOLEST SERPL-MCNC: 265 MG/DL
CO2 SERPL-SCNC: 29 MMOL/L (ref 20–32)
CREAT SERPL-MCNC: 0.8 MG/DL (ref 0.52–1.04)
GFR SERPL CREATININE-BSD FRML MDRD: 75 ML/MIN/{1.73_M2}
GLUCOSE SERPL-MCNC: 97 MG/DL (ref 70–99)
HDLC SERPL-MCNC: 66 MG/DL
LDLC SERPL CALC-MCNC: 173 MG/DL
NONHDLC SERPL-MCNC: 199 MG/DL
POTASSIUM SERPL-SCNC: 3.7 MMOL/L (ref 3.4–5.3)
SODIUM SERPL-SCNC: 133 MMOL/L (ref 133–144)
TRIGL SERPL-MCNC: 128 MG/DL

## 2019-03-28 PROCEDURE — 73502 X-RAY EXAM HIP UNI 2-3 VIEWS: CPT

## 2019-03-28 PROCEDURE — 80048 BASIC METABOLIC PNL TOTAL CA: CPT | Performed by: NURSE PRACTITIONER

## 2019-03-28 PROCEDURE — 80061 LIPID PANEL: CPT | Performed by: NURSE PRACTITIONER

## 2019-03-28 PROCEDURE — 99213 OFFICE O/P EST LOW 20 MIN: CPT | Mod: 25 | Performed by: NURSE PRACTITIONER

## 2019-03-28 PROCEDURE — 77063 BREAST TOMOSYNTHESIS BI: CPT

## 2019-03-28 PROCEDURE — 99397 PER PM REEVAL EST PAT 65+ YR: CPT | Performed by: NURSE PRACTITIONER

## 2019-03-28 PROCEDURE — 72100 X-RAY EXAM L-S SPINE 2/3 VWS: CPT

## 2019-03-28 PROCEDURE — 36415 COLL VENOUS BLD VENIPUNCTURE: CPT | Performed by: NURSE PRACTITIONER

## 2019-03-28 PROCEDURE — 99203 OFFICE O/P NEW LOW 30 MIN: CPT | Performed by: FAMILY MEDICINE

## 2019-03-28 RX ORDER — ATORVASTATIN CALCIUM 10 MG/1
10 TABLET, FILM COATED ORAL DAILY
Qty: 90 TABLET | Refills: 1 | Status: SHIPPED | OUTPATIENT
Start: 2019-03-28 | End: 2019-03-29 | Stop reason: SINTOL

## 2019-03-28 RX ORDER — LOSARTAN POTASSIUM 50 MG/1
75 TABLET ORAL DAILY
Qty: 135 TABLET | Refills: 1 | Status: SHIPPED | OUTPATIENT
Start: 2019-03-28 | End: 2019-09-26

## 2019-03-28 ASSESSMENT — MIFFLIN-ST. JEOR
SCORE: 1037.1
SCORE: 1036.65

## 2019-03-28 NOTE — PROGRESS NOTES
"SUBJECTIVE:   Arabella Rodríguez is a 68 year old female who presents for Preventive Visit.  Are you in the first 12 months of your Medicare Part B coverage?  No    Physical Health:    In general, how would you rate your overall physical health? good    Outside of work, how many days during the week do you exercise? none right now due to low back pain, stats PT on Thursday and will exercise after she finds out what she can do.    Outside of work, approximately how many minutes a day do you exercise?not applicable    If you drink alcohol do you typically have >3 drinks per day or >7 drinks per week? No    Do you usually eat at least 4 servings of fruit and vegetables a day, include whole grains & fiber and avoid regularly eating high fat or \"junk\" foods? NO    Do you have any problems taking medications regularly?  No    Do you have any side effects from medications? none    Needs assistance for the following daily activities: no assistance needed    Which of the following safety concerns are present in your home?  none identified     Hearing impairment: No    In the past 6 months, have you been bothered by leaking of urine? no    Mental Health:    In general, how would you rate your overall mental or emotional health? excellent  PHQ-2 Score:      Do you feel safe in your environment? Yes    Do you have a Health Care Directive? Yes: Advance Directive has been received and scanned.    Additional concerns to address?  YES    Sleep Problem: For the past week, she has been waking up in the middle of the night, is able to go back to asleep.     Fall risk:  Fallen 2 or more times in the past year?: No  Any fall with injury in the past year?: No  click delete button to remove this line now  Cognitive Screenin) Repeat 3 items (Leader, Season, Table)    2) Clock draw:   NORMAL  3) 3 item recall:   Recalls 3 objects  Results: 3 items recalled: COGNITIVE IMPAIRMENT LESS LIKELY    Mini-CogTM Copyright ELSI Blake. Licensed by " the author for use in [facility]; reprinted with permission (patrick@[facility]). All rights reserved.      Do you have sleep apnea, excessive snoring or daytime drowsiness?: no    Reviewed and updated as needed this visit by clinical staff  Tobacco  Allergies  Meds  Med Hx  Surg Hx  Fam Hx  Soc Hx        Reviewed and updated as needed this visit by Provider        Social History     Tobacco Use     Smoking status: Former Smoker     Packs/day: 0.50     Years: 9.00     Pack years: 4.50     Types: Cigarettes     Start date: 10/1/1972     Last attempt to quit: 1981     Years since quittin.9     Smokeless tobacco: Never Used     Tobacco comment: I quit in    Substance Use Topics     Alcohol use: Yes     Comment: 1 o 2 then switch  to na                           Current providers sharing in care for this patient include:   Patient Care Team:  Gloria Escobedo APRN CNP as PCP - General (Nurse Practitioner - Gerontology)  Sherice Estrada MD as MD (INTERNAL MEDICINE - ENDOCRINOLOGY, DIABETES & METABOLISM)  Gloria Escobedo APRN CNP as Assigned PCP    The following health maintenance items are reviewed in Epic and correct as of today:  Health Maintenance   Topic Date Due     ZOSTER IMMUNIZATION (2 of 3) 2012     LOW DOSE ASA FOR PRIMARY PREVENTION  2019     MEDICARE ANNUAL WELLNESS VISIT  2019     FALL RISK ASSESSMENT  2019     MAMMO SCREEN Q2 YR (SYSTEM ASSIGNED)  2020     PHQ-2 Q1 YR  2020     LIPID SCREEN Q5 YR FEMALE (SYSTEM ASSIGNED)  2023     ADVANCE DIRECTIVE PLANNING Q5 YRS  2023     DTAP/TDAP/TD IMMUNIZATION (3 - Td) 2025     COLONOSCOPY Q10 YR  2028     DEXA SCAN SCREENING (SYSTEM ASSIGNED)  Completed     INFLUENZA VACCINE  Completed     HEPATITIS C SCREENING  Completed     IPV IMMUNIZATION  Aged Out     MENINGITIS IMMUNIZATION  Aged Out       ROS:  Constitutional, HEENT, cardiovascular, pulmonary, gi and gu  "systems are negative, except as otherwise noted.    OBJECTIVE:   /70 (BP Location: Left arm, Patient Position: Sitting, Cuff Size: Adult Regular)   Pulse 76   Temp 97.7  F (36.5  C) (Tympanic)   Resp 18   Ht 1.575 m (5' 2\")   Wt 54.8 kg (120 lb 14.4 oz)   SpO2 99%   BMI 22.11 kg/m   Estimated body mass index is 22.11 kg/m  as calculated from the following:    Height as of this encounter: 1.575 m (5' 2\").    Weight as of this encounter: 54.8 kg (120 lb 14.4 oz).  EXAM:   GENERAL: healthy, alert and no distress  EYES: Eyes grossly normal to inspection, PERRL and conjunctivae and sclerae normal  HENT: ear canals and TM's normal, nose and mouth without ulcers or lesions  RESP: lungs clear to auscultation - no rales, rhonchi or wheezes  CV: regular rate and rhythm, normal S1 S2, no S3 or S4, no murmur, click or rub, no peripheral edema and peripheral pulses strong  NEURO: Normal strength and tone, mentation intact and speech normal  PSYCH: mentation appears normal, affect normal/bright    Diagnostic Test Results:  Results for orders placed or performed in visit on 03/28/19 (from the past 24 hour(s))   Lipid panel reflex to direct LDL Fasting   Result Value Ref Range    Cholesterol 265 (H) <200 mg/dL    Triglycerides 128 <150 mg/dL    HDL Cholesterol 66 >49 mg/dL    LDL Cholesterol Calculated 173 (H) <100 mg/dL    Non HDL Cholesterol 199 (H) <130 mg/dL   Basic metabolic panel   Result Value Ref Range    Sodium 133 133 - 144 mmol/L    Potassium 3.7 3.4 - 5.3 mmol/L    Chloride 97 94 - 109 mmol/L    Carbon Dioxide 29 20 - 32 mmol/L    Anion Gap 7 3 - 14 mmol/L    Glucose 97 70 - 99 mg/dL    Urea Nitrogen 14 7 - 30 mg/dL    Creatinine 0.80 0.52 - 1.04 mg/dL    GFR Estimate 75 >60 mL/min/[1.73_m2]    GFR Estimate If Black 87 >60 mL/min/[1.73_m2]    Calcium 9.3 8.5 - 10.1 mg/dL       ASSESSMENT / PLAN:   1. Annual physical exam  - Lipid panel reflex to direct LDL Fasting  -scheduled for mammogram today    2. " "Essential hypertension  -uncontrolled  -increased Cozaar to 75 mg daily and continue hydrochlorothiazide 25 mg daily  -monitor BP at home and follow up with RN in 1-2 weeks for BP recheck   - losartan (COZAAR) 50 MG tablet; Take 1.5 tablets (75 mg) by mouth daily  Dispense: 135 tablet; Refill: 1  - Basic metabolic panel    3. Hyperlipidemia LDL goal <130  -chronic history of hyperlipidemia, never been on statin in the past, tried diet and lifestyle modifications, as well as fish oil and fiber supplement without significant improvement, Newman 10 years cardiovascular disease risk  is 24% recommended statin therapy  - start atorvastatin (LIPITOR) 10 MG tablet; Take 1 tablet (10 mg) by mouth daily  Dispense: 90 tablet; Refill: 1  -repeat lipid panel in 6 months  -follow low fat, or Mediterranean diet   - Lipid panel reflex to direct LDL Fasting; Future    End of Life Planning:  Patient currently has an advanced directive: Yes.  Practitioner is supportive of decision.    COUNSELING:  Reviewed preventive health counseling, as reflected in patient instructions       Regular exercise       Healthy diet/nutrition    BP Readings from Last 1 Encounters:   03/28/19 150/70     Estimated body mass index is 22.11 kg/m  as calculated from the following:    Height as of this encounter: 1.575 m (5' 2\").    Weight as of this encounter: 54.8 kg (120 lb 14.4 oz).           reports that she quit smoking about 37 years ago. Her smoking use included cigarettes. She started smoking about 46 years ago. She has a 4.50 pack-year smoking history. she has never used smokeless tobacco.      Appropriate preventive services were discussed with this patient, including applicable screening as appropriate for cardiovascular disease, diabetes, osteopenia/osteoporosis, and glaucoma.  As appropriate for age/gender, discussed screening for colorectal cancer, prostate cancer, breast cancer, and cervical cancer. Checklist reviewing preventive services " available has been given to the patient.    Reviewed patients plan of care and provided an AVS. The Basic Care Plan (routine screening as documented in Health Maintenance) for Arabella meets the Care Plan requirement. This Care Plan has been established and reviewed with the Patient.    Counseling Resources:  ATP IV Guidelines  Pooled Cohorts Equation Calculator  Breast Cancer Risk Calculator  FRAX Risk Assessment  ICSI Preventive Guidelines  Dietary Guidelines for Americans, 2010  USDA's MyPlate  ASA Prophylaxis  Lung CA Screening    THOMAS Butler CNP  [facility] [place], [place]

## 2019-03-28 NOTE — PROGRESS NOTES
"Arabella Rodríguez  :  1950  DOS: 3/28/2019  MRN: [id number]    Sports Medicine Clinic Visit    PCP: Gloria Escobedo    Arabella Rodríguez is a 68 year old female who is seen as a self referral presenting with right hip and low back pain.    Injury: Gradual onset of low back pain and right hip pain over the last ~ 8 weeks (2019).  Pain located over right deep anterior hip, generalized midline lower lumber spine, sacral region, radiating to right LE.  Reports intermittent radiating, pain to right anterior thigh and knee.  Additional Features:  Positive: grinding, weakness and \"zapping\" pain.  Symptoms are better with Ibuprofen and Rest.  Symptoms are worse with: going from sit to stand, walking, bending at waist, lifting.  Other evaluation and/or treatments so far consists of: Ibuprofen and Rest.  Recent imaging completed: No recent imaging completed.  Prior History of related problems: H/o intermittent generalized low back pain, usually self manages.    Social History: retired - teaches part time online      Review of Systems  Musculoskeletal: as above  Remainder of review of systems is negative including constitutional, CV, pulmonary, GI, Skin and Neurologic except as noted in HPI or medical history.    Past Medical History:   Diagnosis Date     Hypertension      Osteopenia      Thyroid nodules [facility]     benign      Past Surgical History:   Procedure Laterality Date     ABDOMEN SURGERY  1986         BIOPSY  2014    Thyroid     C ANESTH, SECTION       COLONOSCOPY       COLONOSCOPY N/A 2018    Procedure: COLONOSCOPY;  colonoscopy;  Surgeon: Xu Feliciano MD;  Location: [facility]     EXCISE MASS WRIST Left 2016    Procedure: EXCISE MASS WRIST;  Surgeon: Germain Hull MD;  Location: [facility]     TONSILLECTOMY         Objective  /80   Ht 1.575 m (5' 2\")   Wt 54.9 kg (121 lb)   BMI 22.13 kg/m      General: healthy, alert and in no distress  "   HEENT: no scleral icterus or conjunctival erythema   Skin: no suspicious lesions or rash. No jaundice.   CV: regular rhythm by palpation, 2+ distal pulses, no pedal edema    Resp: normal respiratory effort without conversational dyspnea   Psych: normal mood and affect    Gait: nonantalgic, appropriate coordination and balance   Neuro: normal light touch sensory exam of the extremities. Motor strength as noted below     Right hip exam    Inspection:        no edema or ecchymosis in hip area    ROM:       Flexion 110       internal rotation 15      external rotation 30      Range of motion limited by pain    Strength:        flexion 4/5       extension 4/5       abduction 3/5       adduction 4/5    Tender:        greater trochanter       Anterior hip joint       Mild ipsilateral SI joint TTP    Non Tender:        remainder of hip area       illiac crest       ASIS       pubis    Sensation:        grossly intact in hip and thigh    Skin:       well perfused       capillary refill brisk    Special Tests:        neg (-) ALLEY       positive (+) FADIR       positive (+) scour       neg (-) Randall      Low back exam:    Inspection:       no visible deformity in the low back       normal skin       normal vascular       normal lymphatic    ROM:       full flexion, mild groin pain       Full extension       asymmetric rotation of the pelvis with flexion       Mild pain with SB and rotation       Decreased hamstring flexibility    Tender:       paraspinal muscles very mild R>L       Lower lumbar, b/l SI    Non Tender:       remainder of lumbar spine    Strength:       hip flexion 5/5       knee extension 5/5       ankle dorsiflexion 5/5       ankle plantarflexion 5/5       dorsiflexion of the great toe 5/5    Reflexes:       patellar (L3, L4) symmetric normal       achilles tendons (S1) symmetric normal    Sensation:      grossly intact throughout lower extremities    Skin:       well perfused       capillary refill  brisk    Special tests:       straight leg raise left neg for radicular sx         straight leg raise right neg for radicular sx       Neg slump      Radiology:  XR images independently visualized and reviewed with patient today in clinic  Lower lumbar DDD changes, R>>L hip OA changes, no clear acute findings, will follow final radiology read    Assessment:  1. Acute right hip pain    2. Primary localized osteoarthritis of right hip    3. Chronic midline low back pain without sciatica    4. DDD (degenerative disc disease), lumbar        Plan:  Discussed the assessment with the patient.  Follow up: prn based on short term progress with PT  Start PT for ROM and safe low impact strengthening  Reviewed options like pool activity/water aerobics  Consider CSI in future for diagnostic and therapeutic value, declined option today  Can consider further intervention/referral based on clinical course  Safe OTC medication strategies reviewed  Home handouts provided and supportive care reviewed  All questions were answered today  Contact us with additional questions or concerns  Signs and sx of concern reviewed      Valdo Olsen DO, ABDOULAYE  Primary Care Sports Medicine  [facility]             Disclaimer: This note consists of symbols derived from keyboarding, dictation and/or voice recognition software. As a result, there may be errors in the script that have gone undetected. Please consider this when interpreting information found in this chart.

## 2019-03-28 NOTE — LETTER
"    3/28/2019         RE: Arabella Rodríguez  [address] [place]  [facility] [address]        Dear Colleague,    Thank you for referring your patient, Arabella Rodríguez, to the [facility]. Please see a copy of my visit note below.    Arabella Rodríguez  :  1950  DOS: 3/28/2019  MRN: [id number]    Sports Medicine Clinic Visit    PCP: Gloria Escobedo    Arabella Rodríguez is a 68 year old female who is seen as a self referral presenting with right hip and low back pain.    Injury: Gradual onset of low back pain and right hip pain over the last ~ 8 weeks (2019).  Pain located over right deep anterior hip, generalized midline lower lumber spine, sacral region, radiating to right LE.  Reports intermittent radiating, pain to right anterior thigh and knee.  Additional Features:  Positive: grinding, weakness and \"zapping\" pain.  Symptoms are better with Ibuprofen and Rest.  Symptoms are worse with: going from sit to stand, walking, bending at waist, lifting.  Other evaluation and/or treatments so far consists of: Ibuprofen and Rest.  Recent imaging completed: No recent imaging completed.  Prior History of related problems: H/o intermittent generalized low back pain, usually self manages.    Social History: retired - teaches part time online      Review of Systems  Musculoskeletal: as above  Remainder of review of systems is negative including constitutional, CV, pulmonary, GI, Skin and Neurologic except as noted in HPI or medical history.    Past Medical History:   Diagnosis Date     Hypertension      Osteopenia      Thyroid nodules [facility]     benign      Past Surgical History:   Procedure Laterality Date     ABDOMEN SURGERY  1986         BIOPSY  2014    Thyroid     C ANESTH, SECTION       COLONOSCOPY       COLONOSCOPY N/A 2018    Procedure: COLONOSCOPY;  colonoscopy;  Surgeon: Xu Feliciano MD;  Location: [facility] " "MASS WRIST Left 6/21/2016    Procedure: EXCISE MASS WRIST;  Surgeon: Germain Hull MD;  Location: [facility] OR     TONSILLECTOMY         Objective  /80   Ht 1.575 m (5' 2\")   Wt 54.9 kg (121 lb)   BMI 22.13 kg/m       General: healthy, alert and in no distress    HEENT: no scleral icterus or conjunctival erythema   Skin: no suspicious lesions or rash. No jaundice.   CV: regular rhythm by palpation, 2+ distal pulses, no pedal edema    Resp: normal respiratory effort without conversational dyspnea   Psych: normal mood and affect    Gait: nonantalgic, appropriate coordination and balance   Neuro: normal light touch sensory exam of the extremities. Motor strength as noted below     Right hip exam    Inspection:        no edema or ecchymosis in hip area    ROM:       Flexion 110       internal rotation 15      external rotation 30      Range of motion limited by pain    Strength:        flexion 4/5       extension 4/5       abduction 3/5       adduction 4/5    Tender:        greater trochanter       Anterior hip joint       Mild ipsilateral SI joint TTP    Non Tender:        remainder of hip area       illiac crest       ASIS       pubis    Sensation:        grossly intact in hip and thigh    Skin:       well perfused       capillary refill brisk    Special Tests:        neg (-) ALLEY       positive (+) FADIR       positive (+) scour       neg (-) Randall      Low back exam:    Inspection:       no visible deformity in the low back       normal skin       normal vascular       normal lymphatic    ROM:       full flexion, mild groin pain       Full extension       asymmetric rotation of the pelvis with flexion       Mild pain with SB and rotation       Decreased hamstring flexibility    Tender:       paraspinal muscles very mild R>L       Lower lumbar, b/l SI    Non Tender:       remainder of lumbar spine    Strength:       hip flexion 5/5       knee extension 5/5       ankle dorsiflexion 5/5       ankle " plantarflexion 5/5       dorsiflexion of the great toe 5/5    Reflexes:       patellar (L3, L4) symmetric normal       achilles tendons (S1) symmetric normal    Sensation:      grossly intact throughout lower extremities    Skin:       well perfused       capillary refill brisk    Special tests:       straight leg raise left neg for radicular sx         straight leg raise right neg for radicular sx       Neg slump      Radiology:  XR images independently visualized and reviewed with patient today in clinic  Lower lumbar DDD changes, R>>L hip OA changes, no clear acute findings, will follow final radiology read    Assessment:  1. Acute right hip pain    2. Primary localized osteoarthritis of right hip    3. Chronic midline low back pain without sciatica    4. DDD (degenerative disc disease), lumbar        Plan:  Discussed the assessment with the patient.  Follow up: prn based on short term progress with PT  Start PT for ROM and safe low impact strengthening  Reviewed options like pool activity/water aerobics  Consider CSI in future for diagnostic and therapeutic value, declined option today  Can consider further intervention/referral based on clinical course  Safe OTC medication strategies reviewed  Home handouts provided and supportive care reviewed  All questions were answered today  Contact us with additional questions or concerns  Signs and sx of concern reviewed      Valdo Olsen DO, CAQ  Primary Care Sports Medicine  [facility]             Disclaimer: This note consists of symbols derived from keyboarding, dictation and/or voice recognition software. As a result, there may be errors in the script that have gone undetected. Please consider this when interpreting information found in this chart.      Again, thank you for allowing me to participate in the care of your patient.        Sincerely,        Valdo Olsen DO

## 2019-03-28 NOTE — PATIENT INSTRUCTIONS
BP uncontrolled  Increase Cozaar to 75 mg daily and continue hydrochlorothiazide  25 mg daily   Contact endocrinologist about biopsy order  Labs: lipids, kidney function    Preventive Health Recommendations    See your health care provider every year to    Review health changes.     Discuss preventive care.      Review your medicines if your doctor has prescribed any.      You no longer need a yearly Pap test unless you've had an abnormal Pap test in the past 10 years. If you have vaginal symptoms, such as bleeding or discharge, be sure to talk with your provider about a Pap test.      Every 1 to 2 years, have a mammogram.  If you are over 69, talk with your health care provider about whether or not you want to continue having screening mammograms.      Every 10 years, have a colonoscopy. Or, have a yearly FIT test (stool test). These exams will check for colon cancer.       Have a cholesterol test every 5 years, or more often if your doctor advises it.       Have a diabetes test (fasting glucose) every three years. If you are at risk for diabetes, you should have this test more often.       At age 65, have a bone density scan (DEXA) to check for osteoporosis (brittle bone disease).    Shots:    Get a flu shot each year.    Get a tetanus shot every 10 years.    Talk to your doctor about your pneumonia vaccines. There are now two you should receive - Pneumovax (PPSV 23) and Prevnar (PCV 13).    Talk to your pharmacist about the shingles vaccine.    Talk to your doctor about the hepatitis B vaccine.    Nutrition:     Eat at least 5 servings of fruits and vegetables each day.      Eat whole-grain bread, whole-wheat pasta and brown rice instead of white grains and rice.      Get adequate Calcium and Vitamin D.     Lifestyle    Exercise at least 150 minutes a week (30 minutes a day, 5 days a week). This will help you control your weight and prevent disease.      Limit alcohol to one drink per day.      No smoking.        Wear sunscreen to prevent skin cancer.       See your dentist twice a year for an exam and cleaning.      See your eye doctor every 1 to 2 years to screen for conditions such as glaucoma, macular degeneration and cataracts.    Personalized Prevention Plan  You are due for the preventive services outlined below.  Your care team is available to assist you in scheduling these services.  If you have already completed any of these items, please share that information with your care team to update in your medical record.  Health Maintenance Due   Topic Date Due     Zoster (Shingles) Vaccine (2 of 3) 06/26/2012     LOW DOSE ASA FOR PRIMARY PREVENTION  03/27/2019     Annual Wellness Visit  03/27/2019     FALL RISK ASSESSMENT  03/27/2019     Depression Assessment 2 - yearly  03/27/2019

## 2019-03-29 ENCOUNTER — MYC MEDICAL ADVICE (OUTPATIENT)
Dept: FAMILY MEDICINE | Facility: CLINIC | Age: 69
End: 2019-03-29

## 2019-03-29 NOTE — TELEPHONE ENCOUNTER
Gloria,    Patient sends us a my chart message about medications and health concerns. Josefina PARRA RN     n/a

## 2019-04-04 ENCOUNTER — HOSPITAL ENCOUNTER (OUTPATIENT)
Dept: PHYSICAL THERAPY | Facility: CLINIC | Age: 69
Setting detail: THERAPIES SERIES
End: 2019-04-04
Attending: FAMILY MEDICINE
Payer: COMMERCIAL

## 2019-04-04 DIAGNOSIS — M54.50 CHRONIC MIDLINE LOW BACK PAIN WITHOUT SCIATICA: ICD-10-CM

## 2019-04-04 DIAGNOSIS — M25.551 ACUTE RIGHT HIP PAIN: ICD-10-CM

## 2019-04-04 DIAGNOSIS — M16.11 PRIMARY LOCALIZED OSTEOARTHRITIS OF RIGHT HIP: ICD-10-CM

## 2019-04-04 DIAGNOSIS — M51.369 DDD (DEGENERATIVE DISC DISEASE), LUMBAR: ICD-10-CM

## 2019-04-04 DIAGNOSIS — G89.29 CHRONIC MIDLINE LOW BACK PAIN WITHOUT SCIATICA: ICD-10-CM

## 2019-04-04 PROCEDURE — 97161 PT EVAL LOW COMPLEX 20 MIN: CPT | Mod: GP | Performed by: PHYSICAL THERAPIST

## 2019-04-04 PROCEDURE — 97110 THERAPEUTIC EXERCISES: CPT | Mod: GP | Performed by: PHYSICAL THERAPIST

## 2019-04-04 NOTE — PROGRESS NOTES
Arabella Marcos   OP PT Initial Evaluation  04/04/19 0900   General Information   Type of Visit Initial OP Ortho PT Evaluation   Start of Care Date 04/04/19   Referring Physician Valdo Olsen DO   Patient/Family Goals Statement get rid of the pain, get stronger, be able to walk 2 miles without pain   Orders Evaluate and Treat   Insurance Type Other   Insurance Comments/Visits Authorized Preffered One   Medical Diagnosis DDD, Acute right hip pain, OA of R hip   Surgical/Medical history reviewed Yes   Precautions/Limitations no known precautions/limitations   General Information Comments Oseteopenia, HBP   Body Part(s)   Body Part(s) Hip   Presentation and Etiology   Pertinent history of current problem (include personal factors and/or comorbidities that impact the POC) Pt is accustomed to walking a couple miles a day and but started having R hip pain. She saw another doctor who told her she has osteopenia and tried WB exercises. The exercises worked for a while but then her pain returned. She then got new shoes helped relief some of her pain initially. Saw Dr. Olsen and wants to trail PT. She denies any vague symtpoms in the hip of legs. She is also having has neck and arm that she may come to PT for soon.    Impairments A. Pain;E. Decreased flexibility   Functional Limitations perform activities of daily living;perform desired leisure / sports activities   Symptom Location R hip, low emmanuel   How/Where did it occur From insidious onset;With repetition/overuse;From Degenerative Joint Disease   Onset date of current episode/exacerbation 02/01/19   Chronicity New   Pain rating (0-10 point scale) Worst (/10);Best (/10)   Best (/10) 1   Worst (/10) 2   Pain quality A. Sharp;C. Aching   Frequency of pain/symptoms C. With activity   Pain/symptoms are: Worse during the day   Pain/symptoms exacerbated by B. Walking;F. Nothing   Pain/symptoms eased by A. Sitting;D. Nothing;K. Other   Pain eased by comment changing  shoes, hip   Progression of symptoms since onset: Improved   Current / Previous Interventions   Diagnostic Tests: X-ray   X-ray Results Results   X-ray results R hip arthirits   Prior Level of Function   Prior Level of Function-Mobility Independent with ADLs/IADLs used to walk 2 miles a day   Current Level of Function   Patient role/employment history H. Other;F. 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 No   Have you fallen and had an injury in the past year? No   Is patient a fall risk? No   System Outcome Measures   Outcome Measures Low Back Pain (see Oswestry and Oscar)  (LEFs-53/80, BENNY 14%, LOW RISK)   Hip Objective Findings   Side (if bilateral, select both right and left) Right   Integumentary  no redness, bruising or edema   Posture slightly forward   Gait/Locomotion slightly antalgic on the, very slight R lean   Balance/Proprioception (Single Leg Stance) fair on R   Lumbar ROM full flexion no pain, extension 25% limited with slight pain reduction with repeated, side bending full janet   Pelvic Screen unremarkable   Functional Closed Chain Screen pain free closed chain squat   Gluteal Tendopathy Test -   Straight Leg Raise Test +R   Scour Test +R   ALLEY Test -   FADIR Test +R   Neurological Testing Comments unremarkable   Palpation slight tenderness in glute med, R lumbar paraspinals   Accessory Motion/Joint Mobility CPA/UPA L1-L5 unremarkable, hip distraciton normal   Right Hip Flexion PROM 110 pain   Right Hip Abduction PROM 45 no pain   Right Hip ER PROM 30 slight pain   Right Hip IR PROM 15 pain   Right Hip Ext PROM 10 no pain   Right Hip Flexion Strength 5/5   Right Hip Abduction Strength 4/5 slight pain   Right Hip Extension Strength 4/5   Right Knee Flexion Strength 5/5   Right Knee Extension Strength 5/5   Demond Flexibility Test tight psoas with pain on the R   Obers/ITB Flexibility -    Right Hamstring  Flexibility slight tightness   Right Prone Quad Flexibility full   Planned Therapy Interventions   Planned Therapy Interventions strengthening;ROM;stretching;neuromuscular re-education;joint mobilization;manual therapy;gait training   Planned Therapy Interventions Comment to address above impairments, improve overall functional capacity   Clinical Impression   Criteria for Skilled Therapeutic Interventions Met yes, treatment indicated   PT Diagnosis R hip pain, LBP   Influenced by the following impairments pain, weakness, decreased ROM   Functional limitations due to impairments impaired gait, decreased functional strength   Clinical Presentation Stable/Uncomplicated   Clinical Presentation Rationale PT judgement, subjective, report, objective data   Clinical Decision Making (Complexity) Low complexity   Therapy Frequency 1 time/week   Predicted Duration of Therapy Intervention (days/wks) 8 weeks   Risk & Benefits of therapy have been explained Yes   Patient, Family & other staff in agreement with plan of care Yes   Clinical Impression Comments Pt is a pleasant 67 y/o female presenting to PT with R hip and back pain. Upon eval the majority of her pain appears related to the hip. She will benefit from skilled PT to address problems list above and improve function. Pt is a fair PT candidate.    Education Assessment   Preferred Learning Style Listening;Demonstration;Pictures/video   Barriers to Learning No barriers   ORTHO GOALS   PT Ortho Eval Goals 1;2;3;4   Ortho Goal 1   Goal Identifier 1   Goal Description Pt will be able to sit > 1 hour without hip pain   Target Date 05/30/19   Ortho Goal 2   Goal Identifier 2   Goal Description Pt will be able to walk 2 miles with normalized gait pattern without pain   Target Date 05/30/19   Ortho Goal 3   Goal Identifier 3   Goal Description Pt will be able to bend at the hip and pick item off the ground pain free   Target Date 05/30/19   Ortho Goal 4   Goal Identifier 4   Goal  Description Pt will be independent with updated HEP   Target Date 05/30/19   Total Evaluation Time   PT Eval, Low Complexity Minutes (20675) 20     Please Contact me with any questions or concerns. Thank you for for patience and cooperation.     Tank Watkins PT, DPT  Flexible Workforce Physical Therapist   [facility]  [email or web address]

## 2019-04-05 ENCOUNTER — DOCUMENTATION ONLY (OUTPATIENT)
Dept: OTHER | Facility: CLINIC | Age: 69
End: 2019-04-05

## 2019-04-08 ENCOUNTER — TELEPHONE (OUTPATIENT)
Dept: FAMILY MEDICINE | Facility: CLINIC | Age: 69
End: 2019-04-08

## 2019-04-08 ENCOUNTER — HOSPITAL ENCOUNTER (OUTPATIENT)
Dept: PHYSICAL THERAPY | Facility: CLINIC | Age: 69
Setting detail: THERAPIES SERIES
End: 2019-04-08
Attending: FAMILY MEDICINE
Payer: COMMERCIAL

## 2019-04-08 ENCOUNTER — ALLIED HEALTH/NURSE VISIT (OUTPATIENT)
Dept: FAMILY MEDICINE | Facility: CLINIC | Age: 69
End: 2019-04-08
Payer: COMMERCIAL

## 2019-04-08 VITALS — DIASTOLIC BLOOD PRESSURE: 59 MMHG | HEART RATE: 80 BPM | SYSTOLIC BLOOD PRESSURE: 123 MMHG

## 2019-04-08 DIAGNOSIS — I10 HYPERTENSION: Primary | ICD-10-CM

## 2019-04-08 PROCEDURE — 99207 ZZC NO CHARGE NURSE ONLY: CPT

## 2019-04-08 PROCEDURE — 97110 THERAPEUTIC EXERCISES: CPT | Mod: GP | Performed by: PHYSICAL THERAPIST

## 2019-04-08 NOTE — NURSING NOTE
S-(situation): RN blood pressure recheck      B-(background): Last office visit was with Gloria Escobedo on 3/28/19.  Losartan was increased to 75 mg daily.      A-(assessment): Arabella Rodríguez is a 68 year old year old patient who comes in today for a Blood Pressure check because of medication change, losartan increased to 75 mg daily, and ongoing blood pressure monitoring.    Vital Signs as repeated by RN:  140/64, pulse of 72  Home manual monitor is checked for comparison and is 132/62.  Home monitor appears to be reading accurately.  123/59, pulse of 80, 5 min later.    Patient is taking medication as prescribed  Patient is tolerating medications well.  Patient is monitoring Blood Pressure at home.  Home readings are: 130/70, 150/70,  150/70, 120/74, and 120/60.  These home readings were collected from 3/30/19 to 4/8/19.    Current complaints: none      R-(recommendations): Routed to provider for further instructions.  Rechecked reading is at goal.  Evelin Jenkins RN      BP Readings from Last 6 Encounters:   04/08/19 123/59   03/28/19 150/70   03/28/19 125/80   01/18/19 167/73   11/21/18 138/73   10/19/18 138/76     Lab Results   Component Value Date    CR 0.80 03/28/2019     Lab Results   Component Value Date    POTASSIUM 3.7 03/28/2019

## 2019-04-08 NOTE — PROGRESS NOTES
Outpatient Physical Therapy Progress Note     Patient: Arabella Rodríguez  : 1950    Beginning/End Dates of Reporting Period:  2019 to 2019    Referring Provider: Dr. Olsen    Therapy Diagnosis: R hip pain, neck pain     Client Self Report: Pt reports pain in the back of the head on the right that goes down the head into the R side of the neck. As the day persists she has an ache in her right arm (muscle more so that a joint). 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 Pt reports feeling much better today and minimal hip pain. She was able to wear regular dress shoes with a slight heel. She is  walking more.     Objective Measurements:  Objective Measure: Hip ROM  Details: L hip full, R hip flexion 110 slight pain, IR 20 slight pain, ER full no pain  Objective Measure: Cervical and shoulder ROM  Details: Full WNL shoulder ROM without pain, Neck: flexion full slight tightness, extension full, side bending L 25% limited pain in R side of neck and trap   R full,  rotation L tightness in R side of neck 25% limited  R full  Objective Measure: UE MMT  Details: ER 5/5 janet, IR 5/5 janet, shoulder flexion 5/5 janet, shoulder abduction 5/5 janet, elbow flexion 5/5 janet, elbow extension 5/5 janet  Objective Measure: Assessory motion   Details: Shoulder GH, AC, SC all normal no pain, Cervical C1-C7 CPA/side glide unremarkable  Objective Measure: flexibility  Details: very tight R Upper trap and levator scap  Objective Measure: Special tests  Details: relief with cervical distraction, ER lag sign -, HK -, Neer's -    Goals:  Goal Identifier 1   Goal Description Pt will be able to sit > 1 hour without hip pain   Target Date 19   Date Met  (getting much better)   Progress:     Goal Identifier 2   Goal Description Pt will be able to walk 2 miles with normalized gait pattern without pain   Target Date 19   Date Met  (progressing with walking)   Progress:     Goal Identifier 3    Goal Description Pt will be able to bend at the hip and pick item off the ground pain free   Target Date 05/30/19   Date Met      Progress:     Goal Identifier 4   Goal Description Pt will be independent with updated HEP   Target Date 05/30/19   Date Met  (Performs regularly )   Progress:     Goal Identifier 5   Goal Description Pt will be able to check blind spot without pain   Target Date 05/30/19   Date Met      Progress:     Goal Identifier 6   Goal Description Pt will be able to go through full work day without neck pain   Target Date 05/30/19   Date Met      Progress:     Progress Toward Goals:   Progress this reporting period: making progress with hip    Plan:  Added goals for the neck which will now be treated by PT as well    Discharge:  No       04/08/19 1200   Signing Clinician's Name / Credentials   Signing clinician's name / credentials Tank Watkins PT, DPT   Session Number   Session Number 2/8 planned PO   Progress Note/Recertification   Progress Note Due Date 05/04/19   Progress Note Completed Date 04/08/19   Adult Goals   PT Ortho Eval Goals 1;2;3;4;5;6   Ortho Goal 1   Goal Identifier 1   Goal Description Pt will be able to sit > 1 hour without hip pain   Target Date 05/30/19   Date Met   (getting much better)   Ortho Goal 2   Goal Identifier 2   Goal Description Pt will be able to walk 2 miles with normalized gait pattern without pain   Target Date 05/30/19   Date Met   (progressing with walking)   Ortho Goal 3   Goal Identifier 3   Goal Description Pt will be able to bend at the hip and pick item off the ground pain free   Target Date 05/30/19   Ortho Goal 4   Goal Identifier 4   Goal Description Pt will be independent with updated HEP   Target Date 05/30/19   Date Met   (Performs regularly )   Ortho Goal 5   Goal Identifier 5   Goal Description Pt will be able to check blind spot without pain   Target Date 05/30/19   Ortho Goal 6   Goal Identifier 6   Goal Description Pt will be able to go  through full work day without neck pain   Target Date 05/30/19   Subjective Report   Subjective Report Pt reports pain in the back of the head on the right that goes down the head into the R side of the neck. As the day persists she has an ache in her right arm (muscle more so that a joint). 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 Pt reports feeling much better today and minimal hip pain. She was able to wear regular dress shoes with a slight heel. She is  walking more.    Objective Measures   Objective Measures Objective Measure 1;Objective Measure 3;Objective Measure 2;Objective Measure 4;Objective Measure 5;Objective Measure 6   Objective Measure 1   Objective Measure Hip ROM   Details L hip full, R hip flexion 110 slight pain, IR 20 slight pain, ER full no pain   Objective Measure 2   Objective Measure Cervical and shoulder ROM   Details Full WNL shoulder ROM without pain, Neck: flexion full slight tightness, extension full, side bending L 25% limited pain in R side of neck and trap   R full,  rotation L tightness in R side of neck 25% limited  R full   Objective Measure 3   Objective Measure UE MMT   Details ER 5/5 janet, IR 5/5 janet, shoulder flexion 5/5 janet, shoulder abduction 5/5 janet, elbow flexion 5/5 janet, elbow extension 5/5 janet   Objective Measure 4   Objective Measure Assessory motion    Details Shoulder GH, AC, SC all normal no pain, Cervical C1-C7 CPA/side glide unremarkable   Objective Measure 5   Objective Measure flexibility   Details very tight R Upper trap and levator scap   Objective Measure 6   Objective Measure Special tests   Details relief with cervical distraction, ER lag sign -, HK -, Neer's -   Treatment Interventions   Interventions Therapeutic Procedure/Exercise   Therapeutic Procedure/exercise   Therapeutic Procedures: strength, endurance, ROM, flexibillity minutes (04403) 58   Skilled Intervention HEP instruction, stretching, strengthening,  education   Patient Response relief and increasd ROM with neck and hip exercises, verbalizes understanding    Treatment Detail Neck: Instructed in chin tucks x 10 with 5 sec holds, doorway corner stretch 2 x 30 sec, upper trap stretch 2 x 30 sec janet, levator stretch 2 x 30 sec janet, educated on neck anatomy and importance of posture with work activities. HIP and BACK: Added isometric ab holds with pelvic tilt x 20 with 5 sec holds, standing hamstring stretch  x 2 x 30 sec, supine piriformis stretch 2 x 30 sec on the R, Reviewed Bridges 2 x 10 with 5 sec holds, SL hip abduciton x 12 janet with cues to keep the hips stacked and leg back, prone prop position 2 x 1 min holds with relief, standing hip flexor stretch 2 x 1 min holds both ways, wall squats x 20,    Assessments Completed   Assessments Completed Pt is making good progress towards written goals for the hip and was assessed for the neck and arm today. Upon checking the neck her pain appears to be most related to upper trap and levator tightness on the R with some slight nerve root irritation. Pt will benefit from skilled PT to address the neck and new goals were added to address the neck pain.    Education   Learner Patient   Readiness Eager   Method Booklet/handout;Explanation;Demonstration   Response Verbalizes Understanding;Demonstrates Understanding   Plan   Homework HEP above   Home program see above ex   Plan for next session re-check neck, cervical MT/distraction, progress scap and chin tuck strengthening, progress to closed chain hip   Total Session Time   Timed Code Treatment Minutes 58   Total Treatment Time (sum of timed and untimed services) 58, te4   AMBULATORY CLINICS ONLY-MEDICAL AND TREATMENT DIAGNOSIS   PT Diagnosis R hip pain, LBP     Please Contact me with any questions or concerns. Thank you for for patience and cooperation.     Tank Watkins PT, DPT  Flexible Workforce Physical Therapist   [facility]  [email or web address]

## 2019-04-08 NOTE — NURSING NOTE
BP Readings from Last 6 Encounters:   03/28/19 150/70   03/28/19 125/80   01/18/19 167/73   11/21/18 138/73   10/19/18 138/76   09/19/18 156/79     Lab Results   Component Value Date    CR 0.80 03/28/2019     Lab Results   Component Value Date    POTASSIUM 3.7 03/28/2019

## 2019-04-10 DIAGNOSIS — I10 ESSENTIAL HYPERTENSION: ICD-10-CM

## 2019-04-10 RX ORDER — HYDROCHLOROTHIAZIDE 25 MG/1
TABLET ORAL
Qty: 90 TABLET | Refills: 3 | Status: SHIPPED | OUTPATIENT
Start: 2019-04-10 | End: 2020-03-23

## 2019-04-10 NOTE — TELEPHONE ENCOUNTER
BP improved, recommend patient to continue current treatment plan: 75 mg of Cozaar and hydrochlorothiazide  25 mg daily.    THOMAS Butler CNP

## 2019-04-10 NOTE — TELEPHONE ENCOUNTER
"S:  Request for refill of hydrochlorothiazide    B:  LOV 3/28/19  Medication last filled: 3/27/18 for 12 month supply    A:  Requested Prescriptions   Pending Prescriptions Disp Refills     hydrochlorothiazide (HYDRODIURIL) 25 MG tablet [Pharmacy Med Name: HYDROCHLOROTHIAZIDE 25MG TABS] 90 tablet 3     Sig: TAKE ONE TABLET BY MOUTH EVERY DAY       Diuretics (Including Combos) Protocol Passed - 4/10/2019  4:25 PM        Passed - Blood pressure under 140/90 in past 12 months     BP Readings from Last 3 Encounters:   04/08/19 123/59   03/28/19 150/70   03/28/19 125/80                 Passed - Recent (12 mo) or future (30 days) visit within the authorizing provider's specialty     Patient had office visit in the last 12 months or has a visit in the next 30 days with authorizing provider or within the authorizing provider's specialty.  See \"Patient Info\" tab in inbasket, or \"Choose Columns\" in Meds & Orders section of the refill encounter.              Passed - Medication is active on med list        Passed - Patient is age 18 or older        Passed - No active pregancy on record        Passed - Normal serum creatinine on file in past 12 months     Recent Labs   Lab Test 03/28/19  1014   CR 0.80              Passed - Normal serum potassium on file in past 12 months     Recent Labs   Lab Test 03/28/19  1014   POTASSIUM 3.7                    Passed - Normal serum sodium on file in past 12 months     Recent Labs   Lab Test 03/28/19  1014                 Passed - No positive pregnancy test in past 12 months        R:  Filled per [facility] refill protocol.    No further action necessary.  Encounter closed.    Nick Orellana RN    "

## 2019-04-18 ENCOUNTER — HOSPITAL ENCOUNTER (OUTPATIENT)
Dept: PHYSICAL THERAPY | Facility: CLINIC | Age: 69
Setting detail: THERAPIES SERIES
End: 2019-04-18
Attending: FAMILY MEDICINE
Payer: COMMERCIAL

## 2019-04-18 PROCEDURE — 97110 THERAPEUTIC EXERCISES: CPT | Mod: GP | Performed by: PHYSICAL THERAPIST

## 2019-05-10 NOTE — ADDENDUM NOTE
Encounter addended by: Tank Watkins PT on: 5/10/2019 9:04 AM   Actions taken: Sign clinical note, Episode resolved

## 2019-05-10 NOTE — PROGRESS NOTES
Outpatient Physical Therapy Discharge Note     Patient: Arabella Rodríguez  : 1950    Beginning/End Dates of Reporting Period:  2019 to 5/10/2019    Referring Provider: Repa    Therapy Diagnosis: R hip and neck pain     Client Self Report: A little sore today from lifting her grandkids. She feels that the hip and back are feeling much better and that the exercises help. She reports that her pillow affects the neck. Neck exercises are helping.    Goals:  Goal Identifier 1   Goal Description Pt will be able to sit > 1 hour without hip pain   Target Date 19   Date Met  (getting much better)   Progress:     Goal Identifier 2   Goal Description Pt will be able to walk 2 miles with normalized gait pattern without pain   Target Date 19   Date Met  (progressing with walking)   Progress:     Goal Identifier 3   Goal Description Pt will be able to bend at the hip and pick item off the ground pain free   Target Date 19   Date Met      Progress:     Goal Identifier 4   Goal Description Pt will be independent with updated HEP   Target Date 19   Date Met  (Performs regularly )   Progress:     Goal Identifier 5   Goal Description Pt will be able to check blind spot without pain   Target Date 19   Date Met      Progress:     Goal Identifier 6   Goal Description Pt will be able to go through full work day without neck pain   Target Date 19   Date Met      Progress:     Progress Toward Goals:   Progress limited due to Pt was making steady progress, was messaged by patient that they are doing well and they did not schedule and further follow-up visits.     Plan:  Discharge from therapy.    Discharge:    Reason for Discharge: Patient chooses to discontinue therapy.  Patient has failed to schedule further appointments.    Equipment Issued: none    Discharge Plan: Patient to continue home program.    Please Contact me with any questions or concerns. Thank you for for patience and cooperation.      Tank Watkins PT, DPT  Flexible Workforce Physical Therapist   [facility]  [email or web address]

## 2019-07-08 ENCOUNTER — OFFICE VISIT (OUTPATIENT)
Dept: FAMILY MEDICINE | Facility: CLINIC | Age: 69
End: 2019-07-08
Payer: COMMERCIAL

## 2019-07-08 VITALS
OXYGEN SATURATION: 98 % | TEMPERATURE: 97.4 F | WEIGHT: 121.6 LBS | BODY MASS INDEX: 22.38 KG/M2 | DIASTOLIC BLOOD PRESSURE: 72 MMHG | RESPIRATION RATE: 16 BRPM | HEIGHT: 62 IN | HEART RATE: 68 BPM | SYSTOLIC BLOOD PRESSURE: 126 MMHG

## 2019-07-08 DIAGNOSIS — B36.9 FUNGAL SKIN INFECTION: ICD-10-CM

## 2019-07-08 DIAGNOSIS — L24.9 IRRITANT CONTACT DERMATITIS, UNSPECIFIED TRIGGER: Primary | ICD-10-CM

## 2019-07-08 PROCEDURE — 99213 OFFICE O/P EST LOW 20 MIN: CPT | Performed by: NURSE PRACTITIONER

## 2019-07-08 RX ORDER — TRIAMCINOLONE ACETONIDE 1 MG/G
CREAM TOPICAL 2 TIMES DAILY
Qty: 30 G | Refills: 0 | Status: SHIPPED | OUTPATIENT
Start: 2019-07-08

## 2019-07-08 ASSESSMENT — MIFFLIN-ST. JEOR: SCORE: 1034.82

## 2019-07-08 NOTE — PATIENT INSTRUCTIONS
Kenalog cream twice daily for rash on the R hand    Try over the counter Clotrimazole, Lamisil cream for small red rash on the thigh, do not put steroid cream on your leg

## 2019-07-08 NOTE — PROGRESS NOTES
"Subjective     Arabella Rodríguez is a 69 year old female who presents to clinic today for the following health issues:    HPI   Rash  Onset: one month- comes and goes     Description:   Location: left hand   Character: starts out as blisters, then they dry up and get scaley   Itching (Pruritis): YES    Progression of Symptoms:  intermittent    Accompanying Signs & Symptoms: Has a red [place] in her inner thigh of her right leg that itches.  Fever: no   Body aches or joint pain: no   Sore throat symptoms: no   Recent cold symptoms: no     History:   Previous similar rash: no     Precipitating factors:   Exposure to similar rash: no   New exposures: did change body lotions, changed back to her old lotion and it didn't make a difference    Recent travel: no     Alleviating factors:  None     Therapies Tried and outcome: coricedin, bacitracin, valtrex       Reviewed and updated as needed this visit by Provider         Review of Systems   ROS COMP: Constitutional, HEENT, cardiovascular, pulmonary, gi and gu systems are negative, except as otherwise noted.      Objective    /72 (BP Location: Left arm, Patient Position: Sitting, Cuff Size: Adult Regular)   Pulse 68   Temp 97.4  F (36.3  C) (Tympanic)   Resp 16   Ht 1.575 m (5' 2\")   Wt 55.2 kg (121 lb 9.6 oz)   SpO2 98%   BMI 22.24 kg/m    Body mass index is 22.24 kg/m .  Physical Exam   GENERAL: healthy, alert and no distress  SKIN: right palm and index finger mild erythema with scaling, there is small oval patch with erythematous slightly raized borders on the inner side of the R thigh.     Diagnostic Test Results:  Labs reviewed in Epic        Assessment & Plan     1. Irritant contact dermatitis, unspecified trigger    - triamcinolone (KENALOG) 0.1 % external cream; Apply topically 2 times daily  Dispense: 30 g; Refill: 0  -CeraVe cream, or lotion    2. Fungal skin infection  -Clotrimazole cream twice daily for 2 weeks          See Patient " Instructions    Return in about 2 weeks (around 7/22/2019), or if symptoms worsen or fail to improve.    THOMAS Butler CNP  [facility]

## 2019-09-25 ENCOUNTER — MYC MEDICAL ADVICE (OUTPATIENT)
Dept: FAMILY MEDICINE | Facility: CLINIC | Age: 69
End: 2019-09-25

## 2019-09-25 DIAGNOSIS — I10 ESSENTIAL HYPERTENSION: ICD-10-CM

## 2019-09-26 RX ORDER — LOSARTAN POTASSIUM 50 MG/1
75 TABLET ORAL DAILY
Qty: 135 TABLET | Refills: 1 | Status: SHIPPED | OUTPATIENT
Start: 2019-09-26 | End: 2020-03-23

## 2019-09-26 NOTE — TELEPHONE ENCOUNTER
"Requested Prescriptions   Pending Prescriptions Disp Refills     losartan (COZAAR) 50 MG tablet 135 tablet 1     Sig: Take 1.5 tablets (75 mg) by mouth daily       Angiotensin-II Receptors Passed - 9/25/2019 10:55 AM        Passed - Last blood pressure under 140/90 in past 12 months     BP Readings from Last 3 Encounters:   07/08/19 126/72   04/08/19 123/59   03/28/19 150/70           Passed - Recent (12 mo) or future (30 days) visit within the authorizing provider's specialty     Patient had office visit in the last 12 months or has a visit in the next 30 days with authorizing provider or within the authorizing provider's specialty.  See \"Patient Info\" tab in inbasket, or \"Choose Columns\" in Meds & Orders section of the refill encounter.              Passed - Medication is active on med list        Passed - Patient is age 18 or older        Passed - No active pregnancy on record        Passed - Normal serum creatinine on file in past 12 months     Recent Labs   Lab Test 03/28/19  1014   CR 0.80           Passed - Normal serum potassium on file in past 12 months     Recent Labs   Lab Test 03/28/19  1014   POTASSIUM 3.7            Passed - No positive pregnancy test in past 12 months          "

## 2019-10-03 ENCOUNTER — MYC MEDICAL ADVICE (OUTPATIENT)
Dept: FAMILY MEDICINE | Facility: CLINIC | Age: 69
End: 2019-10-03

## 2019-10-16 ENCOUNTER — ANCILLARY PROCEDURE (OUTPATIENT)
Dept: ULTRASOUND IMAGING | Facility: CLINIC | Age: 69
End: 2019-10-16
Payer: COMMERCIAL

## 2019-10-16 DIAGNOSIS — M85.9 LOW BONE DENSITY: ICD-10-CM

## 2019-10-16 DIAGNOSIS — E04.2 MULTIPLE THYROID NODULES: ICD-10-CM

## 2019-10-16 LAB — COPATH REPORT: NORMAL

## 2019-10-16 RX ORDER — LIDOCAINE HYDROCHLORIDE 10 MG/ML
5 INJECTION, SOLUTION INFILTRATION; PERINEURAL ONCE
Status: COMPLETED | OUTPATIENT
Start: 2019-10-16 | End: 2019-10-16

## 2019-10-16 RX ADMIN — LIDOCAINE HYDROCHLORIDE 3 ML: 10 INJECTION, SOLUTION INFILTRATION; PERINEURAL at 10:48

## 2019-10-18 DIAGNOSIS — E04.2 MULTIPLE THYROID NODULES: Primary | ICD-10-CM

## 2019-10-21 ENCOUNTER — TELEPHONE (OUTPATIENT)
Dept: ENDOCRINOLOGY | Facility: CLINIC | Age: 69
End: 2019-10-21

## 2019-10-21 NOTE — TELEPHONE ENCOUNTER
I have a question about the 10/16/19 cytology report on her [id number] .  The specimen was deemed adequate at the time of the procedure yet it was read as virtually acellular, which is a surprise to the patient and also to me.    She says this was not apparent at the time of the procedure to Dr Dean, the performing radiologist, or to her.  This is not reflected in the procedure note  Though it appears the procedure note was dictated on the wrong patient initially.  Is there a possibility that samples were mixed up?    Sherice Estrada MD

## 2019-10-22 NOTE — TELEPHONE ENCOUNTER
From the cyto report, all viewed samples onsite were documented as inadequate.  The sample was then signout as nondiagnostic.  Things seem to correlate to me.     JS    Routing comment        I am copying your response to the record since it shows up as invisible in the format you sent it.  Yes, I see that the report calls it inadequate but I thought that was your reading and the reading from during the procedure was possible different?  The patient was not aware of inadequacy at the time of the procedure and the procedure report suggests the radiologist was also not aware of this?     Sherice Estrada

## 2019-10-23 ENCOUNTER — OFFICE VISIT (OUTPATIENT)
Dept: DERMATOLOGY | Facility: CLINIC | Age: 69
End: 2019-10-23
Payer: COMMERCIAL

## 2019-10-23 VITALS — BODY MASS INDEX: 22.24 KG/M2 | HEIGHT: 62 IN | OXYGEN SATURATION: 99 % | HEART RATE: 75 BPM

## 2019-10-23 DIAGNOSIS — L82.1 SEBORRHEIC KERATOSIS: ICD-10-CM

## 2019-10-23 DIAGNOSIS — D18.01 ANGIOMA OF SKIN: ICD-10-CM

## 2019-10-23 DIAGNOSIS — L81.4 LENTIGO: Primary | ICD-10-CM

## 2019-10-23 DIAGNOSIS — D23.9 DERMAL NEVUS: ICD-10-CM

## 2019-10-23 PROCEDURE — 99213 OFFICE O/P EST LOW 20 MIN: CPT | Performed by: DERMATOLOGY

## 2019-10-23 NOTE — PROGRESS NOTES
Arabella Rodríguez is a 69 year old year old female patient here today for brown spots on skin.   .  Patient states this has been present for a while.  Patient reports the following symptoms:  none.  Patient reports the following previous treatments none.  Patient reports the following modifying factors none.  Associated symptoms: none.  Patient has no other skin complaints today.  Remainder of the HPI, Meds, PMH, Allergies, FH, and SH was reviewed in chart.      Past Medical History:   Diagnosis Date     Hypertension      Osteopenia      Thyroid nodules [facility]     benign        Past Surgical History:   Procedure Laterality Date     ABDOMEN SURGERY  1986         BIOPSY  2014    Thyroid     C ANESTH, SECTION       COLONOSCOPY       COLONOSCOPY N/A 2018    Procedure: COLONOSCOPY;  colonoscopy;  Surgeon: Xu Feliciano MD;  Location: [facility]     EXCISE MASS WRIST Left 2016    Procedure: EXCISE MASS WRIST;  Surgeon: Germain Hull MD;  Location: [facility]     TONSILLECTOMY          Family History   Problem Relation Age of Onset     Lipids Mother      Arthritis Mother      Hypertension Mother      Hyperlipidemia Mother      Osteopenia Mother      Hypertension Father      Hyperlipidemia Father      Prostate Cancer Father      Hypertension Paternal Grandfather      Neurologic Disorder Brother         brain tumor     Other Cancer Brother          Brain Surgery/ Brain Surgery     Depression Sister         early      Depression Niece      Diabetes No family hx of      Thyroid Cancer No family hx of      Thyroid Disease No family hx of        Social History     Socioeconomic History     Marital status:      Spouse name: Not on file     Number of children: Not on file     Years of education: Not on file     Highest education level: Not on file   Occupational History     Employer: [employer]   Social Needs     Financial resource strain: Not  on file     Food insecurity:     Worry: Not on file     Inability: Not on file     Transportation needs:     Medical: Not on file     Non-medical: Not on file   Tobacco Use     Smoking status: Former Smoker     Packs/day: 0.50     Years: 9.00     Pack years: 4.50     Types: Cigarettes     Start date: 10/1/1972     Last attempt to quit: 1981     Years since quittin.5     Smokeless tobacco: Never Used     Tobacco comment: I quit in    Substance and Sexual Activity     Alcohol use: Yes     Comment: 1 o 2 then switch  to na     Drug use: No     Sexual activity: Not Currently     Partners: Male   Lifestyle     Physical activity:     Days per week: Not on file     Minutes per session: Not on file     Stress: Not on file   Relationships     Social connections:     Talks on phone: Not on file     Gets together: Not on file     Attends Hindu service: Not on file     Active member of club or organization: Not on file     Attends meetings of clubs or organizations: Not on file     Relationship status: Not on file     Intimate partner violence:     Fear of current or ex partner: Not on file     Emotionally abused: Not on file     Physically abused: Not on file     Forced sexual activity: Not on file   Other Topics Concern     Parent/sibling w/ CABG, MI or angioplasty before 65F 55M? No      Service No     Blood Transfusions No     Caffeine Concern Yes     Comment: 3 cups cofffee a day     Occupational Exposure No     Hobby Hazards No     Sleep Concern No     Stress Concern No     Weight Concern No     Special Diet Yes     Comment: calcium with D one a day     Back Care No     Exercise Yes     Comment: couple times a wk     Bike Helmet No     Seat Belt Yes     Self-Exams No   Social History Narrative     Not on file       Outpatient Encounter Medications as of 10/23/2019   Medication Sig Dispense Refill     CALCIUM + D OR 1 TABLET ORALLY DAILY       hydrochlorothiazide (HYDRODIURIL) 25 MG tablet TAKE ONE  "TABLET BY MOUTH EVERY DAY 90 tablet 3     losartan (COZAAR) 50 MG tablet Take 1.5 tablets (75 mg) by mouth daily 135 tablet 1     MULTI-VITAMIN OR 1 TABLET ORALLY DAILY       Omega-3 Fatty Acids ([employer] FISH OIL) 1200 MG CPDR Take 1 capsule by mouth daily        triamcinolone (KENALOG) 0.1 % external cream Apply topically 2 times daily 30 g 0     valACYclovir (VALTREX) 1000 mg tablet If you have a cold sore use 2 tab po bid for 2 days.  If you have the rash on your leg, 0.5 po twice for 3 days. Hold on file until needed 30 tablet 3     No facility-administered encounter medications on file as of 10/23/2019.              Review Of Systems  Skin: As above  Eyes: negative  Ears/Nose/Throat: negative  Respiratory: No shortness of breath, dyspnea on exertion, cough, or hemoptysis  Cardiovascular: negative  Gastrointestinal: negative  Genitourinary: negative  Musculoskeletal: negative  Neurologic: negative  Psychiatric: negative  Hematologic/Lymphatic/Immunologic: negative  Endocrine: negative      O:   NAD, WDWN, Alert & Oriented, Mood & Affect wnl, Vitals stable   Here today alone   Pulse 75   Ht 1.575 m (5' 2\")   SpO2 99%   BMI 22.24 kg/m     General appearance normal   Vitals stable   Alert, oriented and in no acute distress      Following lymph nodes palpated: Occipital, Cervical, Supraclavicular no lad      Stuck on papules and brown macules on trunk and ext   Red papules on trunk  Flesh colored papules on trunk     The remainder of the full exam was unremarkable; the following areas were examined:  conjunctiva/lids, oral mucosa, neck, peripheral vascular system, abdomen, lymph nodes, digits/nails, eccrine and apocrine glands, scalp/hair, face, neck, chest, abdomen, buttocks, back, RUE, LUE, RLE, LLE       Eyes: Conjunctivae/lids:Normal     ENT: Lips, buccal mucosa, tongue: normal    MSK:Normal    Cardiovascular: peripheral edema none    Pulm: Breathing Normal    Lymph Nodes: No Head and Neck Lymphadenopathy "     Neuro/Psych: Orientation:Normal; Mood/Affect:Normal      A/P:  1. Seborrheic keratosis, lentigo, angioma, dermal nevus  BENIGN LESIONS DISCUSSED WITH PATIENT:  I discussed the specifics of tumor, prognosis, and genetics of benign lesions.  I explained that treatment of these lesions would be purely cosmetic and not medically neccessary.  I discussed with patient different removal options including excision, cautery and /or laser.      Nature and genetics of benign skin lesions dicussed with patient.  Signs and Symptoms of skin cancer discussed with patient.  Patient encouraged to perform monthly skin exams.  UV precautions reviewed with patient.  Skin care regimen reviewed with patient: Eliminate harsh soaps, i.e. Dial, zest, irsih spring; Mild soaps such as Cetaphil or Dove sensitive skin, avoid hot or cold showers, aggressive use of emollients including vanicream, cetaphil or cerave discussed with patient.    Risks of non-melanoma skin cancer discussed with patient   Return to clinic 12 months

## 2019-10-23 NOTE — NURSING NOTE
"Chief Complaint   Patient presents with     Skin Check       Vitals:    10/23/19 1056   Pulse: 75   SpO2: 99%   Height: 1.575 m (5' 2\")     Wt Readings from Last 1 Encounters:   07/08/19 55.2 kg (121 lb 9.6 oz)       Roxy Rosen LPN.................10/23/2019    "

## 2019-10-23 NOTE — LETTER
10/23/2019         RE: Arabella Rodríguez  [address] [place]  [facility] [address]        Dear Colleague,    Thank you for referring your patient, Arabella Rodríguez, to the [facility]. Please see a copy of my visit note below.    Arabella Rodríguez is a 69 year old year old female patient here today for brown spots on skin.   .  Patient states this has been present for a while.  Patient reports the following symptoms:  none.  Patient reports the following previous treatments none.  Patient reports the following modifying factors none.  Associated symptoms: none.  Patient has no other skin complaints today.  Remainder of the HPI, Meds, PMH, Allergies, FH, and SH was reviewed in chart.      Past Medical History:   Diagnosis Date     Hypertension      Osteopenia      Thyroid nodules [facility]     benign        Past Surgical History:   Procedure Laterality Date     ABDOMEN SURGERY  1986         BIOPSY  2014    Thyroid     C ANESTH, SECTION       COLONOSCOPY       COLONOSCOPY N/A 2018    Procedure: COLONOSCOPY;  colonoscopy;  Surgeon: Xu Feliciano MD;  Location: [facility]     EXCISE MASS WRIST Left 2016    Procedure: EXCISE MASS WRIST;  Surgeon: Germain Hull MD;  Location: [facility]     TONSILLECTOMY          Family History   Problem Relation Age of Onset     Lipids Mother      Arthritis Mother      Hypertension Mother      Hyperlipidemia Mother      Osteopenia Mother      Hypertension Father      Hyperlipidemia Father      Prostate Cancer Father      Hypertension Paternal Grandfather      Neurologic Disorder Brother         brain tumor     Other Cancer Brother          Brain Surgery/ Brain Surgery     Depression Sister         early      Depression Niece      Diabetes No family hx of      Thyroid Cancer No family hx of      Thyroid Disease No family hx of        Social History     Socioeconomic History     Marital status:       Discussed injury prevention, diet and exercise, safe sexual practices, and screening for common diseases. Encouraged use of sunscreen and seatbelts. Avoidance of tobacco encouraged. Limitation or avoidance of alcohol encouraged. Recommend yearly dental and eye exams. Also discussed monitoring of blood pressure, lipids.  Vaccines were not given.  Will need to followup with Dr. Austin's office.     Spouse name: Not on file     Number of children: Not on file     Years of education: Not on file     Highest education level: Not on file   Occupational History     Employer: [employer]   Social Needs     Financial resource strain: Not on file     Food insecurity:     Worry: Not on file     Inability: Not on file     Transportation needs:     Medical: Not on file     Non-medical: Not on file   Tobacco Use     Smoking status: Former Smoker     Packs/day: 0.50     Years: 9.00     Pack years: 4.50     Types: Cigarettes     Start date: 10/1/1972     Last attempt to quit: 1981     Years since quittin.5     Smokeless tobacco: Never Used     Tobacco comment: I quit in    Substance and Sexual Activity     Alcohol use: Yes     Comment: 1 o 2 then switch  to na     Drug use: No     Sexual activity: Not Currently     Partners: Male   Lifestyle     Physical activity:     Days per week: Not on file     Minutes per session: Not on file     Stress: Not on file   Relationships     Social connections:     Talks on phone: Not on file     Gets together: Not on file     Attends Muslim service: Not on file     Active member of club or organization: Not on file     Attends meetings of clubs or organizations: Not on file     Relationship status: Not on file     Intimate partner violence:     Fear of current or ex partner: Not on file     Emotionally abused: Not on file     Physically abused: Not on file     Forced sexual activity: Not on file   Other Topics Concern     Parent/sibling w/ CABG, MI or angioplasty before 65F 55M? No      Service No     Blood Transfusions No     Caffeine Concern Yes     Comment: 3 cups cofffee a day     Occupational Exposure No     Hobby Hazards No     Sleep Concern No     Stress Concern No     Weight Concern No     Special Diet Yes     Comment: calcium with D one a day     Back Care No     Exercise Yes     Comment: couple times a wk     Bike Helmet No     Seat Belt Yes  "    Self-Exams No   Social History Narrative     Not on file       Outpatient Encounter Medications as of 10/23/2019   Medication Sig Dispense Refill     CALCIUM + D OR 1 TABLET ORALLY DAILY       hydrochlorothiazide (HYDRODIURIL) 25 MG tablet TAKE ONE TABLET BY MOUTH EVERY DAY 90 tablet 3     losartan (COZAAR) 50 MG tablet Take 1.5 tablets (75 mg) by mouth daily 135 tablet 1     MULTI-VITAMIN OR 1 TABLET ORALLY DAILY       Omega-3 Fatty Acids ([employer] FISH OIL) 1200 MG CPDR Take 1 capsule by mouth daily        triamcinolone (KENALOG) 0.1 % external cream Apply topically 2 times daily 30 g 0     valACYclovir (VALTREX) 1000 mg tablet If you have a cold sore use 2 tab po bid for 2 days.  If you have the rash on your leg, 0.5 po twice for 3 days. Hold on file until needed 30 tablet 3     No facility-administered encounter medications on file as of 10/23/2019.              Review Of Systems  Skin: As above  Eyes: negative  Ears/Nose/Throat: negative  Respiratory: No shortness of breath, dyspnea on exertion, cough, or hemoptysis  Cardiovascular: negative  Gastrointestinal: negative  Genitourinary: negative  Musculoskeletal: negative  Neurologic: negative  Psychiatric: negative  Hematologic/Lymphatic/Immunologic: negative  Endocrine: negative      O:   NAD, WDWN, Alert & Oriented, Mood & Affect wnl, Vitals stable   Here today alone   Pulse 75   Ht 1.575 m (5' 2\")   SpO2 99%   BMI 22.24 kg/m      General appearance normal   Vitals stable   Alert, oriented and in no acute distress      Following lymph nodes palpated: Occipital, Cervical, Supraclavicular no lad      Stuck on papules and brown macules on trunk and ext   Red papules on trunk  Flesh colored papules on trunk     The remainder of the full exam was unremarkable; the following areas were examined:  conjunctiva/lids, oral mucosa, neck, peripheral vascular system, abdomen, lymph nodes, digits/nails, eccrine and apocrine glands, scalp/hair, face, neck, chest, abdomen, " buttocks, back, RUE, LUE, RLE, LLE       Eyes: Conjunctivae/lids:Normal     ENT: Lips, buccal mucosa, tongue: normal    MSK:Normal    Cardiovascular: peripheral edema none    Pulm: Breathing Normal    Lymph Nodes: No Head and Neck Lymphadenopathy     Neuro/Psych: Orientation:Normal; Mood/Affect:Normal      A/P:  1. Seborrheic keratosis, lentigo, angioma, dermal nevus  BENIGN LESIONS DISCUSSED WITH PATIENT:  I discussed the specifics of tumor, prognosis, and genetics of benign lesions.  I explained that treatment of these lesions would be purely cosmetic and not medically neccessary.  I discussed with patient different removal options including excision, cautery and /or laser.      Nature and genetics of benign skin lesions dicussed with patient.  Signs and Symptoms of skin cancer discussed with patient.  Patient encouraged to perform monthly skin exams.  UV precautions reviewed with patient.  Skin care regimen reviewed with patient: Eliminate harsh soaps, i.e. Dial, zest, irsih spring; Mild soaps such as Cetaphil or Dove sensitive skin, avoid hot or cold showers, aggressive use of emollients including vanicream, cetaphil or cerave discussed with patient.    Risks of non-melanoma skin cancer discussed with patient   Return to clinic 12 months      Again, thank you for allowing me to participate in the care of your patient.        Sincerely,        Elkin Hooks MD

## 2019-10-31 ENCOUNTER — TELEPHONE (OUTPATIENT)
Dept: ENDOCRINOLOGY | Facility: CLINIC | Age: 69
End: 2019-10-31

## 2019-10-31 NOTE — TELEPHONE ENCOUNTER
Addy Travis III, MD  Cc: Sherice Estrada MD [facility]   Caller: Unspecified (1 week ago)             I spoke with the cytotech in the room and all inadequate readings were relayed to the radiologist.  I see that the full 6 passes were performed which indicates that they were attempting the maximum number of passess to provide adequacy.  The techs communicate to the radiologist what they are told by the pathologists via telecytopath.  I am told inadequate was communicated and this is what was on the pathology report.     The cytotech does not know what was subsequently communicated to the patient.   Regards,   Addy Mora III, MD  Cc: Sherice Estrada MD [facility]   Caller: Unspecified (1 week ago)             I

## 2019-11-09 ENCOUNTER — HEALTH MAINTENANCE LETTER (OUTPATIENT)
Age: 69
End: 2019-11-09

## 2019-12-16 DIAGNOSIS — B00.1 COLD SORE: ICD-10-CM

## 2019-12-16 NOTE — TELEPHONE ENCOUNTER
"Requested Prescriptions   Pending Prescriptions Disp Refills     valACYclovir (VALTREX) 1000 mg tablet 30 tablet 3     Sig: If you have a cold sore use 2 tab po bid for 2 days.  If you have the rash on your leg, 0.5 po twice for 3 days. Hold on file until needed   Last Written Prescription Date:  3/27/18  Last Fill Quantity: 30 tab,  # refills: 3   Last office visit: 7/8/2019 with prescribing provider:  Gloria Escobedo     Future Office Visit:        Antivirals for Herpes Protocol Passed - 12/16/2019  3:03 PM        Passed - Patient is age 12 or older        Passed - Recent (12 mo) or future (30 days) visit within the authorizing provider's specialty     Patient has had an office visit with the authorizing provider or a provider within the authorizing providers department within the previous 12 mos or has a future within next 30 days. See \"Patient Info\" tab in inbasket, or \"Choose Columns\" in Meds & Orders section of the refill encounter.              Passed - Medication is active on med list        Passed - Normal serum creatinine on file in past 12 months     Recent Labs   Lab Test 03/28/19  1014   CR 0.80               "

## 2019-12-18 RX ORDER — VALACYCLOVIR HYDROCHLORIDE 1 G/1
TABLET, FILM COATED ORAL
Qty: 30 TABLET | Refills: 1 | Status: SHIPPED | OUTPATIENT
Start: 2019-12-18 | End: 2021-05-13

## 2020-03-21 DIAGNOSIS — I10 ESSENTIAL HYPERTENSION: ICD-10-CM

## 2020-03-23 RX ORDER — HYDROCHLOROTHIAZIDE 25 MG/1
TABLET ORAL
Qty: 90 TABLET | Refills: 0 | Status: SHIPPED | OUTPATIENT
Start: 2020-03-23 | End: 2020-07-01

## 2020-03-23 RX ORDER — LOSARTAN POTASSIUM 50 MG/1
TABLET ORAL
Qty: 135 TABLET | Refills: 0 | Status: SHIPPED | OUTPATIENT
Start: 2020-03-23 | End: 2020-07-01

## 2020-06-28 DIAGNOSIS — I10 ESSENTIAL HYPERTENSION: ICD-10-CM

## 2020-06-30 NOTE — TELEPHONE ENCOUNTER
Routing refill request to provider for review/approval because:  Due for clinic visit and labs.     LOV 07/08/19 with Gloria.      Shannan BOOTH BSN, RN

## 2020-06-30 NOTE — TELEPHONE ENCOUNTER
Routing refill request to provider for review/approval because:  Labs not current    Creatinine   Date Value Ref Range Status   03/28/2019 0.80 0.52 - 1.04 mg/dL Final     Potassium   Date Value Ref Range Status   03/28/2019 3.7 3.4 - 5.3 mmol/L Final     Dayan JOSE RN, BSN

## 2020-07-01 RX ORDER — LOSARTAN POTASSIUM 50 MG/1
75 TABLET ORAL DAILY
Qty: 135 TABLET | Refills: 0 | Status: SHIPPED | OUTPATIENT
Start: 2020-07-01 | End: 2020-09-30

## 2020-07-01 RX ORDER — HYDROCHLOROTHIAZIDE 25 MG/1
TABLET ORAL
Qty: 90 TABLET | Refills: 0 | Status: SHIPPED | OUTPATIENT
Start: 2020-07-01 | End: 2020-09-30

## 2020-07-01 NOTE — TELEPHONE ENCOUNTER
Refilled, recommend follow up for future refills: fasting appt for physical and BP    THOMAS Butler CNP

## 2020-07-23 ENCOUNTER — TELEPHONE (OUTPATIENT)
Dept: FAMILY MEDICINE | Facility: CLINIC | Age: 70
End: 2020-07-23

## 2020-07-23 NOTE — TELEPHONE ENCOUNTER
Reason for call:    Symptom or request:     patient called stating that she has an apt schedule for 08/21 requesting to have lab work done before visit.         Best Time:  any    Can we leave a detailed message on this number?  YES     Briana HERNANDEZ  Station

## 2020-07-24 ENCOUNTER — HOSPITAL ENCOUNTER (OUTPATIENT)
Dept: ULTRASOUND IMAGING | Facility: CLINIC | Age: 70
Discharge: HOME | End: 2020-07-24
Payer: COMMERCIAL

## 2020-07-24 DIAGNOSIS — E78.5 HYPERLIPIDEMIA LDL GOAL <130: Primary | ICD-10-CM

## 2020-07-24 DIAGNOSIS — E04.2 MULTIPLE THYROID NODULES: ICD-10-CM

## 2020-07-24 DIAGNOSIS — I10 ESSENTIAL HYPERTENSION: ICD-10-CM

## 2020-07-24 PROCEDURE — 76536 US EXAM OF HEAD AND NECK: CPT

## 2020-07-27 ENCOUNTER — MYC MEDICAL ADVICE (OUTPATIENT)
Dept: FAMILY MEDICINE | Facility: CLINIC | Age: 70
End: 2020-07-27

## 2020-07-27 DIAGNOSIS — E04.2 MULTIPLE THYROID NODULES: Primary | ICD-10-CM

## 2020-08-12 DIAGNOSIS — I10 ESSENTIAL HYPERTENSION: ICD-10-CM

## 2020-08-12 DIAGNOSIS — E78.5 HYPERLIPIDEMIA LDL GOAL <130: ICD-10-CM

## 2020-08-12 DIAGNOSIS — E04.2 MULTIPLE THYROID NODULES: ICD-10-CM

## 2020-08-12 LAB
ALBUMIN SERPL-MCNC: 4.1 G/DL (ref 3.4–5)
ALP SERPL-CCNC: 51 U/L (ref 40–150)
ALT SERPL W P-5'-P-CCNC: 28 U/L (ref 0–50)
ANION GAP SERPL CALCULATED.3IONS-SCNC: 3 MMOL/L (ref 3–14)
AST SERPL W P-5'-P-CCNC: 24 U/L (ref 0–45)
BILIRUB SERPL-MCNC: 0.6 MG/DL (ref 0.2–1.3)
BUN SERPL-MCNC: 15 MG/DL (ref 7–30)
CALCIUM SERPL-MCNC: 9.2 MG/DL (ref 8.5–10.1)
CHLORIDE SERPL-SCNC: 98 MMOL/L (ref 94–109)
CHOLEST SERPL-MCNC: 256 MG/DL
CO2 SERPL-SCNC: 30 MMOL/L (ref 20–32)
CREAT SERPL-MCNC: 0.77 MG/DL (ref 0.52–1.04)
GFR SERPL CREATININE-BSD FRML MDRD: 78 ML/MIN/{1.73_M2}
GLUCOSE SERPL-MCNC: 101 MG/DL (ref 70–99)
HDLC SERPL-MCNC: 72 MG/DL
LDLC SERPL CALC-MCNC: 164 MG/DL
NONHDLC SERPL-MCNC: 184 MG/DL
POTASSIUM SERPL-SCNC: 3.8 MMOL/L (ref 3.4–5.3)
PROT SERPL-MCNC: 7.2 G/DL (ref 6.8–8.8)
SODIUM SERPL-SCNC: 131 MMOL/L (ref 133–144)
TRIGL SERPL-MCNC: 99 MG/DL
TSH SERPL DL<=0.005 MIU/L-ACNC: 3.52 MU/L (ref 0.4–4)

## 2020-08-12 PROCEDURE — 36415 COLL VENOUS BLD VENIPUNCTURE: CPT | Performed by: NURSE PRACTITIONER

## 2020-08-12 PROCEDURE — 84443 ASSAY THYROID STIM HORMONE: CPT | Performed by: NURSE PRACTITIONER

## 2020-08-12 PROCEDURE — 80061 LIPID PANEL: CPT | Performed by: NURSE PRACTITIONER

## 2020-08-12 PROCEDURE — 80053 COMPREHEN METABOLIC PANEL: CPT | Performed by: NURSE PRACTITIONER

## 2020-08-13 ENCOUNTER — TELEPHONE (OUTPATIENT)
Dept: FAMILY MEDICINE | Facility: CLINIC | Age: 70
End: 2020-08-13

## 2020-08-13 NOTE — TELEPHONE ENCOUNTER
She has 4 thyroid nodules, two have increased slightly in size, and one has decreased slightly in size, one did not change. No biopsy currently needed, but she will need to have follow up thyroid US in 1 year.    Please send this to Dr. Estrada in case if she has other recommendations to the patient.    THOMAS Butler CNP

## 2020-08-13 NOTE — TELEPHONE ENCOUNTER
Patient had US thyroid 7-24-20 ordered by Dr. Sean Borges.  She has failed in reaching them for her results.  Would you be able to tell her?    US Results:  IMPRESSION: Four thyroid nodules are described above, two of which have increased slightly in size, and one of which has decreased slightly in size.    Nodule 1: Hypoechoic solid nodule in the inferior right thyroid lobe has decreased slightly in size, measuring 1.2 x 1 x 1.3 cm (previously 1.5 x 1.1 x 1.3 cm).   Composition: Solid or almost completely solid, 2 points   Echogenicity: Hypoechoic, 2 points   Shape: Wider-than-tall, 0 points   Margin: Ill-defined, 0 points   Echogenic Foci: None, or large comet-tail artifacts, 0 points   Point Total: 4-6 points. TI-RADS 4. If 1.5 cm or larger, recommend  FNA; if 1 cm or larger, follow up US (annually for 5 years).      Nodule 2: Hypoechoic solid nodule in the mid right thyroid lobe is not significantly changed, measuring 0.7 x 0.7 x 0.6 cm.  Composition: Solid or almost completely solid, 2 points   Echogenicity: Hypoechoic, 2 points   Shape: Wider-than-tall, 0 points   Margin: Smooth, 0 points   Echogenic Foci: None, or large comet-tail artifacts, 0 points   Point Total: 4-6 points. TI-RADS 4. If 1.5 cm or larger, recommend  FNA; if 1 cm or larger, follow up US (annually for 5 years).     Nodule 3: Complex cystic and solid nodule in the inferior right thyroid lobe has increased slightly in size, measuring 2.4 x 1.1 x 2.4 cm (previously 2 x 0.6 x 1.6 cm).   Composition: Mixed cystic and solid, 1 point   Echogenicity: Unable to determine, 1 point   Shape: Wider-than-tall, 0 points   Margin: Smooth, 0 points   Echogenic Foci: None, or large comet-tail artifacts, 0 points   Point Total: 1-2 points. TI-RADS 2. No FNA.     Nodule 4: Hypoechoic solid nodule in the inferior left thyroid lobe has increased slightly in size, measuring 1.1 x 0.5 x 0.8 cm (previously 0.6 x 0.3 x 0.6 cm).  Composition: Solid or almost  completely solid, 2 points   Echogenicity: Hypoechoic, 2 points   Shape: Wider-than-tall, 0 points   Margin: Smooth, 0 points   Echogenic Foci: None, or large comet-tail artifacts, 0 points   Point Total: 4-6 points. TI-RADS 4. If 1.5 cm or larger, recommend  FNA; if 1 cm or larger, follow up US (annually for 5 years).

## 2020-08-13 NOTE — TELEPHONE ENCOUNTER
Reason for Call:  Request for results:    Name of test or procedure: Thyroid US - Pt calling for results.   She states that she cannot get a hold on [facility]. It's been several weeks and is she is asking if Gloria CASTRO can read the test and give her results?  Please call patient and advise.      Date of test of procedure: 7/24    Location of the test or procedure: [facility]    OK to leave the result message on voice mail or with a family member? YES    Phone number Patient can be reached at:  Home number on file [phone] (home)    Additional comments:     Call taken on 8/13/2020 at 2:02 PM by Aditi Reyes

## 2020-08-14 NOTE — TELEPHONE ENCOUNTER
I last saw her in 2018.  IF I actually ordered the US in question I am sure my intention is she would have a follow up appt to discuss the result.  Sherice Estrada MD

## 2020-08-14 NOTE — TELEPHONE ENCOUNTER
Gloria,    According to the chart, Dr. Estrada's name is attached to order?  RN will call patient to have her schedule appt with Endo for f/u.    Karen SALAS RN BSN

## 2020-08-20 ASSESSMENT — ENCOUNTER SYMPTOMS
BREAST MASS: 0
SHORTNESS OF BREATH: 0
NERVOUS/ANXIOUS: 0
CONSTIPATION: 0
DYSURIA: 0
CHILLS: 0
HEADACHES: 0
NAUSEA: 0
DIZZINESS: 0
HEARTBURN: 0
MYALGIAS: 0
SORE THROAT: 0
ARTHRALGIAS: 0
ABDOMINAL PAIN: 0
WEAKNESS: 0
FREQUENCY: 0
HEMATURIA: 0
PALPITATIONS: 0
FEVER: 0
EYE PAIN: 0
PARESTHESIAS: 0
COUGH: 0
DIARRHEA: 0
HEMATOCHEZIA: 0
JOINT SWELLING: 0

## 2020-08-20 ASSESSMENT — ACTIVITIES OF DAILY LIVING (ADL): CURRENT_FUNCTION: NO ASSISTANCE NEEDED

## 2020-08-21 ENCOUNTER — OFFICE VISIT (OUTPATIENT)
Dept: FAMILY MEDICINE | Facility: CLINIC | Age: 70
End: 2020-08-21
Payer: COMMERCIAL

## 2020-08-21 ENCOUNTER — HOSPITAL ENCOUNTER (OUTPATIENT)
Dept: MAMMOGRAPHY | Facility: CLINIC | Age: 70
Discharge: HOME | End: 2020-08-21
Attending: NURSE PRACTITIONER | Admitting: NURSE PRACTITIONER
Payer: COMMERCIAL

## 2020-08-21 VITALS
BODY MASS INDEX: 21.71 KG/M2 | RESPIRATION RATE: 16 BRPM | HEIGHT: 62 IN | TEMPERATURE: 97.3 F | HEART RATE: 80 BPM | SYSTOLIC BLOOD PRESSURE: 138 MMHG | OXYGEN SATURATION: 99 % | WEIGHT: 118 LBS | DIASTOLIC BLOOD PRESSURE: 66 MMHG

## 2020-08-21 DIAGNOSIS — Z12.31 VISIT FOR SCREENING MAMMOGRAM: ICD-10-CM

## 2020-08-21 DIAGNOSIS — M85.80 OSTEOPENIA, UNSPECIFIED LOCATION: ICD-10-CM

## 2020-08-21 DIAGNOSIS — Z00.00 ANNUAL PHYSICAL EXAM: Primary | ICD-10-CM

## 2020-08-21 DIAGNOSIS — E04.2 MULTIPLE THYROID NODULES: ICD-10-CM

## 2020-08-21 PROCEDURE — 99397 PER PM REEVAL EST PAT 65+ YR: CPT | Performed by: NURSE PRACTITIONER

## 2020-08-21 PROCEDURE — 77067 SCR MAMMO BI INCL CAD: CPT

## 2020-08-21 RX ORDER — FLUTICASONE PROPIONATE 50 MCG
1 SPRAY, SUSPENSION (ML) NASAL DAILY
COMMUNITY
End: 2021-09-30

## 2020-08-21 ASSESSMENT — ENCOUNTER SYMPTOMS
PARESTHESIAS: 0
HEADACHES: 0
DIZZINESS: 0
DIARRHEA: 0
EYE PAIN: 0
PALPITATIONS: 0
NERVOUS/ANXIOUS: 0
CONSTIPATION: 0
JOINT SWELLING: 0
BREAST MASS: 0
ARTHRALGIAS: 0
COUGH: 0
DYSURIA: 0
SHORTNESS OF BREATH: 0
ABDOMINAL PAIN: 0
HEMATOCHEZIA: 0
CHILLS: 0
MYALGIAS: 0
WEAKNESS: 0
HEARTBURN: 0
SORE THROAT: 0
HEMATURIA: 0
FEVER: 0
FREQUENCY: 0
NAUSEA: 0

## 2020-08-21 ASSESSMENT — ACTIVITIES OF DAILY LIVING (ADL): CURRENT_FUNCTION: NO ASSISTANCE NEEDED

## 2020-08-21 ASSESSMENT — MIFFLIN-ST. JEOR: SCORE: 1005.55

## 2020-08-21 NOTE — PROGRESS NOTES
"SUBJECTIVE:   Arabella Rodríguez is a 70 year old female who presents for Preventive Visit.    Are you in the first 12 months of your Medicare coverage?  No    Healthy Habits:     In general, how would you rate your overall health?  Good    Frequency of exercise:  2-3 days/week    Duration of exercise:  15-30 minutes    Do you usually eat at least 4 servings of fruit and vegetables a day, include whole grains    & fiber and avoid regularly eating high fat or \"junk\" foods?  Yes    Taking medications regularly:  Yes    Medication side effects:  Other    Ability to successfully perform activities of daily living:  No assistance needed    Home Safety:  No safety concerns identified    Hearing Impairment:  No hearing concerns    In the past 6 months, have you been bothered by leaking of urine?  No    In general, how would you rate your overall mental or emotional health?  Excellent      PHQ-2 Total Score: 0    Additional concerns today:  Yes (questions about dexa scans and questions about medications)  Would like to discuss Flonase and calcium tablet (slow release)     Do you feel safe in your environment? Yes    Have you ever done Advance Care Planning? (For example, a Health Directive, POLST, or a discussion with a medical provider or your loved ones about your wishes): Yes, advance care planning is on file.      Fall risk  Fallen 2 or more times in the past year?: No  Any fall with injury in the past year?: No  Cognitive Screening   1) Repeat 3 items (Leader, Season, Table)    2) Clock draw: NORMAL  3) 3 item recall: Recalls 3 objects  Results: 3 items recalled: COGNITIVE IMPAIRMENT LESS LIKELY    Mini-CogTM Copyright ELSI Blake. Licensed by the author for use in [facility]; reprinted with permission (patrick@.[facility]). All rights reserved.      Do you have sleep apnea, excessive snoring or daytime drowsiness?: no    Reviewed and updated as needed this visit by clinical staff  Tobacco  Allergies  Meds  Med Hx "  Surg Hx  Fam Hx  Soc Hx        Reviewed and updated as needed this visit by Provider        Social History     Tobacco Use     Smoking status: Former Smoker     Packs/day: 0.50     Years: 9.00     Pack years: 4.50     Types: Cigarettes     Start date: 10/1/1972     Last attempt to quit: 1981     Years since quittin.3     Smokeless tobacco: Never Used     Tobacco comment: I quit in    Substance Use Topics     Alcohol use: Yes     Comment: 1 o 2 then switch  to na     If you drink alcohol do you typically have >3 drinks per day or >7 drinks per week? No    Alcohol Use 2020   Prescreen: >3 drinks/day or >7 drinks/week? -   Prescreen: >3 drinks/day or >7 drinks/week? No       Current providers sharing in care for this patient include: Patient Care Team:  Gloria Escobedo APRN CNP as PCP - General (Nurse Practitioner - Gerontology)  Sherice Estrada MD as MD (INTERNAL MEDICINE - ENDOCRINOLOGY, DIABETES & METABOLISM)  Gloria Escobedo APRN CNP as Assigned PCP    The following health maintenance items are reviewed in Epic and correct as of today:  Health Maintenance   Topic Date Due     INFLUENZA VACCINE (1) 2020     MEDICARE ANNUAL WELLNESS VISIT  2021     FALL RISK ASSESSMENT  2021     MAMMO SCREENING  2022     DTAP/TDAP/TD IMMUNIZATION (3 - Td) 2025     LIPID  2025     ADVANCE CARE PLANNING  2025     COLORECTAL CANCER SCREENING  2028     DEXA  Completed     HEPATITIS C SCREENING  Completed     PHQ-2  Completed     PNEUMOCOCCAL IMMUNIZATION 65+ LOW/MEDIUM RISK  Completed     ZOSTER IMMUNIZATION  Completed     IPV IMMUNIZATION  Aged Out     MENINGITIS IMMUNIZATION  Aged Out     HEPATITIS B IMMUNIZATION  Aged Out     Review of Systems   Constitutional: Negative for chills and fever.   HENT: Negative for congestion, ear pain, hearing loss and sore throat.    Eyes: Negative for pain and visual disturbance.   Respiratory: Negative for cough  "and shortness of breath.    Cardiovascular: Negative for chest pain, palpitations and peripheral edema.   Gastrointestinal: Negative for abdominal pain, constipation, diarrhea, heartburn, hematochezia and nausea.   Breasts:  Negative for tenderness, breast mass and discharge.   Genitourinary: Negative for dysuria, frequency, genital sores, hematuria, pelvic pain, urgency, vaginal bleeding and vaginal discharge.   Musculoskeletal: Negative for arthralgias, joint swelling and myalgias.   Skin: Negative for rash.   Neurological: Negative for dizziness, weakness, headaches and paresthesias.   Psychiatric/Behavioral: Negative for mood changes. The patient is not nervous/anxious.      Constitutional, HEENT, cardiovascular, pulmonary, gi and gu systems are negative, except as otherwise noted.    OBJECTIVE:   /66   Pulse 80   Temp 97.3  F (36.3  C) (Tympanic)   Resp 16   Ht 1.562 m (5' 1.5\")   Wt 53.5 kg (118 lb)   SpO2 99%   BMI 21.93 kg/m   Estimated body mass index is 21.93 kg/m  as calculated from the following:    Height as of this encounter: 1.562 m (5' 1.5\").    Weight as of this encounter: 53.5 kg (118 lb).  Physical Exam  GENERAL: healthy, alert and no distress  EYES: Eyes grossly normal to inspection, PERRL and conjunctivae and sclerae normal  HENT: ear canals and TM's normal, nose and mouth without ulcers or lesions  NECK: no adenopathy, no asymmetry, masses, or scars and thyroid normal to palpation  RESP: lungs clear to auscultation - no rales, rhonchi or wheezes  CV: regular rate and rhythm, normal S1 S2, no S3 or S4, no murmur, click or rub, no peripheral edema and peripheral pulses strong  MS: no gross musculoskeletal defects noted, no edema  SKIN: no suspicious lesions or rashes  NEURO: Normal strength and tone, mentation intact and speech normal  PSYCH: mentation appears normal, affect normal/bright    Diagnostic Test Results:  Labs reviewed in Epic    ASSESSMENT / PLAN:   1. Annual physical " "exam  -exam normal, discussed recent lab results     2. Osteopenia, unspecified location    - DX Hip/Pelvis/Spine; Future    3. Multiple thyroid nodules  -recommended follow up with endocrinologist and repeat thyroid US in 1 year       COUNSELING:  Reviewed preventive health counseling, as reflected in patient instructions       Regular exercise       Healthy diet/nutrition    Estimated body mass index is 21.93 kg/m  as calculated from the following:    Height as of this encounter: 1.562 m (5' 1.5\").    Weight as of this encounter: 53.5 kg (118 lb).         reports that she quit smoking about 39 years ago. Her smoking use included cigarettes. She started smoking about 47 years ago. She has a 4.50 pack-year smoking history. She has never used smokeless tobacco.      Appropriate preventive services were discussed with this patient, including applicable screening as appropriate for cardiovascular disease, diabetes, osteopenia/osteoporosis, and glaucoma.  As appropriate for age/gender, discussed screening for colorectal cancer, prostate cancer, breast cancer, and cervical cancer. Checklist reviewing preventive services available has been given to the patient.    Reviewed patients plan of care and provided an AVS. The Basic Care Plan (routine screening as documented in Health Maintenance) for Arabella meets the Care Plan requirement. This Care Plan has been established and reviewed with the Patient.    Counseling Resources:  ATP IV Guidelines  Pooled Cohorts Equation Calculator  Breast Cancer Risk Calculator  FRAX Risk Assessment  ICSI Preventive Guidelines  Dietary Guidelines for Americans, 2010  USDA's MyPlate  ASA Prophylaxis  Lung CA Screening    THOMAS Butler [facility]    Identified Health Risks:  "

## 2020-09-22 ENCOUNTER — OFFICE VISIT (OUTPATIENT)
Dept: DERMATOLOGY | Facility: CLINIC | Age: 70
End: 2020-09-22
Payer: COMMERCIAL

## 2020-09-22 VITALS — DIASTOLIC BLOOD PRESSURE: 82 MMHG | OXYGEN SATURATION: 99 % | HEART RATE: 76 BPM | SYSTOLIC BLOOD PRESSURE: 165 MMHG

## 2020-09-22 DIAGNOSIS — D23.9 DERMAL NEVUS: ICD-10-CM

## 2020-09-22 DIAGNOSIS — L82.1 SEBORRHEIC KERATOSIS: ICD-10-CM

## 2020-09-22 DIAGNOSIS — L81.4 LENTIGO: Primary | ICD-10-CM

## 2020-09-22 DIAGNOSIS — D18.01 ANGIOMA OF SKIN: ICD-10-CM

## 2020-09-22 PROCEDURE — 99213 OFFICE O/P EST LOW 20 MIN: CPT | Performed by: DERMATOLOGY

## 2020-09-22 NOTE — PROGRESS NOTES
Arabella Rodríguez is a 70 year old year old female patient here today for brown spot on back.   .  Patient states this has been present for a while.  Patient reports the following symptoms:  none.  Patient reports the following previous treatments none.  These treatments did not work.  Patient reports the following modifying factors none.  Associated symptoms: none.  Patient has no other skin complaints today.  Remainder of the HPI, Meds, PMH, Allergies, FH, and SH was reviewed in chart.      Past Medical History:   Diagnosis Date     Hypertension      Osteopenia      Thyroid nodules [facility]     benign        Past Surgical History:   Procedure Laterality Date     ABDOMEN SURGERY  1986         BIOPSY  2014    Thyroid     C ANESTH, SECTION       COLONOSCOPY       COLONOSCOPY N/A 2018    Procedure: COLONOSCOPY;  colonoscopy;  Surgeon: Xu Feliciano MD;  Location: [facility]     EXCISE MASS WRIST Left 2016    Procedure: EXCISE MASS WRIST;  Surgeon: Germain Hull MD;  Location: [facility]     TONSILLECTOMY          Family History   Problem Relation Age of Onset     Lipids Mother      Arthritis Mother      Hypertension Mother      Hyperlipidemia Mother      Osteopenia Mother      Hypertension Father      Hyperlipidemia Father      Prostate Cancer Father      Hypertension Paternal Grandfather      Neurologic Disorder Brother         brain tumor     Other Cancer Brother          Brain Surgery/ Brain Surgery     Depression Sister         early      Depression Niece      Diabetes No family hx of      Thyroid Cancer No family hx of      Thyroid Disease No family hx of        Social History     Socioeconomic History     Marital status:      Spouse name: Not on file     Number of children: Not on file     Years of education: Not on file     Highest education level: Not on file   Occupational History     Employer: [employer]   Social Needs      Financial resource strain: Not on file     Food insecurity     Worry: Not on file     Inability: Not on file     Transportation needs     Medical: Not on file     Non-medical: Not on file   Tobacco Use     Smoking status: Former Smoker     Packs/day: 0.50     Years: 9.00     Pack years: 4.50     Types: Cigarettes     Start date: 10/1/1972     Last attempt to quit: 1981     Years since quittin.4     Smokeless tobacco: Never Used     Tobacco comment: I quit in    Substance and Sexual Activity     Alcohol use: Yes     Comment: 1 o 2 then switch  to na     Drug use: No     Sexual activity: Not Currently     Partners: Male   Lifestyle     Physical activity     Days per week: Not on file     Minutes per session: Not on file     Stress: Not on file   Relationships     Social connections     Talks on phone: Not on file     Gets together: Not on file     Attends Christian service: Not on file     Active member of club or organization: Not on file     Attends meetings of clubs or organizations: Not on file     Relationship status: Not on file     Intimate partner violence     Fear of current or ex partner: Not on file     Emotionally abused: Not on file     Physically abused: Not on file     Forced sexual activity: Not on file   Other Topics Concern     Parent/sibling w/ CABG, MI or angioplasty before 65F 55M? No      Service No     Blood Transfusions No     Caffeine Concern Yes     Comment: 3 cups cofffee a day     Occupational Exposure No     Hobby Hazards No     Sleep Concern No     Stress Concern No     Weight Concern No     Special Diet Yes     Comment: calcium with D one a day     Back Care No     Exercise Yes     Comment: couple times a wk     Bike Helmet No     Seat Belt Yes     Self-Exams No   Social History Narrative     Not on file       Outpatient Encounter Medications as of 2020   Medication Sig Dispense Refill     CALCIUM + D OR 1 TABLET ORALLY DAILY       fluticasone (FLONASE) 50  MCG/ACT nasal spray Spray 1 spray into both nostrils daily       hydrochlorothiazide (HYDRODIURIL) 25 MG tablet TAKE ONE TABLET BY MOUTH ONCE DAILY 90 tablet 0     losartan (COZAAR) 50 MG tablet Take 1.5 tablets (75 mg) by mouth daily 135 tablet 0     MULTI-VITAMIN OR 1 TABLET ORALLY DAILY       Omega-3 Fatty Acids ([employer] FISH OIL) 1200 MG CPDR Take 1 capsule by mouth daily        triamcinolone (KENALOG) 0.1 % external cream Apply topically 2 times daily 30 g 0     valACYclovir (VALTREX) 1000 mg tablet If you have a cold sore use 2 tab po bid for 2 days.  If you have the rash on your leg, 0.5 po twice for 3 days. Hold on file until needed 30 tablet 1     No facility-administered encounter medications on file as of 9/22/2020.              Review Of Systems  Skin: As above  Eyes: negative  Ears/Nose/Throat: negative  Respiratory: No shortness of breath, dyspnea on exertion, cough, or hemoptysis  Cardiovascular: negative  Gastrointestinal: negative  Genitourinary: negative  Musculoskeletal: negative  Neurologic: negative  Psychiatric: negative  Hematologic/Lymphatic/Immunologic: negative  Endocrine: negative      O:   NAD, WDWN, Alert & Oriented, Mood & Affect wnl, Vitals stable   Here today alone  BP (!) 165/82   Pulse 76   SpO2 99%   General appearance normal   Vitals stable   Alert, oriented and in no acute distress      Following lymph nodes palpated: Occipital, Cervical, Supraclavicular none     Stuck on papules and brown macules on trunk and ext   Red papules on trunk  Flesh colored papules on trunk     The remainder of the full exam was normal; the following areas were examined:  conjunctiva/lids, oral mucosa, neck, peripheral vascular system, abdomen, lymph nodes, digits/nails, eccrine and apocrine glands, scalp/hair, face, neck, chest, abdomen, buttocks, back, RUE, LUE, RLE, LLE       Eyes: Conjunctivae/lids:Normal     ENT: Lips, buccal mucosa, tongue: normal    MSK:Normal    Cardiovascular: peripheral edema  none    Pulm: Breathing Normal    Lymph Nodes: No Head and Neck Lymphadenopathy     Neuro/Psych: Orientation:Alert and Orientedx3 ; Mood/Affect:normal       A/P:  1. Seborrheic keratosis, lentigo, angioma, dermal nevus  BENIGN LESIONS DISCUSSED WITH PATIENT:  I discussed the specifics of tumor, prognosis, and genetics of benign lesions.  I explained that treatment of these lesions would be purely cosmetic and not medically neccessary.  I discussed with patient different removal options including excision, cautery and /or laser.      Nature and genetics of benign skin lesions dicussed with patient.  Signs and Symptoms of skin cancer discussed with patient.  Patient encouraged to perform monthly skin exams.  UV precautions reviewed with patient.  Skin care regimen reviewed with patient: Eliminate harsh soaps, i.e. Dial, zest, irsih spring; Mild soaps such as Cetaphil or Dove sensitive skin, avoid hot or cold showers, aggressive use of emollients including vanicream, cetaphil or cerave discussed with patient.    Risks of non-melanoma skin cancer discussed with patient   Return to clinic 12 months

## 2020-09-22 NOTE — NURSING NOTE
"Initial BP (!) 165/82 (BP Location: Left arm, Patient Position: Sitting, Cuff Size: Adult Regular)   Pulse 76   SpO2 99%  Estimated body mass index is 21.93 kg/m  as calculated from the following:    Height as of 8/21/20: 1.562 m (5' 1.5\").    Weight as of 8/21/20: 53.5 kg (118 lb). .      "

## 2020-09-22 NOTE — LETTER
2020         RE: Arabella Rodríguez  [address] [place]  [facility] [address]        Dear Colleague,    Thank you for referring your patient, Arabella Rodríguez, to the [facility]. Please see a copy of my visit note below.    Arabella Rodríguez is a 70 year old year old female patient here today for brown spot on back.   .  Patient states this has been present for a while.  Patient reports the following symptoms:  none.  Patient reports the following previous treatments none.  These treatments did not work.  Patient reports the following modifying factors none.  Associated symptoms: none.  Patient has no other skin complaints today.  Remainder of the HPI, Meds, PMH, Allergies, FH, and SH was reviewed in chart.      Past Medical History:   Diagnosis Date     Hypertension      Osteopenia      Thyroid nodules [facility]     benign        Past Surgical History:   Procedure Laterality Date     ABDOMEN SURGERY  1986         BIOPSY  2014    Thyroid     C ANESTH, SECTION       COLONOSCOPY       COLONOSCOPY N/A 2018    Procedure: COLONOSCOPY;  colonoscopy;  Surgeon: Xu Feliciano MD;  Location: [facility]     EXCISE MASS WRIST Left 2016    Procedure: EXCISE MASS WRIST;  Surgeon: Germain Hull MD;  Location: [facility]     TONSILLECTOMY          Family History   Problem Relation Age of Onset     Lipids Mother      Arthritis Mother      Hypertension Mother      Hyperlipidemia Mother      Osteopenia Mother      Hypertension Father      Hyperlipidemia Father      Prostate Cancer Father      Hypertension Paternal Grandfather      Neurologic Disorder Brother         brain tumor     Other Cancer Brother          Brain Surgery/ Brain Surgery     Depression Sister         early      Depression Niece      Diabetes No family hx of      Thyroid Cancer No family hx of      Thyroid Disease No family hx of        Social History     Socioeconomic History      Marital status:      Spouse name: Not on file     Number of children: Not on file     Years of education: Not on file     Highest education level: Not on file   Occupational History     Employer: [employer]   Social Needs     Financial resource strain: Not on file     Food insecurity     Worry: Not on file     Inability: Not on file     Transportation needs     Medical: Not on file     Non-medical: Not on file   Tobacco Use     Smoking status: Former Smoker     Packs/day: 0.50     Years: 9.00     Pack years: 4.50     Types: Cigarettes     Start date: 10/1/1972     Last attempt to quit: 1981     Years since quittin.4     Smokeless tobacco: Never Used     Tobacco comment: I quit in    Substance and Sexual Activity     Alcohol use: Yes     Comment: 1 o 2 then switch  to na     Drug use: No     Sexual activity: Not Currently     Partners: Male   Lifestyle     Physical activity     Days per week: Not on file     Minutes per session: Not on file     Stress: Not on file   Relationships     Social connections     Talks on phone: Not on file     Gets together: Not on file     Attends Confucianist service: Not on file     Active member of club or organization: Not on file     Attends meetings of clubs or organizations: Not on file     Relationship status: Not on file     Intimate partner violence     Fear of current or ex partner: Not on file     Emotionally abused: Not on file     Physically abused: Not on file     Forced sexual activity: Not on file   Other Topics Concern     Parent/sibling w/ CABG, MI or angioplasty before 65F 55M? No      Service No     Blood Transfusions No     Caffeine Concern Yes     Comment: 3 cups cofffee a day     Occupational Exposure No     Hobby Hazards No     Sleep Concern No     Stress Concern No     Weight Concern No     Special Diet Yes     Comment: calcium with D one a day     Back Care No     Exercise Yes     Comment: couple times a wk     Bike  Helmet No     Seat Belt Yes     Self-Exams No   Social History Narrative     Not on file       Outpatient Encounter Medications as of 9/22/2020   Medication Sig Dispense Refill     CALCIUM + D OR 1 TABLET ORALLY DAILY       fluticasone (FLONASE) 50 MCG/ACT nasal spray Spray 1 spray into both nostrils daily       hydrochlorothiazide (HYDRODIURIL) 25 MG tablet TAKE ONE TABLET BY MOUTH ONCE DAILY 90 tablet 0     losartan (COZAAR) 50 MG tablet Take 1.5 tablets (75 mg) by mouth daily 135 tablet 0     MULTI-VITAMIN OR 1 TABLET ORALLY DAILY       Omega-3 Fatty Acids ([employer] FISH OIL) 1200 MG CPDR Take 1 capsule by mouth daily        triamcinolone (KENALOG) 0.1 % external cream Apply topically 2 times daily 30 g 0     valACYclovir (VALTREX) 1000 mg tablet If you have a cold sore use 2 tab po bid for 2 days.  If you have the rash on your leg, 0.5 po twice for 3 days. Hold on file until needed 30 tablet 1     No facility-administered encounter medications on file as of 9/22/2020.              Review Of Systems  Skin: As above  Eyes: negative  Ears/Nose/Throat: negative  Respiratory: No shortness of breath, dyspnea on exertion, cough, or hemoptysis  Cardiovascular: negative  Gastrointestinal: negative  Genitourinary: negative  Musculoskeletal: negative  Neurologic: negative  Psychiatric: negative  Hematologic/Lymphatic/Immunologic: negative  Endocrine: negative      O:   NAD, WDWN, Alert & Oriented, Mood & Affect wnl, Vitals stable   Here today alone  BP (!) 165/82   Pulse 76   SpO2 99%   General appearance normal   Vitals stable   Alert, oriented and in no acute distress      Following lymph nodes palpated: Occipital, Cervical, Supraclavicular none     Stuck on papules and brown macules on trunk and ext   Red papules on trunk  Flesh colored papules on trunk     The remainder of the full exam was normal; the following areas were examined:  conjunctiva/lids, oral mucosa, neck, peripheral vascular system, abdomen, lymph nodes,  digits/nails, eccrine and apocrine glands, scalp/hair, face, neck, chest, abdomen, buttocks, back, RUE, LUE, RLE, LLE       Eyes: Conjunctivae/lids:Normal     ENT: Lips, buccal mucosa, tongue: normal    MSK:Normal    Cardiovascular: peripheral edema none    Pulm: Breathing Normal    Lymph Nodes: No Head and Neck Lymphadenopathy     Neuro/Psych: Orientation:Alert and Orientedx3 ; Mood/Affect:normal       A/P:  1. Seborrheic keratosis, lentigo, angioma, dermal nevus  BENIGN LESIONS DISCUSSED WITH PATIENT:  I discussed the specifics of tumor, prognosis, and genetics of benign lesions.  I explained that treatment of these lesions would be purely cosmetic and not medically neccessary.  I discussed with patient different removal options including excision, cautery and /or laser.      Nature and genetics of benign skin lesions dicussed with patient.  Signs and Symptoms of skin cancer discussed with patient.  Patient encouraged to perform monthly skin exams.  UV precautions reviewed with patient.  Skin care regimen reviewed with patient: Eliminate harsh soaps, i.e. Dial, zest, irsih spring; Mild soaps such as Cetaphil or Dove sensitive skin, avoid hot or cold showers, aggressive use of emollients including vanicream, cetaphil or cerave discussed with patient.    Risks of non-melanoma skin cancer discussed with patient   Return to clinic 12 months      Again, thank you for allowing me to participate in the care of your patient.        Sincerely,        Elkin Hooks MD

## 2020-09-28 DIAGNOSIS — I10 ESSENTIAL HYPERTENSION: ICD-10-CM

## 2020-09-29 NOTE — TELEPHONE ENCOUNTER
"Requested Prescriptions   Pending Prescriptions Disp Refills     losartan (COZAAR) 50 MG tablet [Pharmacy Med Name: LOSARTAN POTASSIUM 50MG TABS] 135 tablet 0     Sig: TAKE 1 AND 1/2 TABLETS BY MOUTH ONCE DAILY       Angiotensin-II Receptors Failed - 9/28/2020  1:53 PM        Failed - Last blood pressure under 140/90 in past 12 months     BP Readings from Last 3 Encounters:   09/22/20 (!) 165/82   08/21/20 138/66   07/08/19 126/72                 Passed - Recent (12 mo) or future (30 days) visit within the authorizing provider's specialty     Patient has had an office visit with the authorizing provider or a provider within the authorizing providers department within the previous 12 mos or has a future within next 30 days. See \"Patient Info\" tab in inbasket, or \"Choose Columns\" in Meds & Orders section of the refill encounter.              Passed - Medication is active on med list        Passed - Patient is age 18 or older        Passed - No active pregnancy on record        Passed - Normal serum creatinine on file in past 12 months     Recent Labs   Lab Test 08/12/20  0918   CR 0.77       Ok to refill medication if creatinine is low          Passed - Normal serum potassium on file in past 12 months     Recent Labs   Lab Test 08/12/20  0918   POTASSIUM 3.8                    Passed - No positive pregnancy test in past 12 months           hydrochlorothiazide (HYDRODIURIL) 25 MG tablet [Pharmacy Med Name: HYDROCHLOROTHIAZIDE 25MG TABS] 90 tablet 0     Sig: TAKE ONE TABLET BY MOUTH ONCE DAILY       Diuretics (Including Combos) Protocol Failed - 9/28/2020  1:53 PM        Failed - Blood pressure under 140/90 in past 12 months     BP Readings from Last 3 Encounters:   09/22/20 (!) 165/82   08/21/20 138/66   07/08/19 126/72                 Failed - Normal serum sodium on file in past 12 months     Recent Labs   Lab Test 08/12/20  0918   *              Passed - Recent (12 mo) or future (30 days) visit within the " "authorizing provider's specialty     Patient has had an office visit with the authorizing provider or a provider within the authorizing providers department within the previous 12 mos or has a future within next 30 days. See \"Patient Info\" tab in inbasket, or \"Choose Columns\" in Meds & Orders section of the refill encounter.              Passed - Medication is active on med list        Passed - Patient is age 18 or older        Passed - No active pregancy on record        Passed - Normal serum creatinine on file in past 12 months     Recent Labs   Lab Test 08/12/20  0918   CR 0.77              Passed - Normal serum potassium on file in past 12 months     Recent Labs   Lab Test 08/12/20  0918   POTASSIUM 3.8                    Passed - No positive pregnancy test in past 12 months             "

## 2020-09-30 RX ORDER — LOSARTAN POTASSIUM 50 MG/1
TABLET ORAL
Qty: 135 TABLET | Refills: 0 | Status: SHIPPED | OUTPATIENT
Start: 2020-09-30 | End: 2020-12-27

## 2020-09-30 RX ORDER — HYDROCHLOROTHIAZIDE 25 MG/1
TABLET ORAL
Qty: 90 TABLET | Refills: 0 | Status: SHIPPED | OUTPATIENT
Start: 2020-09-30 | End: 2020-12-27

## 2020-10-14 ENCOUNTER — IMMUNIZATION (OUTPATIENT)
Dept: FAMILY MEDICINE | Facility: CLINIC | Age: 70
End: 2020-10-14
Payer: COMMERCIAL

## 2020-10-14 PROCEDURE — 90471 IMMUNIZATION ADMIN: CPT

## 2020-10-14 PROCEDURE — 90662 IIV NO PRSV INCREASED AG IM: CPT

## 2020-10-21 ENCOUNTER — HOSPITAL ENCOUNTER (OUTPATIENT)
Dept: BONE DENSITY | Facility: CLINIC | Age: 70
Discharge: HOME | End: 2020-10-21
Attending: NURSE PRACTITIONER | Admitting: NURSE PRACTITIONER
Payer: COMMERCIAL

## 2020-10-21 DIAGNOSIS — M85.80 OSTEOPENIA, UNSPECIFIED LOCATION: ICD-10-CM

## 2020-10-21 PROCEDURE — 77080 DXA BONE DENSITY AXIAL: CPT

## 2020-10-27 ENCOUNTER — TELEPHONE (OUTPATIENT)
Dept: ENDOCRINOLOGY | Facility: CLINIC | Age: 70
End: 2020-10-27

## 2020-10-27 NOTE — TELEPHONE ENCOUNTER
[facility] Call Center    Phone Message    May a detailed message be left on voicemail: yes     Reason for Call: Patient would like to know if Dr. Kraus has any comments regarding the results from thyroid ultrasound that was done in July. Per Pt, the results were posted, but no comments and Dr. Estrada usually adds comments for the patient. Patient would like a note sent through [employer] with an update.     Action Taken: Message routed to:  [facility] ([facility] END[facility]    Travel Screening: Not Applicable

## 2020-10-28 NOTE — TELEPHONE ENCOUNTER
Dayan was last seen in 11/2018, 2 years ago.  I would have expected her to schedule a follow up visit to discuss results.  She can see the radiologists report. Further discussion is too much for mychart.   Help her schedule an appointment.  Sherice Esrtada MD

## 2020-12-18 NOTE — PROGRESS NOTES
"Arabella Rodríguez is a 70 year old female who is being evaluated via a billable video visit.      The patient has been notified of following:     \"This video visit will be conducted via a call between you and your physician/provider. We have found that certain health care needs can be provided without the need for an in-person physical exam.  This service lets us provide the care you need with a video conversation.  If a prescription is necessary we can send it directly to your pharmacy.  If lab work is needed we can place an order for that and you can then stop by our lab to have the test done at a later time.    Video visits are billed at different rates depending on your insurance coverage.  Please reach out to your insurance provider with any questions.    If during the course of the call the physician/provider feels a video visit is not appropriate, you will not be charged for this service.\"    Patient has given verbal consent for Video visit? Yes  How would you like to obtain your AVS? Mail a copy  Will anyone else be joining your video visit? No    Video-Visit Details    Type of service:  Video Visit    Distant Location (provider location):  [facility] LENORE Mccarthy MA            "

## 2020-12-21 ENCOUNTER — VIRTUAL VISIT (OUTPATIENT)
Dept: ENDOCRINOLOGY | Facility: CLINIC | Age: 70
End: 2020-12-21
Payer: COMMERCIAL

## 2020-12-21 DIAGNOSIS — M85.9 LOW BONE DENSITY: ICD-10-CM

## 2020-12-21 DIAGNOSIS — E04.2 MULTIPLE THYROID NODULES: Primary | ICD-10-CM

## 2020-12-21 PROCEDURE — 99213 OFFICE O/P EST LOW 20 MIN: CPT | Mod: 95

## 2020-12-21 NOTE — PATIENT INSTRUCTIONS
Repeat US in about 2 weeks (around July 2022)    Next bone density should be 10/2022 -    See me after you do it      CALCIUM Recommendation 1200 mg/day in divided dose of no more than 500 mg/dose. This includes what is in your food and also what is in any supplements you are taking.      Dietary sources of calcium: These also contain vitamin D  Milk / buttermilk              8 oz            300 mg  Dry milk powder       5 Tbsp             300 mg  Yogurt                          1 cup           400 mg  Ice cream   1/2 cup          100 mg  Hard cheese                     1.5 oz          300 mg  Cottage cheese                1/2 cup        65 mg  Spinach, cooked  1 cup  240 mg  Tofu, soft regular           4 oz          120 to 390 mg  Sardines                       3 oz               370 mg  Mackerel canned         3 oz                250 mg  Canned salmon with bones 3 oz        170-210 mg  Calcium fortified cereals are available  SILK soy and almond milk products are calcium fortified  Orange juice  Fortified with Calcium       8 oz            300 mg  Low fat dairy sources are recommended      Recommended exercise goals:    30 minutes of moderate exercise on most days of the week .  Weight bearing exercise (ie, walking, jogging, hiking, dancing)    Strength training 2 or more times/week in addition to other weight -being exercise.  Strength training -- uses weight or resistance beyond that seen in everyday activities -(pilates, weight training with free weights, weight machines or resistance bands)

## 2020-12-21 NOTE — LETTER
"12/21/2020       RE: Arabella Rodríguez  [address] [place]  [facility] [address]     Dear Colleague,    Thank you for referring your patient, Arabella Rodríguez, to the [facility] [place] at [facility]. Please see a copy of my visit note below.    Arabella Rodríguez is a 70 year old female who is being evaluated via a billable video visit.      The patient has been notified of following:     \"This video visit will be conducted via a call between you and your physician/provider. We have found that certain health care needs can be provided without the need for an in-person physical exam.  This service lets us provide the care you need with a video conversation.  If a prescription is necessary we can send it directly to your pharmacy.  If lab work is needed we can place an order for that and you can then stop by our lab to have the test done at a later time.    Video visits are billed at different rates depending on your insurance coverage.  Please reach out to your insurance provider with any questions.    If during the course of the call the physician/provider feels a video visit is not appropriate, you will not be charged for this service.\"    Patient has given verbal consent for Video visit? Yes  How would you like to obtain your AVS? Mail a copy  Will anyone else be joining your video visit? No    Video-Visit Details    Type of service:  Video Visit    Distant Location (provider location):  [facility] [place]     Erika Mccarthy MA              Endocrinology Attending Physician Progress note    Assessment / Plan  1.  Nodular goiter - multiple thyroid nodules  A) Right #1 is probably biopsied in 2010 and ? In 2014. 10/16/19: insufficient  B) Right # 3 is cystic larger   Next [place] 28814 and see me afterwards    2.  Low BMD.  She is having significant bone loss at the L spine.  FRAX 8.8/1.2%.  Discussed  Next DXA " "10/2022 and see me afterwards    Due to the COVID 19 pandemic this visit was a telephone/video visit in order to help prevent spread of infection in this high risk patient and the general population. The patient gave verbal consent for the visit today.    Start time 1319  Stop time 1328  Total time 9 minutes      Sherice Estrada MD.      Cc/ HPI :  70 year old woman returns for follow up of goiter/ thyroid nodule and low BMD.   She was last seen by me 11/18. She already had thyroid US and DXA in anticipation of this appt.  /At our 2018 appt we decided to repeat the FNAB on the right dominant solid nodule.  The FNAB was not done until 10/16/2019 -  Cytology non diagnostic/ insufficient     Goiter had been lst noted 6/10 during a routine exam.  8/11/10 calcitonin was < 2.  8/24/12 BRADEN < 20, TPO < 10. She has had normal TFTS, most recently 3/27/18: TSH 1.62, free T4 0.95  2010 US FNAB of a complex/ cystic right thyroid nodule (# 2, as defined on the 6/30/10 US)  benign cytology.    9/23/14: Right cyst fluid and mural component: benign    I have reviewed the images on PACS today (as read by me)   7/24/2020 thyroid US: compared with 9/9/18   Right # 1 1.3 x 1 x 1.2 cm - shadowing eggshell calc-- (was 1.5 x 1.1 x  this had FNAB 10/16/19 insufficient  Right posterior  # 2 ;  0.7 x 0.6 x 0.7 cm (was 0.9 x 0.7 x   \"left lobe # 4\" (which looks like isthmus midline to my eye) 0.8 x 0.5 x 1.1 cm  (was 0.6  X 0.3 x 0.6   Right 3 # 3 2.4 x 1.1 x 2.4 mixed > 50% cystic (was 1.4 x 0.9 x     10/21/2020 DXA ( [facility]): compared with 12/17/18   lowest T-score -1.4 left femoral neck  L spine 1.076- was 1.142  -5.8%  Left total hip 0.930; was 0.880-- bone gain  Right total hip 0.841; was 0.831 - stable.     She is on calcium with vitamin D , once/day. :She doesn't know her dose. She has dairy intake in her diet. She walks regularly.  There is a family history of low bone density in her mother.    LMP was mid 50s.  She never took " HRT     ROS   Energy OK  Some sleep trouble - anxiety from mother in assisted living;   She has not had covid  Neck: can see the nodule.   Drainage into throat  Swallow OK  Voice OK  Cardiac: negative  Respiratory:   GI: negative  Not worrying a lot about thyroid    Family Hx   Family History   Problem Relation Age of Onset     Lipids Mother      Arthritis Mother      Hypertension Mother      Hyperlipidemia Mother      Osteopenia Mother      Hypertension Father      Hyperlipidemia Father      Prostate Cancer Father      Hypertension Paternal Grandfather      Neurologic Disorder Brother         brain tumor     Other Cancer Brother          Brain Surgery/ Brain Surgery     Depression Sister         early      Depression Niece      Diabetes No family hx of      Thyroid Cancer No family hx of      Thyroid Disease No family hx of      brain tumor in brother - presented with seizure  .  Personal Hx   Behavioral history: No tobacco use.  Home environment: No secondhand tobacco smoke in home.  ; has membership to [employer] - does low impact and cardio strength training 3 times/week.  30 min 3 times/week.  She is thinking of adding a walking routine inside.  She hasn't een getting outside to walk due to the weather.       PMH   Past Medical History:   Diagnosis Date     Hypertension      Osteopenia      Thyroid nodules [facility]     benign      Past Surgical History:   Procedure Laterality Date     ABDOMEN SURGERY  1986         BIOPSY  2014    Thyroid     C ANESTH, SECTION       COLONOSCOPY       COLONOSCOPY N/A 2018    Procedure: COLONOSCOPY;  colonoscopy;  Surgeon: Xu Feliciano MD;  Location: [facility]     EXCISE MASS WRIST Left 2016    Procedure: EXCISE MASS WRIST;  Surgeon: Germain Hull MD;  Location: [facility]     TONSILLECTOMY         Current Outpatient Medications   Medication Sig Dispense Refill     CALCIUM + D OR 1 TABLET ORALLY DAILY        "hydrochlorothiazide (HYDRODIURIL) 25 MG tablet TAKE ONE TABLET BY MOUTH ONCE DAILY 90 tablet 0     losartan (COZAAR) 50 MG tablet TAKE 1 AND 1/2 TABLETS BY MOUTH ONCE DAILY 135 tablet 0     MULTI-VITAMIN OR 1 TABLET ORALLY DAILY       Omega-3 Fatty Acids ([employer] FISH OIL) 1200 MG CPDR Take 1 capsule by mouth daily        triamcinolone (KENALOG) 0.1 % external cream Apply topically 2 times daily 30 g 0     valACYclovir (VALTREX) 1000 mg tablet If you have a cold sore use 2 tab po bid for 2 days.  If you have the rash on your leg, 0.5 po twice for 3 days. Hold on file until needed 30 tablet 1     fluticasone (FLONASE) 50 MCG/ACT nasal spray Spray 1 spray into both nostrils daily       Physical Exam   There were no vitals taken for this visit.   BP Readings from Last 1 Encounters:   09/22/20 (!) 165/82      Pulse Readings from Last 1 Encounters:   09/22/20 76      Resp Readings from Last 1 Encounters:   08/21/20 16      Temp Readings from Last 1 Encounters:   08/21/20 97.3  F (36.3  C) (Tympanic)      SpO2 Readings from Last 1 Encounters:   09/22/20 99%      Wt Readings from Last 1 Encounters:   08/21/20 53.5 kg (118 lb)      Ht Readings from Last 1 Encounters:   08/21/20 1.562 m (5' 1.5\")   GENERAL: pleasant woman in no distress; her voice is normal  SKIN: Visible skin clear. No significant rash, abnormal pigmentation or lesions.  EYES: Eyes grossly normal to inspection.  No discharge or erythema, or obvious scleral/conjunctival abnormalities.  NECK: subtle visible right of center/center mass shadow -   RESP: No audible wheeze, cough, or visible cyanosis.  No visible retractions or increased work of breathing.    NEURO:   Awake, alert, responds appropriately to questions.  Mentation and speech fluent.  PSYCH:affect normal, judgement and insight intact, and appearance well-groomed.    DATA REVIEW:      ULTRASOUND THYROID July 24, 2020 10:30 AM     CLINICAL HISTORY: Thyroid nodule follow-up.     TECHNIQUE: Thyroid " ultrasound.      COMPARISON: 9/19/2018.      FINDINGS:  RIGHT lobe: 4.2 x 1.7 x 1.5 cm. Homogeneous echotexture.  Isthmus: 3 mm.  LEFT lobe: 4 x 1.2 x 1.4 cm. Homogeneous echotexture.     NODULES:     Nodule 1: Hypoechoic solid nodule in the inferior right thyroid lobe  has decreased slightly in size, measuring 1.2 x 1 x 1.3 cm (previously  1.5 x 1.1 x 1.3 cm).   Composition: Solid or almost completely solid, 2 points   Echogenicity: Hypoechoic, 2 points   Shape: Wider-than-tall, 0 points   Margin: Ill-defined, 0 points   Echogenic Foci: None, or large comet-tail artifacts, 0 points   Point Total: 4-6 points. TI-RADS 4. If 1.5 cm or larger, recommend  FNA; if 1 cm or larger, follow up US (annually for 5 years).      Nodule 2: Hypoechoic solid nodule in the mid right thyroid lobe is not  significantly changed, measuring 0.7 x 0.7 x 0.6 cm.  Composition: Solid or almost completely solid, 2 points   Echogenicity: Hypoechoic, 2 points   Shape: Wider-than-tall, 0 points   Margin: Smooth, 0 points   Echogenic Foci: None, or large comet-tail artifacts, 0 points   Point Total: 4-6 points. TI-RADS 4. If 1.5 cm or larger, recommend  FNA; if 1 cm or larger, follow up US (annually for 5 years).     Nodule 3: Complex cystic and solid nodule in the inferior right  thyroid lobe has increased slightly in size, measuring 2.4 x 1.1 x 2.4  cm (previously 2 x 0.6 x 1.6 cm).   Composition: Mixed cystic and solid, 1 point   Echogenicity: Unable to determine, 1 point   Shape: Wider-than-tall, 0 points   Margin: Smooth, 0 points   Echogenic Foci: None, or large comet-tail artifacts, 0 points   Point Total: 1-2 points. TI-RADS 2. No FNA.     Nodule 4: Hypoechoic solid nodule in the inferior left thyroid lobe  has increased slightly in size, measuring 1.1 x 0.5 x 0.8 cm  (previously 0.6 x 0.3 x 0.6 cm).  Composition: Solid or almost completely solid, 2 points   Echogenicity: Hypoechoic, 2 points   Shape: Wider-than-tall, 0 points   Margin:  Smooth, 0 points   Echogenic Foci: None, or large comet-tail artifacts, 0 points   Point Total: 4-6 points. TI-RADS 4. If 1.5 cm or larger, recommend  FNA; if 1 cm or larger, follow up US (annually for 5 years).                                                                      IMPRESSION: Four thyroid nodules are described above, two of which  have increased slightly in size, and one of which has decreased  slightly in size.     Nodules are characterized per  ACR Thyroid Imaging, Reporting and Data System (TI-RADS): White Paper  of the ACR TI-RADS Committee  Robert Linda et al. Journal of the American College of  Radiology 2017. Volume 14 (2017), Issue 5, 420-921.      OG MATHIAS MD    DX HIP/PELVIS/SPINE  10/21/2020 12:56 PM     HISTORY:  Osteopenia, unspecified location. Post menopause.     FINDINGS: This DEXA scan was performed using a [employer].   DEXA results are reported according to T-score.  The T-score is the  standard deviation from the peak bone mass in a normal young adult  population.  In accordance with the ISCD (International Society of  Clinical Densitometry), the lower of the total proximal femur vs  femoral neck T-score is reported.  Osteopenia is defined as a T-score  of -1.0 to -2.5.  Osteoporosis is defined as a T-score of less than  -2.5.     T-SCORES:  Lumbar Spine L1-L4 T-score: -1.0     Left Hip (Neck) T-score: -1.4  Right Hip (Neck) T-score: -1.3     Hip lowest neck BMD: 0.844 gm/cm2.     PERCENT CHANGE since 12/70/2018:  Lumbar Spine: Decreased significantly by -5.8%  Femurs: Not significantly changed, 0%                                                                      IMPRESSION: Lumbar spine and bilateral hip osteopenia.     MD Sherice CHOI MD

## 2020-12-21 NOTE — PROGRESS NOTES
"Endocrinology Attending Physician Progress note    Assessment / Plan  1.  Nodular goiter - multiple thyroid nodules  A) Right #1 is probably biopsied in 2010 and ? In 2014. 10/16/19: insufficient  B) Right # 3 is cystic larger   Next US 55090 and see me afterwards    2.  Low BMD.  She is having significant bone loss at the L spine.  FRAX 8.8/1.2%.  Discussed  Next DXA 10/2022 and see me afterwards    Due to the COVID 19 pandemic this visit was a telephone/video visit in order to help prevent spread of infection in this high risk patient and the general population. The patient gave verbal consent for the visit today.    Start time 1319  Stop time 1328  Total time 9 minutes      Sherice Estrada MD.      Cc/ HPI :  70 year old woman returns for follow up of goiter/ thyroid nodule and low BMD.   She was last seen by me 11/18. She already had thyroid US and DXA in anticipation of this appt.  /At our 2018 appt we decided to repeat the FNAB on the right dominant solid nodule.  The FNAB was not done until 10/16/2019 -  Cytology non diagnostic/ insufficient     Goiter had been lst noted 6/10 during a routine exam.  8/11/10 calcitonin was < 2.  8/24/12 BRADEN < 20, TPO < 10. She has had normal TFTS, most recently 3/27/18: TSH 1.62, free T4 0.95  2010 US FNAB of a complex/ cystic right thyroid nodule (# 2, as defined on the 6/30/10 US)  benign cytology.    9/23/14: Right cyst fluid and mural component: benign    I have reviewed the images on PACS today (as read by me)   7/24/2020 thyroid US: compared with 9/9/18   Right # 1 1.3 x 1 x 1.2 cm - shadowing eggshell calc-- (was 1.5 x 1.1 x  this had FNAB 10/16/19 insufficient  Right posterior  # 2 ;  0.7 x 0.6 x 0.7 cm (was 0.9 x 0.7 x   \"left lobe # 4\" (which looks like isthmus midline to my eye) 0.8 x 0.5 x 1.1 cm  (was 0.6  X 0.3 x 0.6   Right 3 # 3 2.4 x 1.1 x 2.4 mixed > 50% cystic (was 1.4 x 0.9 x     10/21/2020 DXA ([facility]): compared with 12/17/18   lowest T-score -1.4 " left femoral neck  L spine 1.076- was 1.142  -5.8%  Left total hip 0.930; was 0.880-- bone gain  Right total hip 0.841; was 0.831 - stable.     She is on calcium with vitamin D , once/day. :She doesn't know her dose. She has dairy intake in her diet. She walks regularly.  There is a family history of low bone density in her mother.    LMP was mid 50s.  She never took HRT     ROS   Energy OK  Some sleep trouble - anxiety from mother in assisted living;   She has not had covid  Neck: can see the nodule.   Drainage into throat  Swallow OK  Voice OK  Cardiac: negative  Respiratory:   GI: negative  Not worrying a lot about thyroid    Family Hx   Family History   Problem Relation Age of Onset     Lipids Mother      Arthritis Mother      Hypertension Mother      Hyperlipidemia Mother      Osteopenia Mother      Hypertension Father      Hyperlipidemia Father      Prostate Cancer Father      Hypertension Paternal Grandfather      Neurologic Disorder Brother         brain tumor     Other Cancer Brother          Brain Surgery/ Brain Surgery     Depression Sister         early      Depression Niece      Diabetes No family hx of      Thyroid Cancer No family hx of      Thyroid Disease No family hx of      brain tumor in brother - presented with seizure  .  Personal Hx   Behavioral history: No tobacco use.  Home environment: No secondhand tobacco smoke in home.  ; has membership to [employer] gym - does low impact and cardio strength training 3 times/week.  30 min 3 times/week.  She is thinking of adding a walking routine inside.  She hasn't een getting outside to walk due to the weather.       PMH   Past Medical History:   Diagnosis Date     Hypertension      Osteopenia      Thyroid nodules [facility]     benign      Past Surgical History:   Procedure Laterality Date     ABDOMEN SURGERY  1986         BIOPSY  2014    Thyroid     C ANESTH, SECTION       COLONOSCOPY        "COLONOSCOPY N/A 1/31/2018    Procedure: COLONOSCOPY;  colonoscopy;  Surgeon: Xu Feliciano MD;  Location: [facility]     EXCISE MASS WRIST Left 6/21/2016    Procedure: EXCISE MASS WRIST;  Surgeon: Germain Hull MD;  Location: [facility]     TONSILLECTOMY         Current Outpatient Medications   Medication Sig Dispense Refill     CALCIUM + D OR 1 TABLET ORALLY DAILY       hydrochlorothiazide (HYDRODIURIL) 25 MG tablet TAKE ONE TABLET BY MOUTH ONCE DAILY 90 tablet 0     losartan (COZAAR) 50 MG tablet TAKE 1 AND 1/2 TABLETS BY MOUTH ONCE DAILY 135 tablet 0     MULTI-VITAMIN OR 1 TABLET ORALLY DAILY       Omega-3 Fatty Acids ([employer] FISH OIL) 1200 MG CPDR Take 1 capsule by mouth daily        triamcinolone (KENALOG) 0.1 % external cream Apply topically 2 times daily 30 g 0     valACYclovir (VALTREX) 1000 mg tablet If you have a cold sore use 2 tab po bid for 2 days.  If you have the rash on your leg, 0.5 po twice for 3 days. Hold on file until needed 30 tablet 1     fluticasone (FLONASE) 50 MCG/ACT nasal spray Spray 1 spray into both nostrils daily       Physical Exam   There were no vitals taken for this visit.   BP Readings from Last 1 Encounters:   09/22/20 (!) 165/82      Pulse Readings from Last 1 Encounters:   09/22/20 76      Resp Readings from Last 1 Encounters:   08/21/20 16      Temp Readings from Last 1 Encounters:   08/21/20 97.3  F (36.3  C) (Tympanic)      SpO2 Readings from Last 1 Encounters:   09/22/20 99%      Wt Readings from Last 1 Encounters:   08/21/20 53.5 kg (118 lb)      Ht Readings from Last 1 Encounters:   08/21/20 1.562 m (5' 1.5\")   GENERAL: pleasant woman in no distress; her voice is normal  SKIN: Visible skin clear. No significant rash, abnormal pigmentation or lesions.  EYES: Eyes grossly normal to inspection.  No discharge or erythema, or obvious scleral/conjunctival abnormalities.  NECK: subtle visible right of center/center mass shadow -   RESP: No audible wheeze, cough, or visible " cyanosis.  No visible retractions or increased work of breathing.    NEURO:   Awake, alert, responds appropriately to questions.  Mentation and speech fluent.  PSYCH:affect normal, judgement and insight intact, and appearance well-groomed.    DATA REVIEW:      ULTRASOUND THYROID July 24, 2020 10:30 AM     CLINICAL HISTORY: Thyroid nodule follow-up.     TECHNIQUE: Thyroid ultrasound.      COMPARISON: 9/19/2018.      FINDINGS:  RIGHT lobe: 4.2 x 1.7 x 1.5 cm. Homogeneous echotexture.  Isthmus: 3 mm.  LEFT lobe: 4 x 1.2 x 1.4 cm. Homogeneous echotexture.     NODULES:     Nodule 1: Hypoechoic solid nodule in the inferior right thyroid lobe  has decreased slightly in size, measuring 1.2 x 1 x 1.3 cm (previously  1.5 x 1.1 x 1.3 cm).   Composition: Solid or almost completely solid, 2 points   Echogenicity: Hypoechoic, 2 points   Shape: Wider-than-tall, 0 points   Margin: Ill-defined, 0 points   Echogenic Foci: None, or large comet-tail artifacts, 0 points   Point Total: 4-6 points. TI-RADS 4. If 1.5 cm or larger, recommend  FNA; if 1 cm or larger, follow up US (annually for 5 years).      Nodule 2: Hypoechoic solid nodule in the mid right thyroid lobe is not  significantly changed, measuring 0.7 x 0.7 x 0.6 cm.  Composition: Solid or almost completely solid, 2 points   Echogenicity: Hypoechoic, 2 points   Shape: Wider-than-tall, 0 points   Margin: Smooth, 0 points   Echogenic Foci: None, or large comet-tail artifacts, 0 points   Point Total: 4-6 points. TI-RADS 4. If 1.5 cm or larger, recommend  FNA; if 1 cm or larger, follow up US (annually for 5 years).     Nodule 3: Complex cystic and solid nodule in the inferior right  thyroid lobe has increased slightly in size, measuring 2.4 x 1.1 x 2.4  cm (previously 2 x 0.6 x 1.6 cm).   Composition: Mixed cystic and solid, 1 point   Echogenicity: Unable to determine, 1 point   Shape: Wider-than-tall, 0 points   Margin: Smooth, 0 points   Echogenic Foci: None, or large comet-tail  artifacts, 0 points   Point Total: 1-2 points. TI-RADS 2. No FNA.     Nodule 4: Hypoechoic solid nodule in the inferior left thyroid lobe  has increased slightly in size, measuring 1.1 x 0.5 x 0.8 cm  (previously 0.6 x 0.3 x 0.6 cm).  Composition: Solid or almost completely solid, 2 points   Echogenicity: Hypoechoic, 2 points   Shape: Wider-than-tall, 0 points   Margin: Smooth, 0 points   Echogenic Foci: None, or large comet-tail artifacts, 0 points   Point Total: 4-6 points. TI-RADS 4. If 1.5 cm or larger, recommend  FNA; if 1 cm or larger, follow up US (annually for 5 years).                                                                      IMPRESSION: Four thyroid nodules are described above, two of which  have increased slightly in size, and one of which has decreased  slightly in size.     Nodules are characterized per  ACR Thyroid Imaging, Reporting and Data System (TI-RADS): White Paper  of the ACR TI-RADS Committee  Robert Linda. et al. Journal of the American College of  Radiology 2017. Volume 14 (2017), Issue 5, 280-772.      OG MATHIAS MD    DX HIP/PELVIS/SPINE  10/21/2020 12:56 PM     HISTORY:  Osteopenia, unspecified location. Post menopause.     FINDINGS: This DEXA scan was performed using a [employer].   DEXA results are reported according to T-score.  The T-score is the  standard deviation from the peak bone mass in a normal young adult  population.  In accordance with the ISCD (International Society of  Clinical Densitometry), the lower of the total proximal femur vs  femoral neck T-score is reported.  Osteopenia is defined as a T-score  of -1.0 to -2.5.  Osteoporosis is defined as a T-score of less than  -2.5.     T-SCORES:  Lumbar Spine L1-L4 T-score: -1.0     Left Hip (Neck) T-score: -1.4  Right Hip (Neck) T-score: -1.3     Hip lowest neck BMD: 0.844 gm/cm2.     PERCENT CHANGE since 12/70/2018:  Lumbar Spine: Decreased significantly by -5.8%  Femurs: Not significantly  changed, 0%                                                                      IMPRESSION: Lumbar spine and bilateral hip osteopenia.     DAVID POMPA MD

## 2020-12-27 ENCOUNTER — MYC REFILL (OUTPATIENT)
Dept: FAMILY MEDICINE | Facility: CLINIC | Age: 70
End: 2020-12-27

## 2020-12-27 DIAGNOSIS — I10 ESSENTIAL HYPERTENSION: ICD-10-CM

## 2020-12-28 NOTE — TELEPHONE ENCOUNTER
"Requested Prescriptions   Pending Prescriptions Disp Refills     hydrochlorothiazide (HYDRODIURIL) 25 MG tablet [Pharmacy Med Name: HYDROCHLOROTHIAZIDE 25MG TABS] 90 tablet 0     Sig: TAKE ONE TABLET BY MOUTH ONCE DAILY       Diuretics (Including Combos) Protocol Failed - 12/27/2020 12:09 PM        Failed - Blood pressure under 140/90 in past 12 months     BP Readings from Last 3 Encounters:   09/22/20 (!) 165/82   08/21/20 138/66   07/08/19 126/72                 Failed - Normal serum sodium on file in past 12 months     Recent Labs   Lab Test 08/12/20  0918   *              Passed - Recent (12 mo) or future (30 days) visit within the authorizing provider's specialty     Patient has had an office visit with the authorizing provider or a provider within the authorizing providers department within the previous 12 mos or has a future within next 30 days. See \"Patient Info\" tab in inbasket, or \"Choose Columns\" in Meds & Orders section of the refill encounter.              Passed - Medication is active on med list        Passed - Patient is age 18 or older        Passed - No active pregancy on record        Passed - Normal serum creatinine on file in past 12 months     Recent Labs   Lab Test 08/12/20 0918   CR 0.77              Passed - Normal serum potassium on file in past 12 months     Recent Labs   Lab Test 08/12/20 0918   POTASSIUM 3.8                    Passed - No positive pregnancy test in past 12 months           losartan (COZAAR) 50 MG tablet [Pharmacy Med Name: LOSARTAN POTASSIUM 50MG TABS] 135 tablet 0     Sig: TAKE 1 AND 1/2 TABLETS BY MOUTH ONCE DAILY       Angiotensin-II Receptors Failed - 12/27/2020 12:09 PM        Failed - Last blood pressure under 140/90 in past 12 months     BP Readings from Last 3 Encounters:   09/22/20 (!) 165/82   08/21/20 138/66   07/08/19 126/72                 Passed - Recent (12 mo) or future (30 days) visit within the authorizing provider's specialty     Patient has " "had an office visit with the authorizing provider or a provider within the authorizing providers department within the previous 12 mos or has a future within next 30 days. See \"Patient Info\" tab in inbasket, or \"Choose Columns\" in Meds & Orders section of the refill encounter.              Passed - Medication is active on med list        Passed - Patient is age 18 or older        Passed - No active pregnancy on record        Passed - Normal serum creatinine on file in past 12 months     Recent Labs   Lab Test 08/12/20 0918   CR 0.77       Ok to refill medication if creatinine is low          Passed - Normal serum potassium on file in past 12 months     Recent Labs   Lab Test 08/12/20  0918   POTASSIUM 3.8                    Passed - No positive pregnancy test in past 12 months             "

## 2020-12-29 NOTE — TELEPHONE ENCOUNTER
"Patient comment: I had my annual visit August 21, 2020 but my prescriptions weren't renewed for a full year. Please send through the prescription. Thanks!  Requested Prescriptions   Pending Prescriptions Disp Refills     losartan (COZAAR) 50 MG tablet 135 tablet 0       Angiotensin-II Receptors Failed - 12/27/2020 12:41 PM        Failed - Last blood pressure under 140/90 in past 12 months     BP Readings from Last 3 Encounters:   09/22/20 (!) 165/82   08/21/20 138/66   07/08/19 126/72                 Passed - Recent (12 mo) or future (30 days) visit within the authorizing provider's specialty     Patient has had an office visit with the authorizing provider or a provider within the authorizing providers department within the previous 12 mos or has a future within next 30 days. See \"Patient Info\" tab in inbasket, or \"Choose Columns\" in Meds & Orders section of the refill encounter.              Passed - Medication is active on med list        Passed - Patient is age 18 or older        Passed - No active pregnancy on record        Passed - Normal serum creatinine on file in past 12 months     Recent Labs   Lab Test 08/12/20  0918   CR 0.77       Ok to refill medication if creatinine is low          Passed - Normal serum potassium on file in past 12 months     Recent Labs   Lab Test 08/12/20  0918   POTASSIUM 3.8                    Passed - No positive pregnancy test in past 12 months           hydrochlorothiazide (HYDRODIURIL) 25 MG tablet 90 tablet 0     Sig: Take 1 tablet (25 mg) by mouth daily       Diuretics (Including Combos) Protocol Failed - 12/27/2020 12:41 PM        Failed - Blood pressure under 140/90 in past 12 months     BP Readings from Last 3 Encounters:   09/22/20 (!) 165/82   08/21/20 138/66   07/08/19 126/72                 Failed - Normal serum sodium on file in past 12 months     Recent Labs   Lab Test 08/12/20  0918   *              Passed - Recent (12 mo) or future (30 days) visit within the " "authorizing provider's specialty     Patient has had an office visit with the authorizing provider or a provider within the authorizing providers department within the previous 12 mos or has a future within next 30 days. See \"Patient Info\" tab in inbasket, or \"Choose Columns\" in Meds & Orders section of the refill encounter.              Passed - Medication is active on med list        Passed - Patient is age 18 or older        Passed - No active pregancy on record        Passed - Normal serum creatinine on file in past 12 months     Recent Labs   Lab Test 08/12/20  0918   CR 0.77              Passed - Normal serum potassium on file in past 12 months     Recent Labs   Lab Test 08/12/20  0918   POTASSIUM 3.8                    Passed - No positive pregnancy test in past 12 months             "

## 2020-12-30 RX ORDER — HYDROCHLOROTHIAZIDE 25 MG/1
TABLET ORAL
Qty: 90 TABLET | Refills: 0 | OUTPATIENT
Start: 2020-12-30

## 2020-12-30 RX ORDER — LOSARTAN POTASSIUM 50 MG/1
TABLET ORAL
Qty: 135 TABLET | Refills: 0 | OUTPATIENT
Start: 2020-12-30

## 2020-12-30 RX ORDER — HYDROCHLOROTHIAZIDE 25 MG/1
25 TABLET ORAL DAILY
Qty: 90 TABLET | Refills: 3 | Status: SHIPPED | OUTPATIENT
Start: 2020-12-30 | End: 2021-06-07 | Stop reason: DRUGHIGH

## 2020-12-30 RX ORDER — LOSARTAN POTASSIUM 50 MG/1
75 TABLET ORAL DAILY
Qty: 135 TABLET | Refills: 3 | Status: SHIPPED | OUTPATIENT
Start: 2020-12-30 | End: 2021-03-31

## 2021-02-25 ENCOUNTER — TELEPHONE (OUTPATIENT)
Dept: FAMILY MEDICINE | Facility: CLINIC | Age: 71
End: 2021-02-25

## 2021-02-25 NOTE — LETTER
Arabella Rodríguez  [address]    [facility] [id number]          03/25/21      Dear Arabella Rodríguez        Your most recent blood pressure reading preformed at our clinic was higher than we like to see it. The goal is to have it under 140/90 in clinic.    Please call our clinic [phone] (option 2) to schedule a blood pressure recheck with our RN staff. This appointment is free of charges and it takes about 15 minutes to be complete.     Be sure to take all of your blood pressure medications, and avoid stimulants like caffeine, cold medicines, sudafed, or tobacco prior to your recheck.    If your blood pressure medication was changed by your provider recently wait 2 weeks before making this recheck appointment.     Thank you for trusting us with your health care.    Sincerely,        [facility]/Quality team

## 2021-02-25 NOTE — TELEPHONE ENCOUNTER
Patient Quality Outreach Summary      Summary:    Patient is due/failing the following:   BP check    Type of outreach:    Sent [facility].    Questions for provider review:    None                                                                                                                    Soledad Gutiérrez CMA ([facility])   (aka: Suzie Gutiérrez)       Chart routed to Care Team.

## 2021-03-12 NOTE — TELEPHONE ENCOUNTER
(1st attempt) Left a voice msg for pt to call back.  Whenever she calls back schedule BP recheck with RN.  Soledad Gutiérrez CMA ([facility])   (aka: Suzie Gutiérrez)

## 2021-03-23 ENCOUNTER — ALLIED HEALTH/NURSE VISIT (OUTPATIENT)
Dept: FAMILY MEDICINE | Facility: CLINIC | Age: 71
End: 2021-03-23
Payer: COMMERCIAL

## 2021-03-23 VITALS — DIASTOLIC BLOOD PRESSURE: 68 MMHG | SYSTOLIC BLOOD PRESSURE: 145 MMHG

## 2021-03-23 DIAGNOSIS — I10 ESSENTIAL HYPERTENSION: Primary | ICD-10-CM

## 2021-03-23 PROCEDURE — 99207 PR NO CHARGE NURSE ONLY: CPT

## 2021-03-23 NOTE — TELEPHONE ENCOUNTER
(2nd attempt) Left a voice msg for pt to call back.  Whenever she calls back schedule BP recheck with RN.  Soledad Gutiérrez CMA ([facility])   (aka: Suzie Gutiérrez)

## 2021-03-30 ENCOUNTER — ALLIED HEALTH/NURSE VISIT (OUTPATIENT)
Dept: FAMILY MEDICINE | Facility: CLINIC | Age: 71
End: 2021-03-30
Payer: COMMERCIAL

## 2021-03-30 ENCOUNTER — TELEPHONE (OUTPATIENT)
Dept: FAMILY MEDICINE | Facility: CLINIC | Age: 71
End: 2021-03-30

## 2021-03-30 VITALS — DIASTOLIC BLOOD PRESSURE: 66 MMHG | HEART RATE: 76 BPM | SYSTOLIC BLOOD PRESSURE: 146 MMHG | OXYGEN SATURATION: 99 %

## 2021-03-30 DIAGNOSIS — I10 ESSENTIAL HYPERTENSION: Primary | ICD-10-CM

## 2021-03-30 DIAGNOSIS — I10 ESSENTIAL HYPERTENSION: ICD-10-CM

## 2021-03-30 PROCEDURE — 99207 PR NO CHARGE NURSE ONLY: CPT

## 2021-03-31 RX ORDER — LOSARTAN POTASSIUM 50 MG/1
50 TABLET ORAL 2 TIMES DAILY
Qty: 180 TABLET | Refills: 3 | Status: SHIPPED | OUTPATIENT
Start: 2021-03-31 | End: 2021-06-07 | Stop reason: DRUGHIGH

## 2021-03-31 NOTE — TELEPHONE ENCOUNTER
Unfortunately BP still not under good control, recommend to increase Cozaar to 100 mg, take 50 mg twice daily and follow up with me in 2-3 weeks for another recheck      THOMAS Butler CNP

## 2021-04-28 ENCOUNTER — OFFICE VISIT (OUTPATIENT)
Dept: FAMILY MEDICINE | Facility: CLINIC | Age: 71
End: 2021-04-28
Payer: COMMERCIAL

## 2021-04-28 VITALS
BODY MASS INDEX: 22.74 KG/M2 | OXYGEN SATURATION: 99 % | SYSTOLIC BLOOD PRESSURE: 142 MMHG | HEART RATE: 79 BPM | TEMPERATURE: 97.4 F | DIASTOLIC BLOOD PRESSURE: 72 MMHG | WEIGHT: 123.6 LBS | HEIGHT: 62 IN | RESPIRATION RATE: 16 BRPM

## 2021-04-28 DIAGNOSIS — I10 BENIGN ESSENTIAL HYPERTENSION: Primary | ICD-10-CM

## 2021-04-28 PROCEDURE — 99213 OFFICE O/P EST LOW 20 MIN: CPT | Performed by: NURSE PRACTITIONER

## 2021-04-28 ASSESSMENT — MIFFLIN-ST. JEOR: SCORE: 1030.96

## 2021-04-28 NOTE — PATIENT INSTRUCTIONS
Please send me my chart message with your two weeks BP home readings     We might need to start another BP medication: Norvasc 2.5 mg daily

## 2021-04-28 NOTE — PROGRESS NOTES
"    Assessment & Plan     Benign essential hypertension  -BP still not under control, BP borderline high  -recommended patient to monitor BP at home for 2 weeks, send me home BP readings through my chart, if still over 140/90 on average will plan to add Norvasc 2.5 mg daily  -continue Cozaar 50 mg twice daily   -continue hydrochlorothiazide  25 mg daily   - Basic metabolic panel  (Ca, Cl, CO2, Creat, Gluc, K, Na, BUN)      THOMAS Butler CNP  [facility]    Chris Hanley is a 70 year old who presents for the following health issues      Chief Complaint   Patient presents with     Hypertension     is here to follow up on her B/P- Losartan was recently upped to 2 tablets daily      HPI     Hypertension Follow-up      Do you check your blood pressure regularly outside of the clinic? No     Are you following a low salt diet? Yes    Are your blood pressures ever more than 140 on the top number (systolic) OR more   than 90 on the bottom number (diastolic), for example 140/90? Yes      Review of Systems   Constitutional, HEENT, cardiovascular, pulmonary, gi and gu systems are negative, except as otherwise noted.      Objective    BP (!) 142/72 (BP Location: Right arm, Patient Position: Sitting, Cuff Size: Adult Regular)   Pulse 79   Temp 97.4  F (36.3  C) (Tympanic)   Resp 16   Ht 1.562 m (5' 1.5\")   Wt 56.1 kg (123 lb 9.6 oz)   SpO2 99%   BMI 22.98 kg/m    Body mass index is 22.98 kg/m .  Physical Exam   GENERAL: healthy, alert and no distress  EYES: Eyes grossly normal to inspection, PERRL and conjunctivae and sclerae normal  RESP: lungs clear to auscultation - no rales, rhonchi or wheezes  CV: regular rate and rhythm, normal S1 S2, no S3 or S4, no murmur, click or rub, no peripheral edema and peripheral pulses strong  NEURO: Normal strength and tone, mentation intact and speech normal  PSYCH: mentation appears normal, affect normal/bright                "

## 2021-04-29 ENCOUNTER — MYC MEDICAL ADVICE (OUTPATIENT)
Dept: FAMILY MEDICINE | Facility: CLINIC | Age: 71
End: 2021-04-29

## 2021-04-29 DIAGNOSIS — G47.00 INSOMNIA, UNSPECIFIED TYPE: Primary | ICD-10-CM

## 2021-04-29 DIAGNOSIS — I10 BENIGN ESSENTIAL HYPERTENSION: Primary | ICD-10-CM

## 2021-04-29 DIAGNOSIS — I10 BENIGN ESSENTIAL HYPERTENSION: ICD-10-CM

## 2021-04-29 LAB
ANION GAP SERPL CALCULATED.3IONS-SCNC: 5 MMOL/L (ref 3–14)
BUN SERPL-MCNC: 12 MG/DL (ref 7–30)
CALCIUM SERPL-MCNC: 9.1 MG/DL (ref 8.5–10.1)
CHLORIDE SERPL-SCNC: 98 MMOL/L (ref 94–109)
CO2 SERPL-SCNC: 29 MMOL/L (ref 20–32)
CREAT SERPL-MCNC: 0.84 MG/DL (ref 0.52–1.04)
GFR SERPL CREATININE-BSD FRML MDRD: 71 ML/MIN/{1.73_M2}
GLUCOSE SERPL-MCNC: 100 MG/DL (ref 70–99)
POTASSIUM SERPL-SCNC: 4 MMOL/L (ref 3.4–5.3)
SODIUM SERPL-SCNC: 132 MMOL/L (ref 133–144)

## 2021-04-29 PROCEDURE — 80048 BASIC METABOLIC PNL TOTAL CA: CPT | Performed by: NURSE PRACTITIONER

## 2021-04-29 PROCEDURE — 36415 COLL VENOUS BLD VENIPUNCTURE: CPT | Performed by: NURSE PRACTITIONER

## 2021-04-29 NOTE — TELEPHONE ENCOUNTER
Gloria,    Patient sends my chart message with experiencing side effect for losartan.  Please advise. Josefina PARRA RN

## 2021-04-30 RX ORDER — HYDROXYZINE PAMOATE 25 MG/1
25 CAPSULE ORAL
Qty: 30 CAPSULE | Refills: 1 | Status: SHIPPED | OUTPATIENT
Start: 2021-04-30 | End: 2021-05-25

## 2021-04-30 NOTE — TELEPHONE ENCOUNTER
Patient has responded to your [employer] [facility].    Routing to provider.  Karen SALAS MSN, RN

## 2021-05-13 ENCOUNTER — MYC MEDICAL ADVICE (OUTPATIENT)
Dept: FAMILY MEDICINE | Facility: CLINIC | Age: 71
End: 2021-05-13

## 2021-05-13 VITALS — SYSTOLIC BLOOD PRESSURE: 132 MMHG | DIASTOLIC BLOOD PRESSURE: 64 MMHG

## 2021-05-13 DIAGNOSIS — B00.1 COLD SORE: ICD-10-CM

## 2021-05-13 RX ORDER — VALACYCLOVIR HYDROCHLORIDE 1 G/1
TABLET, FILM COATED ORAL
Qty: 30 TABLET | Refills: 1 | Status: SHIPPED | OUTPATIENT
Start: 2021-05-13 | End: 2023-09-06

## 2021-05-13 NOTE — TELEPHONE ENCOUNTER
Routing refill request to provider for review/approval because:  Last ordered 12/18/2019 by Dr. Cortes JOSE, RN, BSN

## 2021-05-22 DIAGNOSIS — G47.00 INSOMNIA, UNSPECIFIED TYPE: ICD-10-CM

## 2021-05-25 RX ORDER — HYDROXYZINE PAMOATE 25 MG/1
25 CAPSULE ORAL
Qty: 30 CAPSULE | Refills: 1 | Status: SHIPPED | OUTPATIENT
Start: 2021-05-25 | End: 2022-08-31

## 2021-06-05 ENCOUNTER — TELEPHONE (OUTPATIENT)
Dept: FAMILY MEDICINE | Facility: CLINIC | Age: 71
End: 2021-06-05

## 2021-06-05 DIAGNOSIS — I10 ESSENTIAL HYPERTENSION: ICD-10-CM

## 2021-06-05 DIAGNOSIS — I10 BENIGN ESSENTIAL HYPERTENSION: Primary | ICD-10-CM

## 2021-06-07 RX ORDER — LOSARTAN POTASSIUM AND HYDROCHLOROTHIAZIDE 25; 100 MG/1; MG/1
1 TABLET ORAL DAILY
Qty: 90 TABLET | Refills: 1 | Status: SHIPPED | OUTPATIENT
Start: 2021-06-07 | End: 2021-08-27

## 2021-06-07 RX ORDER — LOSARTAN POTASSIUM 50 MG/1
50 TABLET ORAL 2 TIMES DAILY
Qty: 180 TABLET | Refills: 3 | OUTPATIENT
Start: 2021-06-07

## 2021-06-07 NOTE — TELEPHONE ENCOUNTER
Message from pharmacy: Medication Question (Can pharmacy actually get script sent over for combination of losartan/hctz 100/25mg sent over? (patient won't have to take so many tabs then))

## 2021-06-07 NOTE — TELEPHONE ENCOUNTER
Message from pharmacy: Medication Question (Medication direction change/refill request (patient says it's now 2 tablets daily))

## 2021-06-07 NOTE — TELEPHONE ENCOUNTER
Please advise patient I did refill BP medication, changed Cozaar and hydrochlorothiazide to Hyzaar, she can take only one tablet daily morning now (will be same amount of medications just in one pill)    THOMAS Butler CNP

## 2021-06-08 DIAGNOSIS — G47.00 INSOMNIA, UNSPECIFIED TYPE: ICD-10-CM

## 2021-06-09 RX ORDER — HYDROXYZINE PAMOATE 25 MG/1
CAPSULE ORAL
Qty: 90 CAPSULE | Refills: 1 | OUTPATIENT
Start: 2021-06-09

## 2021-07-15 ENCOUNTER — MYC MEDICAL ADVICE (OUTPATIENT)
Dept: FAMILY MEDICINE | Facility: CLINIC | Age: 71
End: 2021-07-15

## 2021-07-16 ENCOUNTER — MYC MEDICAL ADVICE (OUTPATIENT)
Dept: FAMILY MEDICINE | Facility: CLINIC | Age: 71
End: 2021-07-16

## 2021-07-16 ENCOUNTER — MYC MEDICAL ADVICE (OUTPATIENT)
Dept: ORTHOPEDICS | Facility: CLINIC | Age: 71
End: 2021-07-16

## 2021-07-16 DIAGNOSIS — E55.9 VITAMIN D DEFICIENCY: ICD-10-CM

## 2021-07-16 DIAGNOSIS — Z13.220 LIPID SCREENING: ICD-10-CM

## 2021-07-16 DIAGNOSIS — M25.551 RIGHT HIP PAIN: Primary | ICD-10-CM

## 2021-07-16 DIAGNOSIS — R53.83 FATIGUE, UNSPECIFIED TYPE: Primary | ICD-10-CM

## 2021-08-09 ENCOUNTER — LAB (OUTPATIENT)
Dept: LAB | Facility: CLINIC | Age: 71
End: 2021-08-09
Payer: COMMERCIAL

## 2021-08-09 ENCOUNTER — HOSPITAL ENCOUNTER (OUTPATIENT)
Dept: PHYSICAL THERAPY | Facility: CLINIC | Age: 71
Setting detail: THERAPIES SERIES
End: 2021-08-09
Attending: FAMILY MEDICINE
Payer: COMMERCIAL

## 2021-08-09 DIAGNOSIS — M25.551 RIGHT HIP PAIN: ICD-10-CM

## 2021-08-09 DIAGNOSIS — Z13.220 LIPID SCREENING: ICD-10-CM

## 2021-08-09 DIAGNOSIS — I10 BENIGN ESSENTIAL HYPERTENSION: ICD-10-CM

## 2021-08-09 DIAGNOSIS — E55.9 VITAMIN D DEFICIENCY: ICD-10-CM

## 2021-08-09 DIAGNOSIS — R53.83 FATIGUE, UNSPECIFIED TYPE: ICD-10-CM

## 2021-08-09 LAB
ANION GAP SERPL CALCULATED.3IONS-SCNC: 8 MMOL/L (ref 3–14)
BUN SERPL-MCNC: 15 MG/DL (ref 7–30)
CALCIUM SERPL-MCNC: 9.6 MG/DL (ref 8.5–10.1)
CHLORIDE BLD-SCNC: 97 MMOL/L (ref 94–109)
CHOLEST SERPL-MCNC: 290 MG/DL
CO2 SERPL-SCNC: 27 MMOL/L (ref 20–32)
CREAT SERPL-MCNC: 0.84 MG/DL (ref 0.52–1.04)
DEPRECATED CALCIDIOL+CALCIFEROL SERPL-MC: 65 UG/L (ref 20–75)
FASTING STATUS PATIENT QL REPORTED: YES
GFR SERPL CREATININE-BSD FRML MDRD: 71 ML/MIN/1.73M2
GLUCOSE BLD-MCNC: 92 MG/DL (ref 70–99)
HDLC SERPL-MCNC: 69 MG/DL
LDLC SERPL CALC-MCNC: 189 MG/DL
NONHDLC SERPL-MCNC: 221 MG/DL
POTASSIUM BLD-SCNC: 3.7 MMOL/L (ref 3.4–5.3)
SODIUM SERPL-SCNC: 132 MMOL/L (ref 133–144)
TRIGL SERPL-MCNC: 159 MG/DL
TSH SERPL DL<=0.005 MIU/L-ACNC: 2.76 MU/L (ref 0.4–4)

## 2021-08-09 PROCEDURE — 80048 BASIC METABOLIC PNL TOTAL CA: CPT

## 2021-08-09 PROCEDURE — 80061 LIPID PANEL: CPT

## 2021-08-09 PROCEDURE — 97110 THERAPEUTIC EXERCISES: CPT | Mod: GP | Performed by: PHYSICAL THERAPIST

## 2021-08-09 PROCEDURE — 84443 ASSAY THYROID STIM HORMONE: CPT

## 2021-08-09 PROCEDURE — 97161 PT EVAL LOW COMPLEX 20 MIN: CPT | Mod: GP | Performed by: PHYSICAL THERAPIST

## 2021-08-09 PROCEDURE — 36415 COLL VENOUS BLD VENIPUNCTURE: CPT

## 2021-08-09 PROCEDURE — 82306 VITAMIN D 25 HYDROXY: CPT

## 2021-08-09 NOTE — PROGRESS NOTES
08/09/21 0900   General Information   Type of Visit Initial OP Ortho PT Evaluation   Start of Care Date 08/09/21   Referring Physician Raúl   Patient/Family Goals Statement to re check HEP from Tank, to check form    Orders Evaluate and Treat   Date of Order 07/29/21   Certification Required? Yes   Medical Diagnosis right hip pain   Surgical/Medical history reviewed Yes   Body Part(s)   Body Part(s) Hip   Presentation and Etiology   Pertinent history of current problem (include personal factors and/or comorbidities that impact the POC) Pt reports: she attended PT in 2019 to address her right hip pain.  Symptoms really improved, looking to review these exercises as some of the pain and weakness has returned.  Pt feels the pain only at night along her posterolateral hip.  It can travel down her leg (mid thigh) which is different. Pt is a side sleeper.   Impairments A. Pain   Functional Limitations perform activities of daily living;perform required work activities;perform desired leisure / sports activities   Symptom Location right piriformis with some radiating pain to mid thigh (posterior)   How/Where did it occur From insidious onset   Onset date of current episode/exacerbation 06/09/21   Chronicity Recurrent   Pain rating (0-10 point scale) Worst (/10);Best (/10)   Best (/10) 0   Worst (/10) 7   Pain quality C. Aching   Frequency of pain/symptoms B. Intermittent   Pain/symptoms are: Worse during the night   Pain/symptoms exacerbated by E. Rest   Pain/symptoms eased by B. Walking;E. Changing positions;F. Certain positions   Progression of symptoms since onset: Worsened   Current / Previous Interventions   Diagnostic Tests: Other   Other diagnostic tests dexa osteopenia janet hips and lumbar   Prior Level of Function   Prior Level of Function-Mobility independent   Prior Level of Function-ADLs independent   Current Level of Function   Current Community Support Family/friend caregiver   Patient role/employment  history F. Retired   Living environment [facility]   Fall Risk Screen   Fall screen completed by PT   Have you fallen 2 or more times in the past year? No   Have you fallen and had an injury in the past year? No   Is patient a fall risk? No   Abuse Screen (yes response referral indicated)   Feels Unsafe at Home or Work/School no   Feels Threatened by Someone no   Does Anyone Try to Keep You From Having Contact with Others or Doing Things Outside Your Home? no   Physical Signs of Abuse Present no   Hip Objective Findings   Side (if bilateral, select both right and left) Right   Gait/Locomotion janet trendelenburg   Pelvic Screen (-)   Neurological Testing Comments sciatic N tension test (supine 90-90) right=lacking 30 deg; left=lacking 15 deg   Right Hip Flexion PROM 100; left=110 deg   Right Hip ER PROM 30 left=45 deg   Right Hip IR PROM 20 left=30 deg   Right Hip Abduction Strength janet=4/5   Right Hip Extension Strength janet=4/5   Right Piriformis Flexibility poor   Planned Therapy Interventions   Planned Therapy Interventions ADL retraining;balance training;gait training;joint mobilization;manual therapy;motor coordination training;neuromuscular re-education;ROM;strengthening;stretching   Clinical Impression   Criteria for Skilled Therapeutic Interventions Met yes, treatment indicated   PT Diagnosis right hip pain secondary to piriformis hypertonicity with some sciatic N compression   Influenced by the following impairments pain; weakness; ROM loss; impaired squat form   Functional limitations due to impairments difficulty sleeping   Clinical Presentation Stable/Uncomplicated   Clinical Presentation Rationale (+) motivation; overall health; previous success with PT    Clinical Decision Making (Complexity) Low complexity   Therapy Frequency other (see comments)   Predicted Duration of Therapy Intervention (days/wks) 1x every 3 -4 weeks - up to 12 weeks   Risk & Benefits of therapy have been explained Yes    Patient, Family & other staff in agreement with plan of care Yes   Clinical Impression Comments Dayan arrives today with recurrent right posterior hip pain presenting as piriformis tightness and mild sciatic N compression; additionally gluteal and core weakness.  She will benefit from skilled PT to address the above impairments and functional limitations.   Education Assessment   Preferred Learning Style Listening;Demonstration;Pictures/video   Barriers to Learning No barriers   ORTHO GOALS   PT Ortho Eval Goals 1;2;3   Ortho Goal 1   Goal Description Patient will no longer wake at night due to right hip pain.   Target Date 11/01/21   Ortho Goal 2   Goal Description Patient will have 5/5 right hip ABD strength to stabilize hip and pelvis with workout routine.   Target Date 11/01/21   Ortho Goal 3   Goal Description Patient will be independent with her HEP to reduce future occurrence of pain and disability.   Target Date 08/09/21   Date Met 08/09/21   Total Evaluation Time   PT Eval, Low Complexity Minutes (86193) 15   Therapy Certification   Certification date from 08/09/21   Certification date to 11/01/21   Medical Diagnosis right hip pain       María Vargas, PT, DTP, SCS  Doctor of Physical Therapy #[id number]  [facility]  [phone]  [email or web address]

## 2021-08-09 NOTE — PROGRESS NOTES
[facility]          OUTPATIENT PHYSICAL THERAPY ORTHOPEDIC EVALUATION  PLAN OF TREATMENT FOR OUTPATIENT REHABILITATION  (COMPLETE FOR INITIAL CLAIMS ONLY)  Patient's Last Name, First Name, M.I.  YOB: 1950  Arabella Rodríguez    Provider s Name:  [facility]   Medical Record No.  [id number]   Start of Care Date:  08/09/21   Onset Date:  06/09/21   Type:     _X__PT   ___OT   ___SLP Medical Diagnosis:  right hip pain     PT Diagnosis:  right hip pain secondary to piriformis hypertonicity with some sciatic N compression   Visits from SOC:  1      _________________________________________________________________________________  Plan of Treatment/Functional Goals:  ADL retraining, balance training, gait training, joint mobilization, manual therapy, motor coordination training, neuromuscular re-education, ROM, strengthening, stretching           Goals     Goal Description: Patient will no longer wake at night due to right hip pain.  Target Date: 11/01/21       Goal Description: Patient will have 5/5 right hip ABD strength to stabilize hip and pelvis with workout routine.  Target Date: 11/01/21       Goal Description: Patient will be independent with her HEP to reduce future occurrence of pain and disability.  Target Date: 08/09/21                      Therapy Frequency:  other (see comments)  Predicted Duration of Therapy Intervention:  1x every 3 -4 weeks - up to 12 weeks    María Vargas, PT                 I CERTIFY THE NEED FOR THESE SERVICES FURNISHED UNDER        THIS PLAN OF TREATMENT AND WHILE UNDER MY CARE     (Physician co-signature of this document indicates review and certification of the therapy plan).                       Certification Date From:  08/09/21   Certification Date To:  11/01/21    Referring Provider:  Raúl    Initial Assessment        See Epic Evaluation Start of Care Date: 08/09/21

## 2021-08-18 ENCOUNTER — MYC MEDICAL ADVICE (OUTPATIENT)
Dept: FAMILY MEDICINE | Facility: CLINIC | Age: 71
End: 2021-08-18

## 2021-08-27 ENCOUNTER — HOSPITAL ENCOUNTER (OUTPATIENT)
Dept: MAMMOGRAPHY | Facility: CLINIC | Age: 71
Discharge: HOME | End: 2021-08-27
Attending: NURSE PRACTITIONER | Admitting: NURSE PRACTITIONER
Payer: COMMERCIAL

## 2021-08-27 ENCOUNTER — OFFICE VISIT (OUTPATIENT)
Dept: FAMILY MEDICINE | Facility: CLINIC | Age: 71
End: 2021-08-27
Payer: COMMERCIAL

## 2021-08-27 VITALS
HEIGHT: 62 IN | HEART RATE: 77 BPM | OXYGEN SATURATION: 97 % | WEIGHT: 122.5 LBS | TEMPERATURE: 97.4 F | SYSTOLIC BLOOD PRESSURE: 126 MMHG | DIASTOLIC BLOOD PRESSURE: 64 MMHG | BODY MASS INDEX: 22.54 KG/M2

## 2021-08-27 DIAGNOSIS — I10 BENIGN ESSENTIAL HYPERTENSION: ICD-10-CM

## 2021-08-27 DIAGNOSIS — Z12.31 VISIT FOR SCREENING MAMMOGRAM: ICD-10-CM

## 2021-08-27 DIAGNOSIS — E04.1 THYROID NODULE: ICD-10-CM

## 2021-08-27 DIAGNOSIS — Z00.00 ANNUAL PHYSICAL EXAM: Primary | ICD-10-CM

## 2021-08-27 DIAGNOSIS — E78.5 HYPERLIPIDEMIA LDL GOAL <100: ICD-10-CM

## 2021-08-27 PROCEDURE — 99214 OFFICE O/P EST MOD 30 MIN: CPT | Mod: 25 | Performed by: NURSE PRACTITIONER

## 2021-08-27 PROCEDURE — 77063 BREAST TOMOSYNTHESIS BI: CPT

## 2021-08-27 PROCEDURE — 99397 PER PM REEVAL EST PAT 65+ YR: CPT | Performed by: NURSE PRACTITIONER

## 2021-08-27 RX ORDER — LOSARTAN POTASSIUM AND HYDROCHLOROTHIAZIDE 25; 100 MG/1; MG/1
1 TABLET ORAL DAILY
Qty: 90 TABLET | Refills: 3 | Status: SHIPPED | OUTPATIENT
Start: 2021-08-27 | End: 2022-08-31

## 2021-08-27 ASSESSMENT — MIFFLIN-ST. JEOR: SCORE: 1020.97

## 2021-08-27 ASSESSMENT — ACTIVITIES OF DAILY LIVING (ADL): CURRENT_FUNCTION: NO ASSISTANCE NEEDED

## 2021-08-27 NOTE — PROGRESS NOTES
SUBJECTIVE:   Arabella Rodríguez is a 71 year old female who presents for Preventive Visit.    Chief Complaint   Patient presents with     Physical       Patient has been advised of split billing requirements and indicates understanding: Yes   Are you in the first 12 months of your Medicare coverage?  Yes,  Healthy Habits:    In general, how would you rate your overall health?  Very good    Frequency of exercise:  4-5 days/week    Duration of exercise:  30-45 minutes    Taking medications regularly:  Yes    Barriers to taking medications:  None    Medication side effects:  None    Ability to successfully perform activities of daily living:  No assistance needed    Home Safety:  No safety concerns identified    Hearing Impairment:  No hearing concerns    In the past 6 months, have you been bothered by leaking of urine?  No    In general, how would you rate your overall mental or emotional health?  Good      PHQ-2 Total Score:    Additional concerns today:  Yes (Would like to discuss her labs- Cholesterol was high. )    Do you feel safe in your environment? Yes    Have you ever done Advance Care Planning? (For example, a Health Directive, POLST, or a discussion with a medical provider or your loved ones about your wishes): Yes, advance care planning is on file.      Fall risk  Fallen 2 or more times in the past year?: No  Any fall with injury in the past year?: No  click delete button to remove this line now  Cognitive Screening   1) Repeat 3 items (Leader, Season, Table)    2) Clock draw: ABNORMAL   3) 3 item recall: Recalls 3 objects  Results: 3 items recalled: COGNITIVE IMPAIRMENT LESS LIKELY    Mini-CogTM Copyright ELSI Blake. Licensed by the author for use in [facility]; reprinted with permission (patrick@.[facility]). All rights reserved.      Do you have sleep apnea, excessive snoring or daytime drowsiness?: no    Reviewed and updated as needed this visit by clinical staff  Tobacco  Allergies  Meds   Med  Hx  Surg Hx  Fam Hx  Soc Hx        Reviewed and updated as needed this visit by Provider                Social History     Tobacco Use     Smoking status: Former Smoker     Packs/day: 0.50     Years: 9.00     Pack years: 4.50     Types: Cigarettes     Start date: 10/1/1972     Quit date: 1981     Years since quittin.3     Smokeless tobacco: Never Used     Tobacco comment: I quit in    Substance Use Topics     Alcohol use: Yes     Comment: 1 o 2 then switch  to na     If you drink alcohol do you typically have >3 drinks per day or >7 drinks per week? No    Alcohol Use 2021   Prescreen: >3 drinks/day or >7 drinks/week? -   Prescreen: >3 drinks/day or >7 drinks/week? No     Hypertension Follow-up      Do you check your blood pressure regularly outside of the clinic? Yes     Are you following a low salt diet? Yes    Are your blood pressures ever more than 140 on the top number (systolic) OR more   than 90 on the bottom number (diastolic), for example 140/90? No      Current providers sharing in care for this patient include: Patient Care Team:  Gloria Escobedo APRN CNP as PCP - General (Nurse Practitioner - Gerontology)  Sherice Estrada MD as MD (INTERNAL MEDICINE - ENDOCRINOLOGY, DIABETES & METABOLISM)  Gloria Escobedo APRN CNP as Assigned PCP  Elkin Hooks MD as Assigned Surgical Provider  Sherice Estrada MD as Assigned Endocrinology Provider    The following health maintenance items are reviewed in Epic and correct as of today:  Health Maintenance Due   Topic Date Due     ANNUAL REVIEW OF HM ORDERS  Never done     FALL RISK ASSESSMENT  2021     INFLUENZA VACCINE (1) 2021       Review of Systems  Constitutional, HEENT, cardiovascular, pulmonary, gi and gu systems are negative, except as otherwise noted.    OBJECTIVE:   /64 (BP Location: Right arm, Patient Position: Sitting, Cuff Size: Adult Regular)   Pulse 77   Temp 97.4  F (36.3  C)  "(Tympanic)   Ht 1.562 m (5' 1.5\")   Wt 55.6 kg (122 lb 8 oz)   SpO2 97%   BMI 22.77 kg/m   Estimated body mass index is 22.77 kg/m  as calculated from the following:    Height as of this encounter: 1.562 m (5' 1.5\").    Weight as of this encounter: 55.6 kg (122 lb 8 oz).  Physical Exam  GENERAL: healthy, alert and no distress  EYES: Eyes grossly normal to inspection, PERRL and conjunctivae and sclerae normal  HENT: ear canals and TM's normal, nose and mouth without ulcers or lesions  NECK: no adenopathy and slightly enlarged right side thyroid.   RESP: lungs clear to auscultation - no rales, rhonchi or wheezes  CV: regular rate and rhythm, normal S1 S2, no S3 or S4, no murmur, click or rub, no peripheral edema and peripheral pulses strong  MS: no gross musculoskeletal defects noted, no edema  SKIN: no suspicious lesions or rashes  NEURO: Normal strength and tone, mentation intact and speech normal  PSYCH: mentation appears normal, affect normal/bright        ASSESSMENT / PLAN:   (Z00.00) Annual physical exam  (primary encounter diagnosis)  Comment: exam normal  Plan: Lipid panel reflex to direct LDL Fasting, repeat in 3 months             (I10) Benign essential hypertension  Comment: well controlled   Plan: losartan-hydrochlorothiazide (HYZAAR) 100-25 MG        tablet, Lipid panel reflex to direct LDL         Fasting        *    (E04.1) Thyroid nodule  Comment: repeat thyroid US in 1 year  Plan: US Thyroid            (E78.5) Hyperlipidemia LDL goal <100  Comment: did not tolerate statin medications in the past including Pravachol, developed myalgia-pain in bilateral shoulders and arms, states pain resolved after she stopped Pravachol, was taking 10-20 mg daily  -will try diet changes again, if cholesterol level still high in 3-6 months will plan low dose Simvastatin, or Pravachol 5 mg every other day, hopefully will be able to tolerate   Plan: Lipid panel reflex to direct LDL Fasting              Patient has " "been advised of split billing requirements and indicates understanding: Yes  COUNSELING:  Reviewed preventive health counseling, as reflected in patient instructions       Regular exercise       Healthy diet/nutrition       Osteoporosis prevention/bone health    Estimated body mass index is 22.77 kg/m  as calculated from the following:    Height as of this encounter: 1.562 m (5' 1.5\").    Weight as of this encounter: 55.6 kg (122 lb 8 oz).        She reports that she quit smoking about 40 years ago. Her smoking use included cigarettes. She started smoking about 48 years ago. She has a 4.50 pack-year smoking history. She has never used smokeless tobacco.      Appropriate preventive services were discussed with this patient, including applicable screening as appropriate for cardiovascular disease, diabetes, osteopenia/osteoporosis, and glaucoma.  As appropriate for age/gender, discussed screening for colorectal cancer, prostate cancer, breast cancer, and cervical cancer. Checklist reviewing preventive services available has been given to the patient.    Reviewed patients plan of care and provided an AVS. The Basic Care Plan (routine screening as documented in Health Maintenance) for Arabella meets the Care Plan requirement. This Care Plan has been established and reviewed with the Patient.    Counseling Resources:  ATP IV Guidelines  Pooled Cohorts Equation Calculator  Breast Cancer Risk Calculator  Breast Cancer: Medication to Reduce Risk  FRAX Risk Assessment  ICSI Preventive Guidelines  Dietary Guidelines for Americans, 2010  USDA's MyPlate  ASA Prophylaxis  Lung CA Screening    THOMAS Butler [facility]    Identified Health Risks:  "

## 2021-09-26 ENCOUNTER — HEALTH MAINTENANCE LETTER (OUTPATIENT)
Age: 71
End: 2021-09-26

## 2021-09-29 DIAGNOSIS — R09.81 NASAL CONGESTION: Primary | ICD-10-CM

## 2021-09-30 RX ORDER — FLUTICASONE PROPIONATE 50 MCG
SPRAY, SUSPENSION (ML) NASAL
Qty: 48 ML | Refills: 1 | Status: SHIPPED | OUTPATIENT
Start: 2021-09-30 | End: 2022-10-13

## 2021-09-30 NOTE — TELEPHONE ENCOUNTER
Not a patient of Dr Johnson- PCP listed as Gloria Escobedo with [facility]. Forwarding to [facility].     Mariana Mendes RN BSN 9/30/2021 11:32 AM

## 2021-11-30 ENCOUNTER — OFFICE VISIT (OUTPATIENT)
Dept: DERMATOLOGY | Facility: CLINIC | Age: 71
End: 2021-11-30
Payer: COMMERCIAL

## 2021-11-30 VITALS — SYSTOLIC BLOOD PRESSURE: 187 MMHG | HEART RATE: 76 BPM | OXYGEN SATURATION: 98 % | DIASTOLIC BLOOD PRESSURE: 91 MMHG

## 2021-11-30 DIAGNOSIS — D23.9 DERMAL NEVUS: ICD-10-CM

## 2021-11-30 DIAGNOSIS — D18.01 ANGIOMA OF SKIN: ICD-10-CM

## 2021-11-30 DIAGNOSIS — L82.1 SEBORRHEIC KERATOSIS: ICD-10-CM

## 2021-11-30 DIAGNOSIS — L81.4 LENTIGO: Primary | ICD-10-CM

## 2021-11-30 PROCEDURE — 99213 OFFICE O/P EST LOW 20 MIN: CPT | Performed by: DERMATOLOGY

## 2021-11-30 NOTE — NURSING NOTE
Chief Complaint   Patient presents with     Skin Check       Vitals:    11/30/21 1110   BP: (!) 175/76   Pulse: 76   SpO2: 98%     Wt Readings from Last 1 Encounters:   08/27/21 55.6 kg (122 lb 8 oz)       Roxy Rosen LPN.................11/30/2021

## 2021-11-30 NOTE — PROGRESS NOTES
Arabella Rodríguez is an extremely pleasant 71 year old year old female patient here today for brown spot son skin.   .   Patient states this has been present for a while.  Patient reports the following symptoms:  none.  Patient reports the following previous treatments none.  These treatments did not work.  Patient reports the following modifying factors none.  Associated symptoms: none.  Patient has no other skin complaints today.  Remainder of the HPI, Meds, PMH, Allergies, FH, and SH was reviewed in chart.      Past Medical History:   Diagnosis Date     Hypertension      Osteopenia      Thyroid nodules [facility]     benign        Past Surgical History:   Procedure Laterality Date     ABDOMEN SURGERY  1986         BIOPSY  2014    Thyroid     C ANESTH, SECTION       COLONOSCOPY       COLONOSCOPY N/A 2018    Procedure: COLONOSCOPY;  colonoscopy;  Surgeon: Xu Feliciano MD;  Location: [facility]     EXCISE MASS WRIST Left 2016    Procedure: EXCISE MASS WRIST;  Surgeon: Germain Hull MD;  Location: [facility]     ORTHOPEDIC SURGERY  2016    I had a benign tumor removed from my wrist.     TONSILLECTOMY          Family History   Problem Relation Age of Onset     Lipids Mother      Arthritis Mother      Hypertension Mother      Hyperlipidemia Mother      Osteopenia Mother      Hypertension Father      Hyperlipidemia Father      Prostate Cancer Father      Hypertension Paternal Grandfather      Neurologic Disorder Brother         brain tumor     Other Cancer Brother          Brain Surgery/ Brain Surgery     Depression Sister         early      Depression Niece      Other Cancer Brother          Brain Surgery     Depression Niece              Depression Sister         early      Diabetes No family hx of      Thyroid Cancer No family hx of      Thyroid Disease No family hx of        Social History     Socioeconomic History     Marital status:       Spouse name: Not on file     Number of children: Not on file     Years of education: Not on file     Highest education level: Not on file   Occupational History     Employer: [employer]   Tobacco Use     Smoking status: Former Smoker     Packs/day: 0.50     Years: 9.00     Pack years: 4.50     Types: Cigarettes     Start date: 10/1/1972     Quit date: 1981     Years since quittin.6     Smokeless tobacco: Never Used     Tobacco comment: I quit in    Substance and Sexual Activity     Alcohol use: Yes     Comment: 1 o 2 then switch  to na     Drug use: No     Sexual activity: Not Currently     Partners: Male   Other Topics Concern     Parent/sibling w/ CABG, MI or angioplasty before 65F 55M? No      Service No     Blood Transfusions No     Caffeine Concern Yes     Comment: 3 cups cofffee a day     Occupational Exposure No     Hobby Hazards No     Sleep Concern No     Stress Concern No     Weight Concern No     Special Diet Yes     Comment: calcium with D one a day     Back Care No     Exercise Yes     Comment: couple times a wk     Bike Helmet No     Seat Belt Yes     Self-Exams No   Social History Narrative     Not on file     Social Determinants of Health     Financial Resource Strain: Not on file   Food Insecurity: Not on file   Transportation Needs: Not on file   Physical Activity: Not on file   Stress: Not on file   Social Connections: Not on file   Intimate Partner Violence: Not on file   Housing Stability: Not on file       Outpatient Encounter Medications as of 2021   Medication Sig Dispense Refill     CALCIUM + D OR 1 TABLET ORALLY DAILY       fluticasone (FLONASE) 50 MCG/ACT nasal spray INHALE 1-2 SPRAYS INTO EACH NOSTRIL ONCE DAILY 48 mL 1     hydrOXYzine (VISTARIL) 25 MG capsule TAKE 1 CAPSULE (25 MG) BY MOUTH NIGHTLY AS NEEDED FOR ANXIETY OR OTHER (SLEEP) 30 capsule 1     losartan-hydrochlorothiazide (HYZAAR) 100-25 MG tablet Take 1 tablet by mouth  daily 90 tablet 3     MULTI-VITAMIN OR 1 TABLET ORALLY DAILY       triamcinolone (KENALOG) 0.1 % external cream Apply topically 2 times daily (Patient not taking: Reported on 4/28/2021) 30 g 0     valACYclovir (VALTREX) 1000 mg tablet IF YOU HAVE A COLD SORE USE 2 TABLETS BY MOUTH TWO TIMES A DAY FOR 2 DAYS.  IF YOU HAVE THE RASH ON YOUR LEG TAKE 1/2 TABLET TWO TIMES A DAY FOR 3 DAYS (Patient not taking: Reported on 8/27/2021) 30 tablet 1     No facility-administered encounter medications on file as of 11/30/2021.             O:   NAD, WDWN, Alert & Oriented, Mood & Affect wnl, Vitals stable   Here today alone   BP (!) 175/76   Pulse 76   SpO2 98%    General appearance normal   Vitals stable   Alert, oriented and in no acute distress      Following lymph nodes palpated: Occipital, Cervical, Supraclavicular nolad      Stuck on papules and brown macules on trunk and ext   Red papules on trunk  Flesh colored papules on trunk     The remainder of the full exam was normal; the following areas were examined:  conjunctiva/lids, oral mucosa, neck, peripheral vascular system, abdomen, lymph nodes, digits/nails, eccrine and apocrine glands, scalp/hair, face, neck, chest, abdomen, buttocks, back, RUE, LUE, RLE, LLE       Eyes: Conjunctivae/lids:Normal     ENT: Lips, buccal mucosa, tongue: normal    MSK:Normal    Cardiovascular: peripheral edema none    Pulm: Breathing Normal    Lymph Nodes: No Head and Neck Lymphadenopathy     Neuro/Psych: Orientation:Alert and Orientedx3 ; Mood/Affect:normal       A/P:  1. Seborrheic keratosis, lentigo, angioma, dermal nevus  It was a pleasure speaking to Arabella Rodríguez today.  Previous clinic notes and pertinent laboratory tests were reviewed prior to Arabella Rodríguez's visit.  Nature and genetics of benign skin lesions dicussed with patient.  Signs and Symptoms of skin cancer discussed with patient.  Patient encouraged to perform monthly skin exams.  UV precautions reviewed with  patient.  Risks of non-melanoma skin cancer discussed with patient   Return to clinic 12 months

## 2021-11-30 NOTE — LETTER
2021         RE: Arabella Rodríguez  [address] [place]  [facility] [address]        Dear Colleague,    Thank you for referring your patient, Arabella Rodríguez, to the [facility]. Please see a copy of my visit note below.    Arabella Rodríguez is an extremely pleasant 71 year old year old female patient here today for brown spot son skin.   .   Patient states this has been present for a while.  Patient reports the following symptoms:  none.  Patient reports the following previous treatments none.  These treatments did not work.  Patient reports the following modifying factors none.  Associated symptoms: none.  Patient has no other skin complaints today.  Remainder of the HPI, Meds, PMH, Allergies, FH, and SH was reviewed in chart.      Past Medical History:   Diagnosis Date     Hypertension      Osteopenia      Thyroid nodules [facility]     benign        Past Surgical History:   Procedure Laterality Date     ABDOMEN SURGERY  1986         BIOPSY  2014    Thyroid     C ANESTH, SECTION       COLONOSCOPY       COLONOSCOPY N/A 2018    Procedure: COLONOSCOPY;  colonoscopy;  Surgeon: Xu Feliciano MD;  Location: [facility]     EXCISE MASS WRIST Left 2016    Procedure: EXCISE MASS WRIST;  Surgeon: Germain Hull MD;  Location: [facility]     ORTHOPEDIC SURGERY  2016    I had a benign tumor removed from my wrist.     TONSILLECTOMY          Family History   Problem Relation Age of Onset     Lipids Mother      Arthritis Mother      Hypertension Mother      Hyperlipidemia Mother      Osteopenia Mother      Hypertension Father      Hyperlipidemia Father      Prostate Cancer Father      Hypertension Paternal Grandfather      Neurologic Disorder Brother         brain tumor     Other Cancer Brother          Brain Surgery/ Brain Surgery     Depression Sister         early      Depression Niece      Other Cancer Brother           Brain Surgery     Depression Niece         2007     Depression Sister         early      Diabetes No family hx of      Thyroid Cancer No family hx of      Thyroid Disease No family hx of        Social History     Socioeconomic History     Marital status:      Spouse name: Not on file     Number of children: Not on file     Years of education: Not on file     Highest education level: Not on file   Occupational History     Employer: [employer]   Tobacco Use     Smoking status: Former Smoker     Packs/day: 0.50     Years: 9.00     Pack years: 4.50     Types: Cigarettes     Start date: 10/1/1972     Quit date: 1981     Years since quittin.6     Smokeless tobacco: Never Used     Tobacco comment: I quit in    Substance and Sexual Activity     Alcohol use: Yes     Comment: 1 o 2 then switch  to na     Drug use: No     Sexual activity: Not Currently     Partners: Male   Other Topics Concern     Parent/sibling w/ CABG, MI or angioplasty before 65F 55M? No      Service No     Blood Transfusions No     Caffeine Concern Yes     Comment: 3 cups cofffee a day     Occupational Exposure No     Hobby Hazards No     Sleep Concern No     Stress Concern No     Weight Concern No     Special Diet Yes     Comment: calcium with D one a day     Back Care No     Exercise Yes     Comment: couple times a wk     Bike Helmet No     Seat Belt Yes     Self-Exams No   Social History Narrative     Not on file     Social Determinants of Health     Financial Resource Strain: Not on file   Food Insecurity: Not on file   Transportation Needs: Not on file   Physical Activity: Not on file   Stress: Not on file   Social Connections: Not on file   Intimate Partner Violence: Not on file   Housing Stability: Not on file       Outpatient Encounter Medications as of 2021   Medication Sig Dispense Refill     CALCIUM + D OR 1 TABLET ORALLY DAILY       fluticasone (FLONASE) 50 MCG/ACT nasal spray INHALE  1-2 SPRAYS INTO EACH NOSTRIL ONCE DAILY 48 mL 1     hydrOXYzine (VISTARIL) 25 MG capsule TAKE 1 CAPSULE (25 MG) BY MOUTH NIGHTLY AS NEEDED FOR ANXIETY OR OTHER (SLEEP) 30 capsule 1     losartan-hydrochlorothiazide (HYZAAR) 100-25 MG tablet Take 1 tablet by mouth daily 90 tablet 3     MULTI-VITAMIN OR 1 TABLET ORALLY DAILY       triamcinolone (KENALOG) 0.1 % external cream Apply topically 2 times daily (Patient not taking: Reported on 4/28/2021) 30 g 0     valACYclovir (VALTREX) 1000 mg tablet IF YOU HAVE A COLD SORE USE 2 TABLETS BY MOUTH TWO TIMES A DAY FOR 2 DAYS.  IF YOU HAVE THE RASH ON YOUR LEG TAKE 1/2 TABLET TWO TIMES A DAY FOR 3 DAYS (Patient not taking: Reported on 8/27/2021) 30 tablet 1     No facility-administered encounter medications on file as of 11/30/2021.             O:   NAD, WDWN, Alert & Oriented, Mood & Affect wnl, Vitals stable   Here today alone   BP (!) 175/76   Pulse 76   SpO2 98%    General appearance normal   Vitals stable   Alert, oriented and in no acute distress      Following lymph nodes palpated: Occipital, Cervical, Supraclavicular nolad      Stuck on papules and brown macules on trunk and ext   Red papules on trunk  Flesh colored papules on trunk     The remainder of the full exam was normal; the following areas were examined:  conjunctiva/lids, oral mucosa, neck, peripheral vascular system, abdomen, lymph nodes, digits/nails, eccrine and apocrine glands, scalp/hair, face, neck, chest, abdomen, buttocks, back, RUE, LUE, RLE, LLE       Eyes: Conjunctivae/lids:Normal     ENT: Lips, buccal mucosa, tongue: normal    MSK:Normal    Cardiovascular: peripheral edema none    Pulm: Breathing Normal    Lymph Nodes: No Head and Neck Lymphadenopathy     Neuro/Psych: Orientation:Alert and Orientedx3 ; Mood/Affect:normal       A/P:  1. Seborrheic keratosis, lentigo, angioma, dermal nevus  It was a pleasure speaking to Arabella Rodríguez today.  Previous clinic notes and pertinent laboratory  tests were reviewed prior to Arabella Rodríguez's visit.  Nature and genetics of benign skin lesions dicussed with patient.  Signs and Symptoms of skin cancer discussed with patient.  Patient encouraged to perform monthly skin exams.  UV precautions reviewed with patient.  Risks of non-melanoma skin cancer discussed with patient   Return to clinic 12 months        Again, thank you for allowing me to participate in the care of your patient.        Sincerely,        Elkin Hooks MD

## 2021-12-01 ENCOUNTER — MYC MEDICAL ADVICE (OUTPATIENT)
Dept: FAMILY MEDICINE | Facility: CLINIC | Age: 71
End: 2021-12-01
Payer: COMMERCIAL

## 2021-12-02 NOTE — TELEPHONE ENCOUNTER
Spoke to the patient who reports she was at the dermatology office few days ago and the blood pressure was high. See my chart. Patient is asymptomatic today, she checked at home today and it is 146/70, pulse 76. Scheduled patient for an RN check tomorrow, told patient to check her home B/P twice today and call the clinic if high, otherwise bring readings to appointment tomorrow including home cuff, patient is agreeable to this.    STEFANIA Bedolla

## 2021-12-03 ENCOUNTER — TELEPHONE (OUTPATIENT)
Dept: FAMILY MEDICINE | Facility: CLINIC | Age: 71
End: 2021-12-03

## 2021-12-03 ENCOUNTER — ALLIED HEALTH/NURSE VISIT (OUTPATIENT)
Dept: FAMILY MEDICINE | Facility: CLINIC | Age: 71
End: 2021-12-03
Payer: COMMERCIAL

## 2021-12-03 VITALS — DIASTOLIC BLOOD PRESSURE: 72 MMHG | HEART RATE: 72 BPM | SYSTOLIC BLOOD PRESSURE: 150 MMHG

## 2021-12-03 DIAGNOSIS — I10 BENIGN ESSENTIAL HYPERTENSION: Primary | ICD-10-CM

## 2021-12-03 DIAGNOSIS — I10 PRIMARY HYPERTENSION: Primary | ICD-10-CM

## 2021-12-03 PROCEDURE — 99207 PR NO CHARGE NURSE ONLY: CPT

## 2021-12-03 NOTE — TELEPHONE ENCOUNTER
"Chief Complaint   Patient presents with     Consult     recurrent fissure of the foreskin       Blood pressure (!) 153/95, pulse 90, height 1.803 m (5' 11\"), weight 127.9 kg (282 lb). Body mass index is 39.33 kg/m .    Patient Active Problem List   Diagnosis     CARDIOVASCULAR SCREENING; LDL GOAL LESS THAN 160     Mild persistent asthma without complication     Obesity due to excess calories, unspecified obesity severity     Essential hypertension     Morbid obesity (H)       Allergies   Allergen Reactions     Cats      Itchy eyes, runny nose, wheezing     Dogs      Itchy eyes, runny nose, wheezing     Pollen Extract      Runny nose       Current Outpatient Medications   Medication Sig Dispense Refill     albuterol (VENTOLIN HFA) 108 (90 Base) MCG/ACT inhaler INHALE 2 PUFFS INTO THE LUNGS EVERY 6 HOURS AS NEEDED FOR SHORTNESS OF BREATH, DIFFICULTY BREATHING OR WHEEZING. 8 g 11     betamethasone dipropionate (DIPROSONE) 0.05 % external cream Apply topically daily as needed       fluticasone-salmeterol (ADVAIR DISKUS) 250-50 MCG/DOSE inhaler Inhale 1 puff into the lungs every 12 hours ### DO NOT FILL NOW.  Please update patient's profile to reflect additional refills.  #### 60 each 11     losartan-hydrochlorothiazide (HYZAAR) 100-12.5 MG tablet Take 1 tablet by mouth daily 90 tablet 3     losartan (COZAAR) 50 MG tablet Take 1 tablet (50 mg) by mouth daily (Patient not taking: Reported on 10/19/2021) 30 tablet 0       Social History     Tobacco Use     Smoking status: Never Smoker     Smokeless tobacco: Never Used   Substance Use Topics     Alcohol use: No     Alcohol/week: 0.0 standard drinks     Comment: quit 08/17     Drug use: No       Sugey Fowler CMA  10/27/2021  9:53 AM     " Arabella Rodríguez is a 71 year old year old patient who comes in today for a Blood Pressure check because of ongoing blood pressure monitoring.  Vital Signs as repeated by /78. p-72, 158/78, 166/78, p-72 See others in graphics.  Patient is taking medication as prescribed  Patient is tolerating medications well.  Patient is monitoring Blood Pressure at home.  Average readings if yes are 142/70  Current complaints: none  Disposition:  Pt says her BP goes up when in Drs office. She is exercising 3 days a week at the gym.

## 2021-12-03 NOTE — PROGRESS NOTES
Arabella Rodríguez is a 71 year old year old patient who comes in today for a Blood Pressure check because of ongoing blood pressure monitoring.  Vital Signs as repeated by /78. p-72, 158/78, 166/78, p-72 See others in graphics.  Patient is taking medication as prescribed  Patient is tolerating medications well.  Patient is monitoring Blood Pressure at home.  Average readings if yes are 142/70  Current complaints: none  Disposition:  Pt says her BP goes up when in Drs office. She is exercising 3 days a week at the gym.

## 2021-12-05 RX ORDER — AMLODIPINE BESYLATE 2.5 MG/1
2.5 TABLET ORAL DAILY
Qty: 30 TABLET | Refills: 1 | Status: SHIPPED | OUTPATIENT
Start: 2021-12-05 | End: 2022-01-25

## 2021-12-05 NOTE — TELEPHONE ENCOUNTER
Since BP still elevated and even home BP above normal range I recommend to add small dose of Norvasc 2.5 mg daily and recheck BP again in 2 weeks     THOMAS Butler CNP

## 2021-12-06 NOTE — TELEPHONE ENCOUNTER
Patient returns call and was given provider's message below with understanding voiced. RN assisted with scheduling RN BP check for 12/20/21 and advised calling in the meantime with any concerns.     Addendum:  Patient called back 5 minutes later and wanted appt switched to 12/21/21, so RN did this per request.     Shakira Orellana RN  [facility]

## 2021-12-21 ENCOUNTER — TELEPHONE (OUTPATIENT)
Dept: FAMILY MEDICINE | Facility: CLINIC | Age: 71
End: 2021-12-21

## 2021-12-21 ENCOUNTER — ALLIED HEALTH/NURSE VISIT (OUTPATIENT)
Dept: FAMILY MEDICINE | Facility: CLINIC | Age: 71
End: 2021-12-21
Payer: COMMERCIAL

## 2021-12-21 VITALS — DIASTOLIC BLOOD PRESSURE: 62 MMHG | SYSTOLIC BLOOD PRESSURE: 140 MMHG

## 2021-12-21 DIAGNOSIS — I10 BENIGN ESSENTIAL HYPERTENSION: Primary | ICD-10-CM

## 2021-12-21 PROCEDURE — 99207 PR NO CHARGE NURSE ONLY: CPT

## 2021-12-21 NOTE — PROGRESS NOTES
Arabella Rodríguez is a 71 year old year old patient who comes in today for a Blood Pressure check because of new medication.  Vital Signs as repeated by RN: 156/68, 140/62. Patient reports BP does tend to run elevated when being checked in clinic.  Patient is taking medication as prescribed  Patient is tolerating medications well.  Patient is monitoring Blood Pressure at home.  Average readings if yes are: see below.  Current complaints: occasional light-headedness and tired.  Disposition:  Routed to provider for review.    Home readings:    Digital:  12/7 140/76  12/8 147/76  12/12 113/64  Manual:  12/12 128/60  12/13 124/60  12/14 120/67  12/17 114/70  12/18 120/60  12/20 120/68    Patient reports she's taking the amlodipine in the morning, around 8:30/9 a.m. She says that around 11am, she feels tired and occasionally dizzy. Reports that symptoms began a few days after starting amlodipine. She reports that symptoms have improved somewhat, and denies any other new symptoms. Patient wondering if she should take the medication at night, so that she won't get tired during the day.    Nuzhat Abbasi RN  [facility]

## 2021-12-21 NOTE — TELEPHONE ENCOUNTER
Notes from RN BP check today as follows:    Arabella Rodríguez is a 71 year old year old patient who comes in today for a Blood Pressure check because of new medication (Norvasc).  Vital Signs as repeated by RN: 156/68, 140/62. Patient reports BP does tend to run elevated when being checked in clinic.  Patient is taking medication as prescribed  Patient is tolerating medications well.  Patient is monitoring Blood Pressure at home.  Average readings if yes are: see below.  Current complaints: occasional light-headedness and tired.  Disposition:  Routed to provider for review.     Home readings:     Digital:  12/7 140/76  12/8 147/76  12/12 113/64  Manual:  12/12 128/60  12/13 124/60  12/14 120/67  12/17 114/70  12/18 120/60  12/20 120/68    Patient reports she's taking the amlodipine in the morning, around 8:30/9 a.m. She says that around 11am, she feels tired and occasionally dizzy. Reports that symptoms began a few days after starting amlodipine. She reports that symptoms have improved somewhat, and denies any other new symptoms. Patient wondering if she should take the medication at night, so that she won't get tired during the day.     Nuzhat Abbasi RN  [facility]

## 2021-12-22 VITALS — SYSTOLIC BLOOD PRESSURE: 120 MMHG | DIASTOLIC BLOOD PRESSURE: 68 MMHG

## 2021-12-22 NOTE — TELEPHONE ENCOUNTER
BP well controlled now, recommend to take Amlodipine at bedtime to see if it will help with dizziness.    THOMAS Butler CNP

## 2022-01-25 DIAGNOSIS — I10 PRIMARY HYPERTENSION: ICD-10-CM

## 2022-01-25 RX ORDER — AMLODIPINE BESYLATE 2.5 MG/1
2.5 TABLET ORAL DAILY
Qty: 30 TABLET | Refills: 1 | Status: SHIPPED | OUTPATIENT
Start: 2022-01-25 | End: 2022-01-31

## 2022-01-27 ENCOUNTER — MYC MEDICAL ADVICE (OUTPATIENT)
Dept: FAMILY MEDICINE | Facility: CLINIC | Age: 72
End: 2022-01-27
Payer: COMMERCIAL

## 2022-01-27 DIAGNOSIS — I10 PRIMARY HYPERTENSION: ICD-10-CM

## 2022-01-31 RX ORDER — AMLODIPINE BESYLATE 2.5 MG/1
2.5 TABLET ORAL DAILY
Qty: 90 TABLET | Refills: 3 | Status: SHIPPED | OUTPATIENT
Start: 2022-01-31 | End: 2022-05-05

## 2022-01-31 NOTE — TELEPHONE ENCOUNTER
Please see [employer] message.    Last OV on 8/27/21.  BP Readings from Last 6 Encounters:   12/22/21 120/68   12/21/21 (!) 140/62   12/03/21 (!) 150/72   11/30/21 (!) 187/91   08/27/21 126/64   05/13/21 132/64       Thank you    Erika RAPP RN

## 2022-05-03 DIAGNOSIS — I10 PRIMARY HYPERTENSION: ICD-10-CM

## 2022-05-05 RX ORDER — AMLODIPINE BESYLATE 2.5 MG/1
2.5 TABLET ORAL DAILY
Qty: 90 TABLET | Refills: 1 | Status: SHIPPED | OUTPATIENT
Start: 2022-05-05 | End: 2022-08-31

## 2022-07-01 ENCOUNTER — HOSPITAL ENCOUNTER (EMERGENCY)
Facility: CLINIC | Age: 72
Discharge: HOME | End: 2022-07-01
Attending: PHYSICIAN ASSISTANT | Admitting: PHYSICIAN ASSISTANT
Payer: COMMERCIAL

## 2022-07-01 VITALS
SYSTOLIC BLOOD PRESSURE: 160 MMHG | DIASTOLIC BLOOD PRESSURE: 78 MMHG | HEIGHT: 62 IN | OXYGEN SATURATION: 98 % | HEART RATE: 87 BPM | TEMPERATURE: 97.8 F | BODY MASS INDEX: 22.08 KG/M2 | WEIGHT: 120 LBS | RESPIRATION RATE: 14 BRPM

## 2022-07-01 DIAGNOSIS — M25.551 PAIN IN JOINT INVOLVING PELVIC REGION AND THIGH, RIGHT: ICD-10-CM

## 2022-07-01 PROCEDURE — 99213 OFFICE O/P EST LOW 20 MIN: CPT | Performed by: PHYSICIAN ASSISTANT

## 2022-07-01 PROCEDURE — G0463 HOSPITAL OUTPT CLINIC VISIT: HCPCS | Performed by: PHYSICIAN ASSISTANT

## 2022-07-01 RX ORDER — PREDNISONE 20 MG/1
TABLET ORAL
Qty: 10 TABLET | Refills: 0 | Status: SHIPPED | OUTPATIENT
Start: 2022-07-01 | End: 2022-08-31

## 2022-07-01 RX ORDER — HYDROCODONE BITARTRATE AND ACETAMINOPHEN 5; 325 MG/1; MG/1
1 TABLET ORAL EVERY 6 HOURS PRN
Qty: 10 TABLET | Refills: 0 | Status: SHIPPED | OUTPATIENT
Start: 2022-07-01 | End: 2022-08-31

## 2022-07-01 ASSESSMENT — ENCOUNTER SYMPTOMS
ABDOMINAL PAIN: 0
CONSTITUTIONAL NEGATIVE: 1
NUMBNESS: 0
PSYCHIATRIC NEGATIVE: 1
FEVER: 0
DIFFICULTY URINATING: 0
SHORTNESS OF BREATH: 0
RESPIRATORY NEGATIVE: 1
NECK STIFFNESS: 0
CARDIOVASCULAR NEGATIVE: 1
WEAKNESS: 0
EYE REDNESS: 0
HEADACHES: 0
COLOR CHANGE: 0
CONFUSION: 0
NEUROLOGICAL NEGATIVE: 1
ARTHRALGIAS: 0
GASTROINTESTINAL NEGATIVE: 1

## 2022-07-01 NOTE — DISCHARGE INSTRUCTIONS
Heat and ice    Use medications as directed.  Prednisone can cause jitteriness, increased appetite, difficult time sleeping.  Norco can cause sedation.  Do not drive or operate machinery within 8 hours of taking this medication.  There is a small amount of Tylenol in this make sure you are adding an Ensure daily amount.    Tylenol and ibuprofen over-the-counter as needed for pain.  Tylenol you can take every 4 hours I do not exceed the daily recommended amount in a 24-hour period ibuprofen you can take every 6 hours as needed with no more than 600 mg up to 3 times daily in a 24-hour.  You can alternate these medications every 3 hours keeping in mind that there is some Tylenol in the Norco and not to exceed the recommended daily amount of Tylenol and ibuprofen in a 24-hour period    Follow-up with your primary care doctor for recheck in 1 week if no improvement of symptoms.  Return sooner if symptoms worsen or change or fail to improve.

## 2022-07-01 NOTE — ED TRIAGE NOTES
"Lifted something on Sunday, now with buttocks pain that radiates down right leg intermittently. Sees ortho for arthritis, and believes this is a \" trapped nerve\". Taking advil without relief.      Triage Assessment     Row Name 07/01/22 1220       Triage Assessment (Adult)    Airway WDL WDL       Respiratory WDL    Respiratory WDL WDL       Skin Circulation/Temperature WDL    Skin Circulation/Temperature WDL WDL       Cardiac WDL    Cardiac WDL WDL       Peripheral/Neurovascular WDL    Peripheral Neurovascular WDL WDL       Cognitive/Neuro/Behavioral WDL    Cognitive/Neuro/Behavioral WDL WDL              "

## 2022-07-01 NOTE — ED PROVIDER NOTES
"  History     Chief Complaint   Patient presents with     Leg Pain     Lifted something on Sunday, now with buttocks pain that radiates down right leg intermittently. Sees ortho for arthritis, and believes this is a \" trapped nerve\". Taking advil without relief.      HPI  Arabella Rodríguez is a 72 year old female who presents today with right buttocks and leg pain that started a few days ago. Patient states she was doing some heavy lifting the day prior to symptoms starting. She denies any known injury.  She states pain starts in the buttocks and radiates down the back of her right leg on and off.  She has had similar symptoms in the past and was seen for this with physical therapy and by orthopedic specialist.  She stopped doing her exercises and is wondering if that is what may be causing the symptoms to return.  She has been taking Advil without relief of symptoms.  She denies any loss of bowel or bladder function, saddle anesthesia, rash, abdominal pain, back pain, nausea or vomiting, numbness or tingling in the lower extremity or weakness in the lower extremity.    Allergies:  Allergies   Allergen Reactions     Formaldehyde Hives     Pravachol [Pravastatin] Muscle Pain (Myalgia)       Problem List:    Patient Active Problem List    Diagnosis Date Noted     Skin lesion 03/31/2015     Priority: Medium     CARDIOVASCULAR SCREENING; LDL GOAL LESS THAN 130 03/31/2015     Priority: Medium     Multiple thyroid nodules 08/24/2012     Priority: Medium     Hypertension 06/25/2012     Priority: Medium     Lisinopril caused cough -switched to losartan       Hyperlipidemia LDL goal <130 06/25/2012     Priority: Medium     Low bone density 02/22/2011     Priority: Medium     [facility] dexa Dual energy x-ray absorptiometry was performed on this 63.0 year old White Female, who reports - a history of: postmenopausal status, reformed tobacco habit, thyroid nodules, family history of osteoporosis or fractures - the following " current treatments: Calcium, Vitamin D, lipid lowering agents _________________________________________________________________________ Technical quality: Satisfactory. _________________________________________________________________________ Densitometry results: Comparison: 2013, 2010 Region Date BMD T - score Z - score BMD change from baseline BMD % change from baseline L1-L4 2013 1.149 g/cm  -0.3 1.4 -0.011 g/cm  -0.9% L1-L4 2010 1.160 g/cm  -0.2 1.3 baseline baseline Neck Left 2013 0.914 g/cm  -0.9 0.7 Neck Left 2010 0.967 g/cm  -0.5 0.9 Total Left 2013 0.959 g/cm  -0.4 0.9 0.006 g/cm  0.6% Total Left 2010 0.953 g/cm  -0.4 0.7 baseline baseline Neck Right 2013 0.853 g/cm  -1.3 0.2 Neck Right 2010 0.876 g/cm  -1.2 0.2 Total Right 2013 0.886 g/cm  -1.0 0.3 -0.003 g/cm  -0.3% Total Right 2010 0.889 g/cm  -0.9 0.2 baseline baseline _________________________________________________________________________ FORMATTED RESULTS WITH TABLES ARE LOCATED IN [facility] UNDER CHART REVIEW < ENCOUNTERS < PROVIDER <  2013 . The following are the conclusions and suggestions: Conclusions: Based on the most negative and valid T-score of -1.3 at the level of the right femoral neck, this patient has low bone density. The risk of osteoporotic fracture increases approximately 2-fold for each 1.0 SD decrease in T-score.        Thyroid cyst 2011     Priority: Medium     Xeroderma 2010     Priority: Medium     Herpes simplex type 1 infection 2010     Priority: Medium        Past Medical History:    Past Medical History:   Diagnosis Date     Hypertension      Osteopenia      Thyroid nodules [facility]        Past Surgical History:    Past Surgical History:   Procedure Laterality Date     ABDOMEN SURGERY  1986         BIOPSY  2014    Thyroid     C ANESTH, SECTION       COLONOSCOPY       COLONOSCOPY N/A 2018     Procedure: COLONOSCOPY;  colonoscopy;  Surgeon: Xu Feliciano MD;  Location: [facility]     EXCISE MASS WRIST Left 2016    Procedure: EXCISE MASS WRIST;  Surgeon: Germain Hull MD;  Location: [facility]     ORTHOPEDIC SURGERY  2016    I had a benign tumor removed from my wrist.     TONSILLECTOMY         Family History:    Family History   Problem Relation Age of Onset     Lipids Mother      Arthritis Mother      Hypertension Mother      Hyperlipidemia Mother      Osteopenia Mother      Hypertension Father      Hyperlipidemia Father      Prostate Cancer Father      Hypertension Paternal Grandfather      Neurologic Disorder Brother         brain tumor     Other Cancer Brother          Brain Surgery/ Brain Surgery     Depression Sister         early      Depression Niece      Other Cancer Brother          Brain Surgery     Depression Niece         2007     Depression Sister         early      Diabetes No family hx of      Thyroid Cancer No family hx of      Thyroid Disease No family hx of        Social History:  Marital Status:   [2]  Social History     Tobacco Use     Smoking status: Former Smoker     Packs/day: 0.50     Years: 9.00     Pack years: 4.50     Types: Cigarettes     Start date: 10/1/1972     Quit date: 1981     Years since quittin.1     Smokeless tobacco: Never Used     Tobacco comment: I quit in    Substance Use Topics     Alcohol use: Yes     Comment: 1 o 2 then switch  to na     Drug use: No        Medications:    HYDROcodone-acetaminophen (NORCO) 5-325 MG tablet  predniSONE (DELTASONE) 20 MG tablet  amLODIPine (NORVASC) 2.5 MG tablet  CALCIUM + D OR  fluticasone (FLONASE) 50 MCG/ACT nasal spray  hydrOXYzine (VISTARIL) 25 MG capsule  losartan-hydrochlorothiazide (HYZAAR) 100-25 MG tablet  MULTI-VITAMIN OR  triamcinolone (KENALOG) 0.1 % external cream  valACYclovir (VALTREX) 1000 mg tablet          Review of Systems   Constitutional: Negative.   "Negative for fever.   HENT: Negative for congestion.    Eyes: Negative for redness.   Respiratory: Negative.  Negative for shortness of breath.    Cardiovascular: Negative.  Negative for chest pain.   Gastrointestinal: Negative.  Negative for abdominal pain.   Genitourinary: Negative.  Negative for difficulty urinating.   Musculoskeletal: Negative for arthralgias and neck stiffness.        Right buttock pain with radiation down leg.    Skin: Negative.  Negative for color change and rash.   Neurological: Negative.  Negative for weakness, numbness and headaches.   Psychiatric/Behavioral: Negative.  Negative for confusion.   All other systems reviewed and are negative.      Physical Exam   BP: (!) 160/78  Pulse: 87  Temp: 97.8  F (36.6  C)  Resp: 14  Height: 157.5 cm (5' 2\")  Weight: 54.4 kg (120 lb)  SpO2: 98 %      Physical Exam  Vitals and nursing note reviewed.   Constitutional:       General: She is not in acute distress.     Appearance: Normal appearance. She is normal weight. She is not ill-appearing or toxic-appearing.   HENT:      Head: Normocephalic and atraumatic.   Eyes:      General: No scleral icterus.     Extraocular Movements: Extraocular movements intact.      Conjunctiva/sclera: Conjunctivae normal.      Pupils: Pupils are equal, round, and reactive to light.   Cardiovascular:      Rate and Rhythm: Normal rate and regular rhythm.      Pulses: Normal pulses.      Heart sounds: Normal heart sounds.   Pulmonary:      Effort: Pulmonary effort is normal.      Breath sounds: Normal breath sounds.   Musculoskeletal:         General: No swelling, tenderness, deformity or signs of injury. Normal range of motion.      Cervical back: Normal, normal range of motion and neck supple.      Thoracic back: Normal.      Lumbar back: Normal. No swelling, tenderness or bony tenderness. Normal range of motion. Negative right straight leg raise test and negative left straight leg raise test.        Back:       Right lower " leg: No edema.      Left lower leg: No edema.   Skin:     General: Skin is warm.      Capillary Refill: Capillary refill takes less than 2 seconds.      Findings: No bruising, erythema or rash.   Neurological:      General: No focal deficit present.      Mental Status: She is alert and oriented to person, place, and time.      GCS: GCS eye subscore is 4. GCS verbal subscore is 5. GCS motor subscore is 6.      Cranial Nerves: Cranial nerves 2-12 are intact.      Sensory: Sensation is intact. No sensory deficit.      Motor: Motor function is intact. No weakness.      Coordination: Coordination normal.      Gait: Gait is intact. Gait normal.      Deep Tendon Reflexes: Reflexes normal.      Reflex Scores:       Patellar reflexes are 2+ on the right side and 2+ on the left side.       Achilles reflexes are 2+ on the right side and 2+ on the left side.     Comments: Also strength 5 out of 5 to bilateral lower extremities.  Patient neurovascular intact to bilateral lower extremities.  Negative straight leg raise and no pain with dorsiflexion of the great toes to bilateral lower extremities.  Patient able to heel and toe walk and squat.  She states some pain with internal rotation of the hip in the buttocks, but otherwise has full range of motion at hip, knee, and back.  She also states some pain in the right gluteus with twisting in the back.  No rash,  bruising or erythema noted.   Psychiatric:         Mood and Affect: Mood normal.         Behavior: Behavior normal.         Thought Content: Thought content normal.         Judgment: Judgment normal.         ED Course                 Procedures             Critical Care time:  none               No results found for this or any previous visit (from the past 24 hour(s)).    Medications - No data to display    Assessments & Plan (with Medical Decision Making)     I have reviewed the nursing notes.    I have reviewed the findings, diagnosis, plan and need for follow up with  the patient.    Arabella Rodríguez is a 72 year old female who presents today with right buttocks and leg pain that started a few days ago. Patient states she was doing some heavy lifting the day prior to symptoms starting. She denies any known injury.  She states pain starts in the buttocks and radiates down the back of her right leg on and off.  She has had similar symptoms in the past and was seen for this with physical therapy and by orthopedic specialist.  She stopped doing her exercises and is wondering if that is what may be causing the symptoms to return.  She has been taking Advil without relief of symptoms.  She denies any loss of bowel or bladder function, saddle anesthesia, rash, abdominal pain, back pain, nausea or vomiting, numbness or tingling in the lower extremity or weakness in the lower extremity.    See exam findings above.  Vitals within normal limits other than slightly elevated blood pressure.  Patient is alert and active and in no acute distress.  No concerning red flags findings on exam.  Muscle strength 5 out of 5 bilaterally throughout with normal reflexes and patient being neurovascularly intact.  Positive tenderness to palpation to the right gluteus jose and pain can be reproduced with twisting and movement of the hip.  Discussed obtaining x-rays of lumbar spine and hip/pelvis region, but patient declined after shared decision making since she has no known injuries.  Recommend follow-up with primary care doctor or possible orthopedic specialist for further evaluation and management if symptoms persist.  Patient states she is can start up her physical therapy exercise again and may need to follow-up with physical therapy that does not improve.  I suspect that this is more of a nerve flareup at this time due to radiation of pain down the leg probably from overuse.  Patient sent home with prescription prednisone to use as directed.  She also asked for something to help her sleep at night  since it seems to wake her up at night so Norco was given.  Side effects and risks discussed with both these medications and given on discharge paperwork.  Patient can continue Tylenol and ibuprofen over-the-counter and close follow-up if symptoms persist or fail to improve.  Patient agreed with plan and discharged in stable condition.    Discharge Medication List as of 7/1/2022 12:49 PM      START taking these medications    Details   HYDROcodone-acetaminophen (NORCO) 5-325 MG tablet Take 1 tablet by mouth every 6 hours as needed for severe pain, Disp-10 tablet, R-0, E-Prescribe      predniSONE (DELTASONE) 20 MG tablet Take two tablets (= 40mg) each day for 5 (five) days, Disp-10 tablet, R-0, E-Prescribe             Final diagnoses:   Pain in joint involving pelvic region and thigh, right - right buttocks with radiation into leg       7/1/2022   [facility] EMERGENCY DEPT     Soha Dominguez PA-C  07/01/22 2034

## 2022-07-15 ENCOUNTER — MYC MEDICAL ADVICE (OUTPATIENT)
Dept: FAMILY MEDICINE | Facility: CLINIC | Age: 72
End: 2022-07-15

## 2022-07-15 DIAGNOSIS — E04.2 MULTIPLE THYROID NODULES: ICD-10-CM

## 2022-07-15 DIAGNOSIS — E78.5 HYPERLIPIDEMIA LDL GOAL <130: Primary | ICD-10-CM

## 2022-07-15 DIAGNOSIS — I10 PRIMARY HYPERTENSION: ICD-10-CM

## 2022-07-15 NOTE — TELEPHONE ENCOUNTER
Gloria,    Please see pt's [employer] message.   Has appt with you 8/31. Wants all labs ordered. There is a lipid ordered from 8/27/21. Will need new order.  I have pended labs for your consideration.    Tanya Brasher RN

## 2022-07-26 ENCOUNTER — HOSPITAL ENCOUNTER (OUTPATIENT)
Dept: ULTRASOUND IMAGING | Facility: CLINIC | Age: 72
Discharge: HOME | End: 2022-07-26
Attending: NURSE PRACTITIONER | Admitting: NURSE PRACTITIONER
Payer: COMMERCIAL

## 2022-07-26 DIAGNOSIS — E04.1 THYROID NODULE: ICD-10-CM

## 2022-07-26 PROCEDURE — 76536 US EXAM OF HEAD AND NECK: CPT

## 2022-07-27 ENCOUNTER — MYC MEDICAL ADVICE (OUTPATIENT)
Dept: FAMILY MEDICINE | Facility: CLINIC | Age: 72
End: 2022-07-27

## 2022-07-28 NOTE — PROGRESS NOTES
[facility]    Outpatient Physical Therapy Discharge Note  Patient: Arabella Rodríguez  : 1950    Beginning/End Dates of Reporting Period:  21 to 22    Referring Provider: Dr. Olsen    Therapy Diagnosis: right hip pain     Client Self Report: Pt reports: she attended PT in 2019 to address her right hip pain.  Symptoms really improved, looking to review these exercises as some of the pain and weakness has returned.  Pt feels the pain only at night along her posterolateral hip.  It can travel down her leg (mid thigh) which is different. Pt is a side sleeper.    Objective Measurements:  Objective Measure: pain at night  Details: 7/10  Objective Measure: glute med right  Details:                    Goals:  Goal Identifier     Goal Description Patient will no longer wake at night due to right hip pain.   Target Date 21   Date Met      Progress (detail required for progress note):       Goal Identifier     Goal Description Patient will have 5/5 right hip ABD strength to stabilize hip and pelvis with workout routine.   Target Date 21   Date Met      Progress (detail required for progress note):       Goal Identifier     Goal Description Patient will be independent with her HEP to reduce future occurrence of pain and disability.   Target Date 21   Date Met  21   Progress (detail required for progress note):       Goal Identifier               Plan:  Discharge from therapy.    Discharge:    Reason for Discharge: working independently towards final goal    Equipment Issued: theraband    Discharge Plan: Patient to continue home program.    María Vargas, PT, DTP, SCS  Doctor of Physical Therapy #[id number]  [facility]  [phone]  [email or web address]

## 2022-08-16 ENCOUNTER — LAB (OUTPATIENT)
Dept: LAB | Facility: CLINIC | Age: 72
End: 2022-08-16
Payer: COMMERCIAL

## 2022-08-16 DIAGNOSIS — E04.2 MULTIPLE THYROID NODULES: ICD-10-CM

## 2022-08-16 DIAGNOSIS — E78.5 HYPERLIPIDEMIA LDL GOAL <130: ICD-10-CM

## 2022-08-16 DIAGNOSIS — I10 PRIMARY HYPERTENSION: ICD-10-CM

## 2022-08-16 LAB
ANION GAP SERPL CALCULATED.3IONS-SCNC: 6 MMOL/L (ref 3–14)
BASOPHILS # BLD AUTO: 0 10E3/UL (ref 0–0.2)
BASOPHILS NFR BLD AUTO: 0 %
BUN SERPL-MCNC: 12 MG/DL (ref 7–30)
CALCIUM SERPL-MCNC: 9.6 MG/DL (ref 8.5–10.1)
CHLORIDE BLD-SCNC: 101 MMOL/L (ref 94–109)
CHOLEST SERPL-MCNC: 277 MG/DL
CO2 SERPL-SCNC: 27 MMOL/L (ref 20–32)
CREAT SERPL-MCNC: 0.73 MG/DL (ref 0.52–1.04)
EOSINOPHIL # BLD AUTO: 0.1 10E3/UL (ref 0–0.7)
EOSINOPHIL NFR BLD AUTO: 2 %
ERYTHROCYTE [DISTWIDTH] IN BLOOD BY AUTOMATED COUNT: 12.1 % (ref 10–15)
FASTING STATUS PATIENT QL REPORTED: YES
GFR SERPL CREATININE-BSD FRML MDRD: 87 ML/MIN/1.73M2
GLUCOSE BLD-MCNC: 97 MG/DL (ref 70–99)
HCT VFR BLD AUTO: 37.3 % (ref 35–47)
HDLC SERPL-MCNC: 59 MG/DL
HGB BLD-MCNC: 12.5 G/DL (ref 11.7–15.7)
LDLC SERPL CALC-MCNC: 188 MG/DL
LYMPHOCYTES # BLD AUTO: 2.4 10E3/UL (ref 0.8–5.3)
LYMPHOCYTES NFR BLD AUTO: 37 %
MCH RBC QN AUTO: 30.3 PG (ref 26.5–33)
MCHC RBC AUTO-ENTMCNC: 33.5 G/DL (ref 31.5–36.5)
MCV RBC AUTO: 91 FL (ref 78–100)
MONOCYTES # BLD AUTO: 0.7 10E3/UL (ref 0–1.3)
MONOCYTES NFR BLD AUTO: 10 %
NEUTROPHILS # BLD AUTO: 3.3 10E3/UL (ref 1.6–8.3)
NEUTROPHILS NFR BLD AUTO: 51 %
NONHDLC SERPL-MCNC: 218 MG/DL
PLATELET # BLD AUTO: 328 10E3/UL (ref 150–450)
POTASSIUM BLD-SCNC: 4 MMOL/L (ref 3.4–5.3)
RBC # BLD AUTO: 4.12 10E6/UL (ref 3.8–5.2)
SODIUM SERPL-SCNC: 134 MMOL/L (ref 133–144)
TRIGL SERPL-MCNC: 150 MG/DL
TSH SERPL DL<=0.005 MIU/L-ACNC: 3.05 MU/L (ref 0.4–4)
WBC # BLD AUTO: 6.6 10E3/UL (ref 4–11)

## 2022-08-16 PROCEDURE — 85025 COMPLETE CBC W/AUTO DIFF WBC: CPT

## 2022-08-16 PROCEDURE — 80048 BASIC METABOLIC PNL TOTAL CA: CPT

## 2022-08-16 PROCEDURE — 80061 LIPID PANEL: CPT

## 2022-08-16 PROCEDURE — 36415 COLL VENOUS BLD VENIPUNCTURE: CPT

## 2022-08-16 PROCEDURE — 84443 ASSAY THYROID STIM HORMONE: CPT

## 2022-08-30 ASSESSMENT — ENCOUNTER SYMPTOMS
JOINT SWELLING: 0
NAUSEA: 0
WEAKNESS: 0
HEMATURIA: 0
MYALGIAS: 1
FREQUENCY: 0
DIZZINESS: 0
CHILLS: 0
ARTHRALGIAS: 1
SORE THROAT: 0
PARESTHESIAS: 0
HEARTBURN: 0
SHORTNESS OF BREATH: 0
NERVOUS/ANXIOUS: 0
DIARRHEA: 0
DYSURIA: 0
BREAST MASS: 0
EYE PAIN: 0
CONSTIPATION: 0
COUGH: 0
HEMATOCHEZIA: 0
ABDOMINAL PAIN: 0
FEVER: 0
PALPITATIONS: 0
HEADACHES: 0

## 2022-08-30 ASSESSMENT — ACTIVITIES OF DAILY LIVING (ADL): CURRENT_FUNCTION: NO ASSISTANCE NEEDED

## 2022-08-31 ENCOUNTER — OFFICE VISIT (OUTPATIENT)
Dept: FAMILY MEDICINE | Facility: CLINIC | Age: 72
End: 2022-08-31
Payer: COMMERCIAL

## 2022-08-31 ENCOUNTER — HOSPITAL ENCOUNTER (OUTPATIENT)
Dept: MAMMOGRAPHY | Facility: CLINIC | Age: 72
Discharge: HOME | End: 2022-08-31
Attending: NURSE PRACTITIONER | Admitting: NURSE PRACTITIONER
Payer: COMMERCIAL

## 2022-08-31 VITALS
HEART RATE: 77 BPM | TEMPERATURE: 97.2 F | RESPIRATION RATE: 16 BRPM | WEIGHT: 123.7 LBS | BODY MASS INDEX: 22.76 KG/M2 | OXYGEN SATURATION: 98 % | HEIGHT: 62 IN | DIASTOLIC BLOOD PRESSURE: 70 MMHG | SYSTOLIC BLOOD PRESSURE: 142 MMHG

## 2022-08-31 DIAGNOSIS — E78.5 HYPERLIPIDEMIA LDL GOAL <100: ICD-10-CM

## 2022-08-31 DIAGNOSIS — Z12.31 VISIT FOR SCREENING MAMMOGRAM: ICD-10-CM

## 2022-08-31 DIAGNOSIS — Z00.00 ANNUAL PHYSICAL EXAM: Primary | ICD-10-CM

## 2022-08-31 DIAGNOSIS — I10 PRIMARY HYPERTENSION: ICD-10-CM

## 2022-08-31 DIAGNOSIS — I10 BENIGN ESSENTIAL HYPERTENSION: ICD-10-CM

## 2022-08-31 PROCEDURE — G0438 PPPS, INITIAL VISIT: HCPCS | Performed by: NURSE PRACTITIONER

## 2022-08-31 PROCEDURE — 99214 OFFICE O/P EST MOD 30 MIN: CPT | Mod: 25 | Performed by: NURSE PRACTITIONER

## 2022-08-31 PROCEDURE — 77067 SCR MAMMO BI INCL CAD: CPT

## 2022-08-31 RX ORDER — LOSARTAN POTASSIUM AND HYDROCHLOROTHIAZIDE 25; 100 MG/1; MG/1
1 TABLET ORAL DAILY
Qty: 90 TABLET | Refills: 3 | Status: SHIPPED | OUTPATIENT
Start: 2022-08-31 | End: 2023-08-15

## 2022-08-31 RX ORDER — EZETIMIBE 10 MG/1
10 TABLET ORAL DAILY
Qty: 90 TABLET | Refills: 3 | Status: SHIPPED | OUTPATIENT
Start: 2022-08-31 | End: 2023-05-18

## 2022-08-31 RX ORDER — AMLODIPINE BESYLATE 2.5 MG/1
2.5 TABLET ORAL DAILY
Qty: 90 TABLET | Refills: 3 | Status: SHIPPED | OUTPATIENT
Start: 2022-08-31 | End: 2023-09-06

## 2022-08-31 ASSESSMENT — ENCOUNTER SYMPTOMS
PALPITATIONS: 0
JOINT SWELLING: 0
ABDOMINAL PAIN: 0
MYALGIAS: 1
EYE PAIN: 0
CHILLS: 0
DYSURIA: 0
HEMATOCHEZIA: 0
FEVER: 0
BREAST MASS: 0
NERVOUS/ANXIOUS: 0
DIZZINESS: 0
ARTHRALGIAS: 1
WEAKNESS: 0
FREQUENCY: 0
SORE THROAT: 0
HEADACHES: 0
CONSTIPATION: 0
PARESTHESIAS: 0
HEMATURIA: 0
SHORTNESS OF BREATH: 0
NAUSEA: 0
HEARTBURN: 0
DIARRHEA: 0
COUGH: 0

## 2022-08-31 ASSESSMENT — ACTIVITIES OF DAILY LIVING (ADL): CURRENT_FUNCTION: NO ASSISTANCE NEEDED

## 2022-08-31 ASSESSMENT — PAIN SCALES - GENERAL: PAINLEVEL: NO PAIN (0)

## 2022-08-31 NOTE — PROGRESS NOTES
"SUBJECTIVE:   Arabella Rodríguez is a 72 year old female who presents for Preventive Visit.    Patient has been advised of split billing requirements and indicates understanding: Yes  Are you in the first 12 months of your Medicare coverage?  No    Healthy Habits:     In general, how would you rate your overall health?  Good    Frequency of exercise:  2-3 days/week    Duration of exercise:  15-30 minutes    Do you usually eat at least 4 servings of fruit and vegetables a day, include whole grains    & fiber and avoid regularly eating high fat or \"junk\" foods?  No    Taking medications regularly:  Yes    Medication side effects:  None    Ability to successfully perform activities of daily living:  No assistance needed    Home Safety:  No safety concerns identified    Hearing Impairment:  Need to ask people to speak up or repeat themselves    In the past 6 months, have you been bothered by leaking of urine?  No    In general, how would you rate your overall mental or emotional health?  Good      PHQ-2 Total Score: 0    Additional concerns today:  No    Do you feel safe in your environment? Yes    Have you ever done Advance Care Planning? (For example, a Health Directive, POLST, or a discussion with a medical provider or your loved ones about your wishes): Yes, advance care planning is on file.    Fall risk  Fallen 2 or more times in the past year?: No  Any fall with injury in the past year?: No  click delete button to remove this line now  Cognitive Screening   1) Repeat 3 items (Leader, Season, Table)    2) Clock draw: NORMAL  3) 3 item recall: Recalls 3 objects  Results: 3 items recalled: COGNITIVE IMPAIRMENT LESS LIKELY    Mini-CogTM Copyright ELSI Blake. Licensed by the author for use in [facility]; reprinted with permission (patrick@.[facility]). All rights reserved.      Do you have sleep apnea, excessive snoring or daytime drowsiness?: no    Reviewed and updated as needed this visit by clinical staff   " Tobacco  Allergies  Meds  Problems  Med Hx  Surg Hx  Fam Hx  Soc   Hx          Reviewed and updated as needed this visit by Provider    Allergies  Meds  Problems              Social History     Tobacco Use     Smoking status: Former Smoker     Packs/day: 0.50     Years: 9.00     Pack years: 4.50     Types: Cigarettes     Start date: 10/1/1972     Quit date: 1981     Years since quittin.3     Smokeless tobacco: Never Used     Tobacco comment: I quit in    Substance Use Topics     Alcohol use: Yes     Comment: 1 o 2 then switch  to na     If you drink alcohol do you typically have >3 drinks per day or >7 drinks per week? No    Alcohol Use 2022   Prescreen: >3 drinks/day or >7 drinks/week? -   Prescreen: >3 drinks/day or >7 drinks/week? No     Hypertension Follow-up      Do you check your blood pressure regularly outside of the clinic? Yes     Are you following a low salt diet? No    Are your blood pressures ever more than 140 on the top number (systolic) OR more   than 90 on the bottom number (diastolic), for example 140/90? Yes      Current providers sharing in care for this patient include:   Patient Care Team:  Gloria Escobedo APRN CNP as PCP - General (Nurse Practitioner - Gerontology)  Sherice Estrada MD as MD (INTERNAL MEDICINE - ENDOCRINOLOGY, DIABETES & METABOLISM)  Gloria Escobedo APRN CNP as Assigned PCP  Elkin Hooks MD as Assigned Surgical Provider    The following health maintenance items are reviewed in Epic and correct as of today:  Health Maintenance Due   Topic Date Due     INFLUENZA VACCINE (1) 2022     Review of Systems   Constitutional: Negative for chills and fever.   HENT: Negative for congestion, ear pain, hearing loss and sore throat.    Eyes: Negative for pain and visual disturbance.   Respiratory: Negative for cough and shortness of breath.    Cardiovascular: Negative for chest pain, palpitations and peripheral edema.  "  Gastrointestinal: Negative for abdominal pain, constipation, diarrhea, heartburn, hematochezia and nausea.   Breasts:  Negative for tenderness, breast mass and discharge.   Genitourinary: Negative for dysuria, frequency, genital sores, hematuria, pelvic pain, urgency, vaginal bleeding and vaginal discharge.   Musculoskeletal: Positive for arthralgias and myalgias. Negative for joint swelling.   Skin: Negative for rash.   Neurological: Negative for dizziness, weakness, headaches and paresthesias.   Psychiatric/Behavioral: Negative for mood changes. The patient is not nervous/anxious.        OBJECTIVE:   BP (!) 142/70 (BP Location: Left arm, Patient Position: Sitting, Cuff Size: Adult Regular)   Pulse 77   Temp 97.2  F (36.2  C) (Tympanic)   Resp 16   Ht 1.575 m (5' 2\")   Wt 56.1 kg (123 lb 11.2 oz)   SpO2 98%   BMI 22.63 kg/m   Estimated body mass index is 22.63 kg/m  as calculated from the following:    Height as of this encounter: 1.575 m (5' 2\").    Weight as of this encounter: 56.1 kg (123 lb 11.2 oz).  Physical Exam  GENERAL: healthy, alert and no distress  EYES: Eyes grossly normal to inspection, PERRL and conjunctivae and sclerae normal  HENT: ear canals and TM's normal, nose and mouth without ulcers or lesions  NECK: no adenopathy, no asymmetry, masses, or scars and thyroid normal to palpation  RESP: lungs clear to auscultation - no rales, rhonchi or wheezes  CV: regular rate and rhythm, normal S1 S2, no S3 or S4, no murmur, click or rub, no peripheral edema and peripheral pulses strong  MS: no gross musculoskeletal defects noted, no edema. Right foot third toenail thickening   SKIN: no suspicious lesions or rashes  NEURO: Normal strength and tone, mentation intact and speech normal  PSYCH: mentation appears normal, affect normal/bright    Diagnostic Test Results:  Labs reviewed in Epic  Results for orders placed or performed during the hospital encounter of 08/31/22   MA Screen Bilateral w/Landon     " "Status: None    Narrative    BILATERAL FULL FIELD DIGITAL SCREENING MAMMOGRAM WITH TOMOSYNTHESIS    Performed on: 8/31/22    Compared to: 08/27/2021, 08/21/2020, 03/28/2019, and 03/27/2018    Technique:  This study was evaluated with the assistance of Computer-Aided   Detection.  Breast Tomosynthesis was used in interpretation.    Findings: The breasts are heterogeneously dense, which may obscure small   masses.  There is no radiographic evidence of malignancy.     IMPRESSION: ACR BI-RADS Category 1: Negative    RECOMMENDED FOLLOW-UP: Annual routine screening mammogram    The results and recommendations of this examination will be communicated   to the patient.           ASSESSMENT / PLAN:   (Z00.00) Annual physical exam  (primary encounter diagnosis)  Comment: exam normal       (I10) Primary hypertension  Comment: BP was slightly elevated during clinic visit, patient states her BP usually normal at home, recommended to continue current treatment plan, monitor BP at home and send me home BP readings in my chart   Plan: amLODIPine (NORVASC) 2.5 MG tablet  losartan-hydrochlorothiazide (HYZAAR) 100-25 MG        tablet            (E78.5) Hyperlipidemia LDL goal <100  Comment: did not tolerate statins in the past due to myalgia, LDL level still elevated recommended to start Zetia   Plan: ezetimibe (ZETIA) 10 MG tablet, Lipid panel         reflex to direct LDL Fasting                COUNSELING:  Reviewed preventive health counseling, as reflected in patient instructions       Regular exercise       Healthy diet/nutrition    Estimated body mass index is 22.63 kg/m  as calculated from the following:    Height as of this encounter: 1.575 m (5' 2\").    Weight as of this encounter: 56.1 kg (123 lb 11.2 oz).        She reports that she quit smoking about 41 years ago. Her smoking use included cigarettes. She started smoking about 49 years ago. She has a 4.50 pack-year smoking history. She has never used smokeless " tobacco.      Appropriate preventive services were discussed with this patient, including applicable screening as appropriate for cardiovascular disease, diabetes, osteopenia/osteoporosis, and glaucoma.  As appropriate for age/gender, discussed screening for colorectal cancer, prostate cancer, breast cancer, and cervical cancer. Checklist reviewing preventive services available has been given to the patient.    Reviewed patients plan of care and provided an AVS. The Basic Care Plan (routine screening as documented in Health Maintenance) for Arabella meets the Care Plan requirement. This Care Plan has been established and reviewed with the Patient.    Counseling Resources:  ATP IV Guidelines  Pooled Cohorts Equation Calculator  Breast Cancer Risk Calculator  Breast Cancer: Medication to Reduce Risk  FRAX Risk Assessment  ICSI Preventive Guidelines  Dietary Guidelines for Americans, 2010  USDA's MyPlate  ASA Prophylaxis  Lung CA Screening    THOMAS [facility]    Identified Health Risks:

## 2022-09-19 ENCOUNTER — MYC MEDICAL ADVICE (OUTPATIENT)
Dept: FAMILY MEDICINE | Facility: CLINIC | Age: 72
End: 2022-09-19

## 2022-09-19 DIAGNOSIS — M16.10 HIP ARTHRITIS: Primary | ICD-10-CM

## 2022-09-20 ENCOUNTER — MYC MEDICAL ADVICE (OUTPATIENT)
Dept: FAMILY MEDICINE | Facility: CLINIC | Age: 72
End: 2022-09-20

## 2022-09-20 NOTE — TELEPHONE ENCOUNTER
See MyChart message, patient providing BP update. Routing to PCP for review.    Nuzhat Abbasi RN  [facility]

## 2022-09-21 NOTE — TELEPHONE ENCOUNTER
Good morning!  At my most recent appointment, you asked me to take my blood pressure at home. Here are the readings:  9/3: 128/50  9/4: 130/60  9/6: 124/60  9/8: 120/56  9/9: 134/65  9/13: 120/58  9/15: 124/50  9/17: 124/50      Blood Pressure

## 2022-10-03 ENCOUNTER — MYC MEDICAL ADVICE (OUTPATIENT)
Dept: FAMILY MEDICINE | Facility: CLINIC | Age: 72
End: 2022-10-03

## 2022-10-03 DIAGNOSIS — M54.42 CHRONIC BILATERAL LOW BACK PAIN WITH BILATERAL SCIATICA: Primary | ICD-10-CM

## 2022-10-03 DIAGNOSIS — M54.41 CHRONIC BILATERAL LOW BACK PAIN WITH BILATERAL SCIATICA: Primary | ICD-10-CM

## 2022-10-03 DIAGNOSIS — G89.29 CHRONIC BILATERAL LOW BACK PAIN WITH BILATERAL SCIATICA: Primary | ICD-10-CM

## 2022-10-05 RX ORDER — METHYLPREDNISOLONE 4 MG
TABLET, DOSE PACK ORAL
Qty: 21 TABLET | Refills: 0 | Status: SHIPPED | OUTPATIENT
Start: 2022-10-05 | End: 2023-05-18

## 2022-10-05 RX ORDER — HYDROCODONE BITARTRATE AND ACETAMINOPHEN 5; 325 MG/1; MG/1
1 TABLET ORAL EVERY 6 HOURS PRN
Qty: 18 TABLET | Refills: 0 | Status: SHIPPED | OUTPATIENT
Start: 2022-10-05 | End: 2022-10-19

## 2022-10-06 ENCOUNTER — LAB (OUTPATIENT)
Dept: LAB | Facility: CLINIC | Age: 72
End: 2022-10-06
Payer: COMMERCIAL

## 2022-10-06 ENCOUNTER — OFFICE VISIT (OUTPATIENT)
Dept: ORTHOPEDICS | Facility: CLINIC | Age: 72
End: 2022-10-06

## 2022-10-06 VITALS
DIASTOLIC BLOOD PRESSURE: 80 MMHG | HEIGHT: 62 IN | BODY MASS INDEX: 22.63 KG/M2 | WEIGHT: 123 LBS | SYSTOLIC BLOOD PRESSURE: 167 MMHG

## 2022-10-06 DIAGNOSIS — M79.18 GLUTEAL PAIN: Primary | ICD-10-CM

## 2022-10-06 DIAGNOSIS — M16.11 PRIMARY OSTEOARTHRITIS OF RIGHT HIP: ICD-10-CM

## 2022-10-06 DIAGNOSIS — M25.551 RIGHT HIP PAIN: ICD-10-CM

## 2022-10-06 DIAGNOSIS — M16.10 HIP ARTHRITIS: ICD-10-CM

## 2022-10-06 LAB
CRP SERPL-MCNC: <3 MG/L
ERYTHROCYTE [SEDIMENTATION RATE] IN BLOOD BY WESTERGREN METHOD: 9 MM/HR (ref 0–30)

## 2022-10-06 PROCEDURE — 36415 COLL VENOUS BLD VENIPUNCTURE: CPT

## 2022-10-06 PROCEDURE — 86200 CCP ANTIBODY: CPT

## 2022-10-06 PROCEDURE — 85652 RBC SED RATE AUTOMATED: CPT

## 2022-10-06 PROCEDURE — 86140 C-REACTIVE PROTEIN: CPT

## 2022-10-06 PROCEDURE — 99204 OFFICE O/P NEW MOD 45 MIN: CPT | Performed by: FAMILY MEDICINE

## 2022-10-06 PROCEDURE — 86431 RHEUMATOID FACTOR QUANT: CPT

## 2022-10-06 RX ORDER — PREDNISONE 20 MG/1
40 TABLET ORAL DAILY
Qty: 10 TABLET | Refills: 0 | Status: SHIPPED | OUTPATIENT
Start: 2022-10-06 | End: 2023-05-18

## 2022-10-06 NOTE — PROGRESS NOTES
Arabella Rodríguez  :  1950  DOS: 10/6/2022  MRN: [id number]    Sports Medicine Clinic Visit    PCP: Gloria Escobedo    Arabella Rodríguez is a 72 year old female who is seen as a self referral presenting with acute on chronic radiating low back pain and muscle spasm.    Injury: Reports insidious onset without acute precipitating event that patient first noticed approximately 3 weeks ago.  Pain located over right deep buttocks, radiating to right hamstring.  Reports intermittent radiating, pain and muscle spasms to right hamstring and posterior knee.  Additional Features:  Positive: weakness and muscle tension.  Symptoms are better with Heat, Other medications: Norco and Walking.  Symptoms are worse with: lying supine in bed, waking in AM, sitting.  Other evaluation and/or treatments so far consists of: Ice, Heat, Tylenol, Ibuprofen, Other medications: cyclobenzaprine, Norco, Rest.  Recent imaging completed: No recent imaging completed.  Prior History of related problems: history of similar low back and right hip joint pain the improvement following intra-articular injection in 2019.    Patient notes that muscle spasms started ~ 2 - 3 weeks after starting losartan Rx.    Social History: retired    Review of Systems  Musculoskeletal: as above  Remainder of review of systems is negative including constitutional, CV, pulmonary, GI, Skin and Neurologic except as noted in HPI or medical history.    Past Medical History:   Diagnosis Date     Arthritis Repa Montgomery 2016     Hypertension      Osteopenia      Thyroid nodules [facility]     benign      Past Surgical History:   Procedure Laterality Date     ABDOMEN SURGERY  1986         BIOPSY  Sept.     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     TONSILLECTOMY       Family History   Problem Relation Age of Onset     Lipids Mother      Arthritis Mother      Hypertension Mother      Hyperlipidemia Mother      Osteopenia Mother      Hypertension Father      Hyperlipidemia Father      Prostate Cancer Father      Hypertension Paternal Grandfather      Neurologic Disorder Brother         brain tumor     Other Cancer Brother         1997 Brain Surgery/1997 Brain Surgery     Depression Sister         early 1980s     Depression Niece      Other Cancer Brother         1997 Brain Surgery; 2021 Recurrence     Depression Niece         2007     Depression Sister         early 1980s     Diabetes No family hx of      Thyroid Cancer No family hx of      Thyroid Disease No family hx of        Objective  BP (!) 167/80   Ht 1.575 m (5' 2\")   Wt 55.8 kg (123 lb)   BMI 22.50 kg/m        General: healthy, alert and in no distress      HEENT: no scleral icterus or conjunctival erythema     Skin: no suspicious lesions or rash. No jaundice.     CV: regular rhythm by palpation, 2+ distal pulses, no pedal edema      Resp: normal respiratory effort without conversational dyspnea     Psych: normal mood and affect      Gait: nonantalgic, appropriate coordination and balance     Neuro: normal light touch sensory exam of the extremities. Motor strength as noted below     Low back exam:    Inspection:       no visible deformity in the low back       normal skin       normal vascular       normal lymphatic    ROM:       full flexion       full extension    Non Tender:       midline       paraspinal muscles    Strength:       hip flexion 5/5       knee extension 5/5       ankle dorsiflexion 5/5       ankle plantarflexion 5/5       dorsiflexion of the great toe 5/5    Sensation:      grossly intact throughout lower extremities    Skin:     "   well perfused       capillary refill brisk    Special tests:       straight leg raise left neg        straight leg raise right neg       neg (-) ALLEY        slump test neg    Right hip exam   Flexion limited to 100 degrees, moderately limited internal rotation due to stiffness, neither range of motion produces pain  Negative SARAH HAGER Ober  Moderate pain with palpation of the gluteus medius, piriformis muscle bellies  Mild tenderness to palpation over the ischial bursa  Very mild tenderness with passive stretching of the hamstring      Radiology:  LUMBAR SPINE 2-3 VIEWS  3/28/2019 8:21 AM      HISTORY: Chronic midline low back pain without sciatica.     COMPARISON: None.                                                                      IMPRESSION: Alignment of the lumbar spine is within normal limits. No  loss of vertebral body height. Moderate degenerative changes and loss  of intervertebral disc height throughout the lumbar spine most  pronounced at the L3-L4 level.      Marked degenerative changes in the hips right greater than left.    PELVIS WITH UNILATERAL HIP ONE VIEW RIGHT  3/28/2019 8:19 AM      HISTORY: Chronic midline low back pain without sciatica. Acute right  hip pain.     COMPARISON: None.     FINDINGS: Moderate to severe loss of joint space and spurring in the  right hip. Mild-moderate left hip spurring. There is no acute fracture  or dislocation. There are no worrisome bony lesions.                                                                      IMPRESSION: No acute osseous abnormality demonstrated.    Assessment:  1. Gluteal pain    2. Right hip pain    3. Primary osteoarthritis of right hip        Plan:  Discussed the assessment with the patient.  Follow up: As needed based on clinical progress  Acute pain in the gluteal muscles and much milder over the ischial bursa on exam today  Quite stiff and limited with hip joint range of motion due to underlying osteoarthritis but no  significant pain with provocative maneuvers for the hip joint  Also no signs of pain referring from the low back, exam reassuring  Is possible the muscles are becoming irritated due to the limitations of the hip joint even though the hip joint is not painful  Advise she could try a prednisone which is helped for her in the past, we reviewed the limitations and risks in detail today  Physical therapy order reviewed to help with home exercise program progression and manual therapies if helpful  Could still consider diagnostic injection to the hip joint in the future to help better identify pain generator, versus trigger point injections into the gluteal muscles, those are both deferred for today  Can continue to use muscle relaxer if helpful  XR images independently visualized and reviewed with patient today in clinic  We discussed modified progressive pain-free activity as tolerated  Home handouts provided and supportive care reviewed  All questions were answered today  Contact us with additional questions or concerns  Signs and sx of concern reviewed      Valdo Olsen DO, ABDOULAYE  Sports Medicine Physician  [facility]      Time spent in chart review, one-on-one evaluation, discussion with patient regarding: nature of problem, clinical course, prior treatments, therapeutic options, shared-decision making, potential procedures and referrals, and charting related to the visit: 32 minutes.  If applicable, time does not include time spent performing any procedure.            Disclaimer: This note consists of symbols derived from keyboarding, dictation and/or voice recognition software. As a result, there may be errors in the script that have gone undetected. Please consider this when interpreting information found in this chart.

## 2022-10-06 NOTE — LETTER
10/6/2022         RE: Arabella Rodríguez  [address] [place]  [place] [address]        Dear Colleague,    Thank you for referring your patient, Arabella Rodríguez, to the [facility] [place]. Please see a copy of my visit note below.    Arabella Rodríguez  :  1950  DOS: 10/6/2022  MRN: [id number]    Sports Medicine Clinic Visit    PCP: Gloria Escobedo    Arabella Rodríguez is a 72 year old female who is seen as a self referral presenting with acute on chronic radiating low back pain and muscle spasm.    Injury: Reports insidious onset without acute precipitating event that patient first noticed approximately 3 weeks ago.  Pain located over right deep buttocks, radiating to right hamstring.  Reports intermittent radiating, pain and muscle spasms to right hamstring and posterior knee.  Additional Features:  Positive: weakness and muscle tension.  Symptoms are better with Heat, Other medications: Norco and Walking.  Symptoms are worse with: lying supine in bed, waking in AM, sitting.  Other evaluation and/or treatments so far consists of: Ice, Heat, Tylenol, Ibuprofen, Other medications: cyclobenzaprine, Norco, Rest.  Recent imaging completed: No recent imaging completed.  Prior History of related problems: history of similar low back and right hip joint pain the improvement following intra-articular injection in 2019.    Patient notes that muscle spasms started ~ 2 - 3 weeks after starting losartan Rx.    Social History: retired    Review of Systems  Musculoskeletal: as above  Remainder of review of systems is negative including constitutional, CV, pulmonary, GI, Skin and Neurologic except as noted in HPI or medical history.    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     TONSILLECTOMY       Family History   Problem Relation Age of Onset     Lipids Mother      Arthritis Mother      Hypertension Mother      Hyperlipidemia Mother      Osteopenia Mother      Hypertension Father      Hyperlipidemia Father      Prostate Cancer Father      Hypertension Paternal Grandfather      Neurologic Disorder Brother         brain tumor     Other Cancer Brother          Brain Surgery/ Brain Surgery     Depression Sister         early      Depression Niece      Other Cancer Brother          Brain Surgery;  Recurrence     Depression Niece              Depression Sister         early      Diabetes No family hx of      Thyroid Cancer No family hx of      Thyroid Disease No family hx of        Objective  BP (!) 167/80   Ht 1.575 m (5' 2\")   Wt 55.8 kg (123 lb)   BMI 22.50 kg/m        General: healthy, alert and in no distress      HEENT: no scleral icterus or conjunctival erythema     Skin: no suspicious lesions or rash. No jaundice.     CV: regular rhythm by palpation, 2+ distal pulses, no pedal edema      Resp: normal respiratory effort without conversational dyspnea     Psych: normal mood and affect      Gait: nonantalgic, appropriate coordination and balance     Neuro: normal light touch sensory exam of the extremities. Motor strength as noted below     Low back exam:    Inspection:       no visible deformity in the low back       normal skin       normal vascular       normal lymphatic    ROM:       full flexion       full extension    Non " Tender:       midline       paraspinal muscles    Strength:       hip flexion 5/5       knee extension 5/5       ankle dorsiflexion 5/5       ankle plantarflexion 5/5       dorsiflexion of the great toe 5/5    Sensation:      grossly intact throughout lower extremities    Skin:       well perfused       capillary refill brisk    Special tests:       straight leg raise left neg        straight leg raise right neg       neg (-) ALLEY        slump test neg    Right hip exam   Flexion limited to 100 degrees, moderately limited internal rotation due to stiffness, neither range of motion produces pain  Negative SARAH HAGER Ober  Moderate pain with palpation of the gluteus medius, piriformis muscle bellies  Mild tenderness to palpation over the ischial bursa  Very mild tenderness with passive stretching of the hamstring      Radiology:  LUMBAR SPINE 2-3 VIEWS  3/28/2019 8:21 AM      HISTORY: Chronic midline low back pain without sciatica.     COMPARISON: None.                                                                      IMPRESSION: Alignment of the lumbar spine is within normal limits. No  loss of vertebral body height. Moderate degenerative changes and loss  of intervertebral disc height throughout the lumbar spine most  pronounced at the L3-L4 level.      Marked degenerative changes in the hips right greater than left.    PELVIS WITH UNILATERAL HIP ONE VIEW RIGHT  3/28/2019 8:19 AM      HISTORY: Chronic midline low back pain without sciatica. Acute right  hip pain.     COMPARISON: None.     FINDINGS: Moderate to severe loss of joint space and spurring in the  right hip. Mild-moderate left hip spurring. There is no acute fracture  or dislocation. There are no worrisome bony lesions.                                                                      IMPRESSION: No acute osseous abnormality demonstrated.    Assessment:  1. Gluteal pain    2. Right hip pain    3. Primary osteoarthritis of right hip         Plan:  Discussed the assessment with the patient.  Follow up: As needed based on clinical progress  Acute pain in the gluteal muscles and much milder over the ischial bursa on exam today  Quite stiff and limited with hip joint range of motion due to underlying osteoarthritis but no significant pain with provocative maneuvers for the hip joint  Also no signs of pain referring from the low back, exam reassuring  Is possible the muscles are becoming irritated due to the limitations of the hip joint even though the hip joint is not painful  Advise she could try a prednisone which is helped for her in the past, we reviewed the limitations and risks in detail today  Physical therapy order reviewed to help with home exercise program progression and manual therapies if helpful  Could still consider diagnostic injection to the hip joint in the future to help better identify pain generator, versus trigger point injections into the gluteal muscles, those are both deferred for today  Can continue to use muscle relaxer if helpful  XR images independently visualized and reviewed with patient today in clinic  We discussed modified progressive pain-free activity as tolerated  Home handouts provided and supportive care reviewed  All questions were answered today  Contact us with additional questions or concerns  Signs and sx of concern reviewed      Valdo Olsen DO, ABDOULAYE  Sports Medicine Physician  [facility]      Time spent in chart review, one-on-one evaluation, discussion with patient regarding: nature of problem, clinical course, prior treatments, therapeutic options, shared-decision making, potential procedures and referrals, and charting related to the visit: 32 minutes.  If applicable, time does not include time spent performing any procedure.            Disclaimer: This note consists of symbols derived from keyboarding, dictation and/or voice recognition software. As a result, there  may be errors in the script that have gone undetected. Please consider this when interpreting information found in this chart.        Again, thank you for allowing me to participate in the care of your patient.        Sincerely,        Valdo Olsen, DO

## 2022-10-07 LAB — RHEUMATOID FACT SER NEPH-ACNC: 16 IU/ML

## 2022-10-10 LAB — CCP AB SER IA-ACNC: 0.5 U/ML

## 2022-10-12 ENCOUNTER — OFFICE VISIT (OUTPATIENT)
Dept: DERMATOLOGY | Facility: CLINIC | Age: 72
End: 2022-10-12
Payer: COMMERCIAL

## 2022-10-12 DIAGNOSIS — R09.81 NASAL CONGESTION: ICD-10-CM

## 2022-10-12 DIAGNOSIS — L81.4 LENTIGO: Primary | ICD-10-CM

## 2022-10-12 DIAGNOSIS — L82.1 SEBORRHEIC KERATOSIS: ICD-10-CM

## 2022-10-12 DIAGNOSIS — D23.9 DERMAL NEVUS: ICD-10-CM

## 2022-10-12 DIAGNOSIS — D18.01 ANGIOMA OF SKIN: ICD-10-CM

## 2022-10-12 PROCEDURE — 99213 OFFICE O/P EST LOW 20 MIN: CPT | Performed by: DERMATOLOGY

## 2022-10-12 ASSESSMENT — PAIN SCALES - GENERAL: PAINLEVEL: NO PAIN (0)

## 2022-10-12 NOTE — LETTER
10/12/2022         RE: Arabella Rodríguez  [address] [place]  [facility] [address]        Dear Colleague,    Thank you for referring your patient, Arabella Rodríguez, to the [facility]. Please see a copy of my visit note below.    Arabella Rodríguez is an extremely pleasant 72 year old year old female patient here today for spots on skin.   .   Patient states this has been present for a while.  Patient reports the following symptoms:  none.  Patient reports the following previous treatments none.  These treatments did not work.  Patient reports the following modifying factors none.  Associated symptoms: none.  Patient has no other skin complaints today.  Remainder of the HPI, Meds, PMH, Allergies, FH, and SH was reviewed in chart.      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     TONSILLECTOMY          Family History   Problem Relation Age of Onset     Lipids Mother      Arthritis Mother      Hypertension Mother      Hyperlipidemia Mother      Osteopenia Mother      Hypertension Father      Hyperlipidemia Father      Prostate Cancer Father      Hypertension Paternal Grandfather      Neurologic Disorder Brother         brain tumor     Other Cancer Brother          Brain Surgery/ Brain Surgery     Depression Sister         early      Depression Niece       Other Cancer Brother          Brain Surgery;  Recurrence     Depression Niece         2007     Depression Sister         early      Diabetes No family hx of      Thyroid Cancer No family hx of      Thyroid Disease No family hx of        Social History     Socioeconomic History     Marital status:      Spouse name: Not on file     Number of children: Not on file     Years of education: Not on file     Highest education level: Not on file   Occupational History     Employer: [employer]   Tobacco Use     Smoking status: Former     Packs/day: 0.50     Years: 9.00     Pack years: 4.50     Types: Cigarettes     Start date: 10/1/1972     Quit date: 1981     Years since quittin.4     Smokeless tobacco: Never     Tobacco comments:     I quit in    Substance and Sexual Activity     Alcohol use: Yes     Comment: 1 o 2 then switch  to na     Drug use: No     Sexual activity: Not Currently     Partners: Male     Birth control/protection: Post-menopausal   Other Topics Concern     Parent/sibling w/ CABG, MI or angioplasty before 65F 55M? No      Service No     Blood Transfusions No     Caffeine Concern Yes     Comment: 3 cups cofffee a day     Occupational Exposure No     Hobby Hazards No     Sleep Concern No     Stress Concern No     Weight Concern No     Special Diet Yes     Comment: calcium with D one a day     Back Care No     Exercise Yes     Comment: couple times a wk     Bike Helmet No     Seat Belt Yes     Self-Exams No   Social History Narrative     Not on file     Social Determinants of Health     Financial Resource Strain: Not on file   Food Insecurity: Not on file   Transportation Needs: Not on file   Physical Activity: Not on file   Stress: Not on file   Social Connections: Not on file   Intimate Partner Violence: Not on file   Housing Stability: Not on file       Outpatient Encounter Medications as of 10/12/2022   Medication Sig Dispense Refill     amLODIPine  (NORVASC) 2.5 MG tablet Take 1 tablet (2.5 mg) by mouth daily 90 tablet 3     CALCIUM + D OR 1 TABLET ORALLY DAILY       cyclobenzaprine (FLEXERIL) 10 MG tablet Take 0.5-1 tablets (5-10 mg) by mouth 3 times daily as needed for muscle spasms (medication may make you drowsy, try in evening at first) 30 tablet 0     ezetimibe (ZETIA) 10 MG tablet Take 1 tablet (10 mg) by mouth daily 90 tablet 3     fluticasone (FLONASE) 50 MCG/ACT nasal spray INHALE 1-2 SPRAYS INTO EACH NOSTRIL ONCE DAILY 48 mL 1     HYDROcodone-acetaminophen (NORCO) 5-325 MG tablet Take 1 tablet by mouth every 6 hours as needed for pain 18 tablet 0     losartan-hydrochlorothiazide (HYZAAR) 100-25 MG tablet Take 1 tablet by mouth daily 90 tablet 3     methylPREDNISolone (MEDROL DOSEPAK) 4 MG tablet therapy pack Follow Package Directions 21 tablet 0     MULTI-VITAMIN OR 1 TABLET ORALLY DAILY       predniSONE (DELTASONE) 20 MG tablet Take 2 tablets (40 mg) by mouth daily 10 tablet 0     triamcinolone (KENALOG) 0.1 % external cream Apply topically 2 times daily (Patient not taking: No sig reported) 30 g 0     valACYclovir (VALTREX) 1000 mg tablet IF YOU HAVE A COLD SORE USE 2 TABLETS BY MOUTH TWO TIMES A DAY FOR 2 DAYS.  IF YOU HAVE THE RASH ON YOUR LEG TAKE 1/2 TABLET TWO TIMES A DAY FOR 3 DAYS 30 tablet 1     No facility-administered encounter medications on file as of 10/12/2022.             O:   NAD, WDWN, Alert & Oriented, Mood & Affect wnl, Vitals stable   Here today alone   General appearance normal   Vitals stable   Alert, oriented and in no acute distress      Following lymph nodes palpated: Occipital, Cervical, Supraclavicular no lad      Stuck on papules and brown macules on trunk and ext   Red papules on trunk  Flesh colored papules on trunk     The remainder of the full exam was normal; the following areas were examined:  conjunctiva/lids, oral mucosa, neck, peripheral vascular system, abdomen, lymph nodes, digits/nails, eccrine and apocrine  glands, scalp/hair, face, neck, chest, abdomen, buttocks, back, RUE, LUE, RLE, LLE       Eyes: Conjunctivae/lids:Normal     ENT: Lips, buccal mucosa, tongue: normal    MSK:Normal    Cardiovascular: peripheral edema none    Pulm: Breathing Normal    Lymph Nodes: No Head and Neck Lymphadenopathy     Neuro/Psych: Orientation:Alert and Orientedx3 ; Mood/Affect:normal       A/P:  1. Seborrheic keratosis, lentigo, angioma, dermal nevus  It was a pleasure speaking to Arabella Rodríguez today.  Previous clinic notes and pertinent laboratory tests were reviewed prior to Arabella Rodríguez's visit.  Nature of benign skin lesions dicussed with patient.  Patient encouraged to perform monthly skin exams.  UV precautions reviewed with patient.  Return to clinic 12 months        Again, thank you for allowing me to participate in the care of your patient.        Sincerely,        Elkin Hooks MD

## 2022-10-12 NOTE — TELEPHONE ENCOUNTER
"Last Written Prescription Date:  9/30/21  Last Fill Quantity: 48,  # refills: 1   Last office visit provider:  8/31/22     Requested Prescriptions   Pending Prescriptions Disp Refills     fluticasone (FLONASE) 50 MCG/ACT nasal spray [Pharmacy Med Name: FLUTICASONE PROP 50 MCG SPRAY] 48 mL 1     Sig: INHALE 1-2 SPRAYS INTO EACH NOSTRIL ONCE DAILY       Nasal Allergy Protocol Passed - 10/12/2022 11:00 AM        Passed - Patient is age 12 or older        Passed - Recent (12 mo) or future (30 days) visit within the authorizing provider's specialty     Patient has had an office visit with the authorizing provider or a provider within the authorizing providers department within the previous 12 mos or has a future within next 30 days. See \"Patient Info\" tab in inbasket, or \"Choose Columns\" in Meds & Orders section of the refill encounter.              Passed - Medication is active on med list             Daphne Welch RN 10/12/22 5:26 PM  "

## 2022-10-12 NOTE — PROGRESS NOTES
Arabella Rodríguez is an extremely pleasant 72 year old year old female patient here today for spots on skin.   .   Patient states this has been present for a while.  Patient reports the following symptoms:  none.  Patient reports the following previous treatments none.  These treatments did not work.  Patient reports the following modifying factors none.  Associated symptoms: none.  Patient has no other skin complaints today.  Remainder of the HPI, Meds, PMH, Allergies, FH, and SH was reviewed in chart.      Past Medical History:   Diagnosis Date     Arthritis Repa [place] 2016     Hypertension      Osteopenia      Thyroid nodules [facility]     benign        Past Surgical History:   Procedure Laterality Date     ABDOMEN SURGERY  1986         BIOPSY  2014    Thyroid     C ANESTH, SECTION       COLONOSCOPY       COLONOSCOPY N/A 2018    Procedure: COLONOSCOPY;  colonoscopy;  Surgeon: Xu Feliciano MD;  Location: [facility]     EXCISE MASS WRIST Left 2016    Procedure: EXCISE MASS WRIST;  Surgeon: Germain Hull MD;  Location: [facility] [facility]     ORTHOPEDIC SURGERY  2016    I had a benign tumor removed from my wrist.     TONSILLECTOMY          Family History   Problem Relation Age of Onset     Lipids Mother      Arthritis Mother      Hypertension Mother      Hyperlipidemia Mother      Osteopenia Mother      Hypertension Father      Hyperlipidemia Father      Prostate Cancer Father      Hypertension Paternal Grandfather      Neurologic Disorder Brother         brain tumor     Other Cancer Brother          Brain Surgery/ Brain Surgery     Depression Sister         early      Depression Niece      Other Cancer Brother          Brain Surgery;  Recurrence     Depression Niece              Depression Sister         early      Diabetes No family hx of      Thyroid Cancer No family hx of      Thyroid Disease No family hx of        Social History      Socioeconomic History     Marital status:      Spouse name: Not on file     Number of children: Not on file     Years of education: Not on file     Highest education level: Not on file   Occupational History     Employer: [employer]   Tobacco Use     Smoking status: Former     Packs/day: 0.50     Years: 9.00     Pack years: 4.50     Types: Cigarettes     Start date: 10/1/1972     Quit date: 1981     Years since quittin.4     Smokeless tobacco: Never     Tobacco comments:     I quit in    Substance and Sexual Activity     Alcohol use: Yes     Comment: 1 o 2 then switch  to na     Drug use: No     Sexual activity: Not Currently     Partners: Male     Birth control/protection: Post-menopausal   Other Topics Concern     Parent/sibling w/ CABG, MI or angioplasty before 65F 55M? No      Service No     Blood Transfusions No     Caffeine Concern Yes     Comment: 3 cups cofffee a day     Occupational Exposure No     Hobby Hazards No     Sleep Concern No     Stress Concern No     Weight Concern No     Special Diet Yes     Comment: calcium with D one a day     Back Care No     Exercise Yes     Comment: couple times a wk     Bike Helmet No     Seat Belt Yes     Self-Exams No   Social History Narrative     Not on file     Social Determinants of Health     Financial Resource Strain: Not on file   Food Insecurity: Not on file   Transportation Needs: Not on file   Physical Activity: Not on file   Stress: Not on file   Social Connections: Not on file   Intimate Partner Violence: Not on file   Housing Stability: Not on file       Outpatient Encounter Medications as of 10/12/2022   Medication Sig Dispense Refill     amLODIPine (NORVASC) 2.5 MG tablet Take 1 tablet (2.5 mg) by mouth daily 90 tablet 3     CALCIUM + D OR 1 TABLET ORALLY DAILY       cyclobenzaprine (FLEXERIL) 10 MG tablet Take 0.5-1 tablets (5-10 mg) by mouth 3 times daily as needed for muscle spasms (medication may make  you drowsy, try in evening at first) 30 tablet 0     ezetimibe (ZETIA) 10 MG tablet Take 1 tablet (10 mg) by mouth daily 90 tablet 3     fluticasone (FLONASE) 50 MCG/ACT nasal spray INHALE 1-2 SPRAYS INTO EACH NOSTRIL ONCE DAILY 48 mL 1     HYDROcodone-acetaminophen (NORCO) 5-325 MG tablet Take 1 tablet by mouth every 6 hours as needed for pain 18 tablet 0     losartan-hydrochlorothiazide (HYZAAR) 100-25 MG tablet Take 1 tablet by mouth daily 90 tablet 3     methylPREDNISolone (MEDROL DOSEPAK) 4 MG tablet therapy pack Follow Package Directions 21 tablet 0     MULTI-VITAMIN OR 1 TABLET ORALLY DAILY       predniSONE (DELTASONE) 20 MG tablet Take 2 tablets (40 mg) by mouth daily 10 tablet 0     triamcinolone (KENALOG) 0.1 % external cream Apply topically 2 times daily (Patient not taking: No sig reported) 30 g 0     valACYclovir (VALTREX) 1000 mg tablet IF YOU HAVE A COLD SORE USE 2 TABLETS BY MOUTH TWO TIMES A DAY FOR 2 DAYS.  IF YOU HAVE THE RASH ON YOUR LEG TAKE 1/2 TABLET TWO TIMES A DAY FOR 3 DAYS 30 tablet 1     No facility-administered encounter medications on file as of 10/12/2022.             O:   NAD, WDWN, Alert & Oriented, Mood & Affect wnl, Vitals stable   Here today alone   General appearance normal   Vitals stable   Alert, oriented and in no acute distress      Following lymph nodes palpated: Occipital, Cervical, Supraclavicular no lad      Stuck on papules and brown macules on trunk and ext   Red papules on trunk  Flesh colored papules on trunk     The remainder of the full exam was normal; the following areas were examined:  conjunctiva/lids, oral mucosa, neck, peripheral vascular system, abdomen, lymph nodes, digits/nails, eccrine and apocrine glands, scalp/hair, face, neck, chest, abdomen, buttocks, back, RUE, LUE, RLE, LLE       Eyes: Conjunctivae/lids:Normal     ENT: Lips, buccal mucosa, tongue: normal    MSK:Normal    Cardiovascular: peripheral edema none    Pulm: Breathing Normal    Lymph Nodes:  No Head and Neck Lymphadenopathy     Neuro/Psych: Orientation:Alert and Orientedx3 ; Mood/Affect:normal       A/P:  1. Seborrheic keratosis, lentigo, angioma, dermal nevus  It was a pleasure speaking to Arabella Rodríguez today.  Previous clinic notes and pertinent laboratory tests were reviewed prior to Arabella Rodríguez's visit.  Nature of benign skin lesions dicussed with patient.  Patient encouraged to perform monthly skin exams.  UV precautions reviewed with patient.  Return to clinic 12 months     SBO (small bowel obstruction)

## 2022-10-12 NOTE — NURSING NOTE
Chief Complaint   Patient presents with     Skin Check     No Concerns       There were no vitals filed for this visit.  Wt Readings from Last 1 Encounters:   10/06/22 55.8 kg (123 lb)       Blanca Valdez LPN .................10/12/2022

## 2022-10-13 RX ORDER — FLUTICASONE PROPIONATE 50 MCG
SPRAY, SUSPENSION (ML) NASAL
Qty: 48 ML | Refills: 1 | Status: SHIPPED | OUTPATIENT
Start: 2022-10-13 | End: 2023-04-12

## 2022-10-17 DIAGNOSIS — Z78.0 POSTMENOPAUSAL STATUS: ICD-10-CM

## 2022-10-17 DIAGNOSIS — M85.9 LOW BONE DENSITY: Primary | ICD-10-CM

## 2022-10-27 ENCOUNTER — HOSPITAL ENCOUNTER (OUTPATIENT)
Dept: PHYSICAL THERAPY | Facility: CLINIC | Age: 72
Setting detail: THERAPIES SERIES
Discharge: HOME | End: 2022-10-27
Attending: FAMILY MEDICINE
Payer: COMMERCIAL

## 2022-10-27 DIAGNOSIS — M25.551 RIGHT HIP PAIN: ICD-10-CM

## 2022-10-27 DIAGNOSIS — M16.11 PRIMARY OSTEOARTHRITIS OF RIGHT HIP: ICD-10-CM

## 2022-10-27 DIAGNOSIS — M79.18 GLUTEAL PAIN: ICD-10-CM

## 2022-10-27 PROCEDURE — 97110 THERAPEUTIC EXERCISES: CPT | Mod: GP | Performed by: PHYSICAL THERAPIST

## 2022-10-27 PROCEDURE — 97161 PT EVAL LOW COMPLEX 20 MIN: CPT | Mod: GP | Performed by: PHYSICAL THERAPIST

## 2022-10-27 NOTE — PROGRESS NOTES
[facility]    OUTPATIENT PHYSICAL THERAPY ORTHOPEDIC EVALUATION  PLAN OF TREATMENT FOR OUTPATIENT REHABILITATION  (COMPLETE FOR INITIAL CLAIMS ONLY)  Patient's Last Name, First Name, M.I.  YOB: 1950  Arabella Rodríguez    Provider s Name:  [facility]   Medical Record No.  [id number]   Start of Care Date:  10/27/22   Onset Date:  06/28/22   Type:     _X__PT   ___OT   ___SLP Medical Diagnosis:  Gluteal pain (M79.18)     Right hip pain (M25.551)     Primary osteoarthritis of right hip (M16.11)     PT Diagnosis:  R hip pain   Visits from SOC:  1      _________________________________________________________________________________  Plan of Treatment/Functional Goals:              Goals  Goal Identifier: 1  Goal Description: Patient will report pain 2/10 or less in the evening with sitting.  Target Date: 01/05/23    Goal Identifier: 2  Goal Description: Patient will be able to resume regular strength training and walking programs for fitness without increased pain.  Target Date: 01/05/23    Goal Identifier: 3  Goal Description: Patient will be independent and consistent with HEP 5x a week to aid function recovery.  Target Date: 01/05/23                Therapy Frequency:  1 time/week  Predicted Duration of Therapy Intervention:  10 weeks    Niurka Delgado, PT                 I CERTIFY THE NEED FOR THESE SERVICES FURNISHED UNDER        THIS PLAN OF TREATMENT AND WHILE UNDER MY CARE     (Physician co-signature of this document indicates review and certification of the therapy plan).                       Certification Date From:  10/27/22   Certification Date To:  01/05/23    Referring Provider:  Valdo Olsen,     Initial Assessment        See Epic Evaluation Start of Care Date: 10/27/22

## 2022-10-27 NOTE — PROGRESS NOTES
PHYSICAL THERAPY INITIAL EVALUATION  10/27/22 1000   General Information   Type of Visit Initial OP Ortho PT Evaluation   Start of Care Date 10/27/22   Referring Physician Valdo Olsen, DO   Patient/Family Goals Statement get back normal fitness routine, resume walking program   Orders Evaluate and Treat   Orders Comment acute on chronic right hip pain, mostly gluteal pain, suspect compensatory for known hip OA/ROM limitations, although hip joint not painful today on exam, progressive HEP work and manual therpaies as needed   Date of Order 10/06/22   Certification Required? Yes   Medical Diagnosis Gluteal pain (M79.18)     Right hip pain (M25.551)     Primary osteoarthritis of right hip (M16.11)   Surgical/Medical history reviewed Yes   Precautions/Limitations no known precautions/limitations   Body Part(s)   Body Part(s) Lumbar Spine/SI;Hip   Presentation and Etiology   Pertinent history of current problem (include personal factors and/or comorbidities that impact the POC) Initial injury from lifting something too heavy in early July. Ended up in the ED and it was taken care of. In late August started taking a cholesterol drug and was starting to having sharp pains in the right leg but it aggravated the previous injury. States the muscle spasms have now stopped. Has been able to stop the muscle relaxers and pain meds but still taking Advil at night.   Impairments A. Pain   Functional Limitations perform activities of daily living;perform desired leisure / sports activities   Symptom Location R ischial tuberosity, at times can hurt behind the knee and ankle   How/Where did it occur While lifting   Onset date of current episode/exacerbation 06/28/22   Chronicity New   Pain rating (0-10 point scale) Best (/10);Worst (/10)   Best (/10) 0   Worst (/10) 8   Pain quality B. Dull   Frequency of pain/symptoms D. Other   Pain frequency comment usually in the evening when relaxing at night, sometimes stiff in  the morning   Pain/symptoms exacerbated by A. Sitting   Pain/symptoms eased by E. Changing positions;I. OTC medication(s)   Progression of symptoms since onset: Improved   Current / Previous Interventions   Diagnostic Tests: X-ray   X-ray Results Results   X-ray results 3/28/19: FINDINGS: Moderate to severe loss of joint space and spurring in the  right hip. Mild-moderate left hip spurring. There is no acute fracture  or dislocation. There are no worrisome bony lesions.   Prior Level of Function   Prior Level of Function-Mobility independent, virtual gym ([employer]) - low impact cardio, lifting (6# weights)   Current Level of Function   Patient role/employment history F. Retired   Current equipment-Gait/Locomotion None   Fall Risk Screen   Fall screen completed by PT   Have you fallen 2 or more times in the past year? No   Have you fallen and had an injury in the past year? No   Is patient a fall risk? No   Abuse Screen (yes response referral indicated)   Feels Unsafe at Home or Work/School no   Feels Threatened by Someone no   Does Anyone Try to Keep You From Having Contact with Others or Doing Things Outside Your Home? no   Physical Signs of Abuse Present no   Hip Objective Findings   Gait/Locomotion WNL   Side (if bilateral, select both right and left) Right;Left   Lumbar ROM Lumbar flexion-fingertips to 1 inch above toes. Extension 50%, a small twinge with returning to neutral. SB fingertips to inferior pole of patella B.   Pelvic Screen - SI gapping, - SI compression, - sacral thrust, - thigh thrust, - supine to sit   Gluteal Tendopathy Test + for glute medius   Straight Leg Raise Test + R for pain in ischial tubersity, no pain with 90/90 hamstring testing   Scour Test -   ALLEY Test + for pain at R buttock   FADIR Test -   Hip Special Tests Comments - spring test lumbar spine   Palpation tender R glut medius muscle belly, piriformis, R sacral border inferior. tender L piriformis.   Right Hamstring  Flexibility WNL   Left Hamstring Flexibility mild tight   Right Piriformis Flexibility tight   Left Piriformis Flexibility mild tight   Right Prone Quad Flexibility WNL   Left Prone Quad Flexibility WNL   Right Hip Flexion PROM 110   Left Hip Flexion PROM 120   Right Hip ER PROM 50   Left Hip ER PROM 60   Right Hip IR PROM 20   Left Hip IR PROM 20   Right Hip Flexion Strength 5   Left Hip Flexion Strength 4   Right Hip Abduction Strength 3/5, very painful   Left Hip Abduction Strength 5   Right Hip IR Strength 5-/5   Left Hip IR Strength 4   Right Hip ER Strength 4   Left Hip ER Strength 4   Right Knee Flexion Strength 5   Left Knee Flexion Strength 5   Right Knee Extension Strength 5   Left Knee Extension Strength 5   Clinical Impression   Criteria for Skilled Therapeutic Interventions Met yes, treatment indicated   PT Diagnosis R hip pain   Influenced by the following impairments pain, decreased ROM, decreased flexibility, decreased strength   Functional limitations due to impairments sitting, certain positions, prolonged walking   Clinical Presentation Stable/Uncomplicated   Clinical Presentation Rationale improving sx, known mod-severe OA R hip   Clinical Decision Making (Complexity) Low complexity   Therapy Frequency 1 time/week   Predicted Duration of Therapy Intervention (days/wks) 10 weeks   Risk & Benefits of therapy have been explained Yes   Patient, Family & other staff in agreement with plan of care Yes   Education Assessment   Preferred Learning Style Listening;Demonstration;Pictures/video   Barriers to Learning No barriers   ORTHO GOALS   PT Ortho Eval Goals 1;2;3   Ortho Goal 1   Goal Identifier 1   Goal Description Patient will report pain 2/10 or less in the evening with sitting.   Target Date 01/05/23   Ortho Goal 2   Goal Identifier 2   Goal Description Patient will be able to resume regular strength training and walking programs for fitness without increased pain.   Target Date 01/05/23   Ortho  Goal 3   Goal Identifier 3   Goal Description Patient will be independent and consistent with HEP 5x a week to aid function recovery.   Target Date 01/05/23   Total Evaluation Time   PT Eval, Low Complexity Minutes (29048) 30   Therapy Certification   Certification date from 10/27/22   Certification date to 01/05/23   Medical Diagnosis Gluteal pain (M79.18)     Right hip pain (M25.551)     Primary osteoarthritis of right hip (M16.11)       Please contact me with any questions or concerns.  Thank you for your referral.    Niurka Delgado, PT, DPT, OCS  Physical Therapist, Orthopedic Certified Specialist    [facility]  [address] [address]  [email or web address]   Office: [phone]   Employed by [employer]

## 2022-11-03 ENCOUNTER — HOSPITAL ENCOUNTER (OUTPATIENT)
Dept: PHYSICAL THERAPY | Facility: CLINIC | Age: 72
Setting detail: THERAPIES SERIES
Discharge: HOME | End: 2022-11-03
Attending: FAMILY MEDICINE
Payer: COMMERCIAL

## 2022-11-03 PROCEDURE — 97140 MANUAL THERAPY 1/> REGIONS: CPT | Mod: GP | Performed by: PHYSICAL THERAPIST

## 2022-11-03 PROCEDURE — 97110 THERAPEUTIC EXERCISES: CPT | Mod: GP | Performed by: PHYSICAL THERAPIST

## 2022-11-10 ENCOUNTER — HOSPITAL ENCOUNTER (OUTPATIENT)
Dept: PHYSICAL THERAPY | Facility: CLINIC | Age: 72
Setting detail: THERAPIES SERIES
Discharge: HOME | End: 2022-11-10
Attending: FAMILY MEDICINE
Payer: COMMERCIAL

## 2022-11-10 PROCEDURE — 97140 MANUAL THERAPY 1/> REGIONS: CPT | Mod: GP | Performed by: PHYSICAL THERAPIST

## 2022-11-10 PROCEDURE — 97110 THERAPEUTIC EXERCISES: CPT | Mod: GP | Performed by: PHYSICAL THERAPIST

## 2022-12-01 ENCOUNTER — HOSPITAL ENCOUNTER (OUTPATIENT)
Dept: PHYSICAL THERAPY | Facility: CLINIC | Age: 72
Setting detail: THERAPIES SERIES
Discharge: HOME | End: 2022-12-01
Attending: FAMILY MEDICINE
Payer: COMMERCIAL

## 2022-12-01 PROCEDURE — 97140 MANUAL THERAPY 1/> REGIONS: CPT | Mod: GP | Performed by: PHYSICAL THERAPIST

## 2022-12-01 PROCEDURE — 97110 THERAPEUTIC EXERCISES: CPT | Mod: GP | Performed by: PHYSICAL THERAPIST

## 2022-12-06 ENCOUNTER — HOSPITAL ENCOUNTER (OUTPATIENT)
Dept: BONE DENSITY | Facility: CLINIC | Age: 72
Discharge: HOME | End: 2022-12-06
Payer: COMMERCIAL

## 2022-12-06 DIAGNOSIS — Z78.0 POSTMENOPAUSAL STATUS: ICD-10-CM

## 2022-12-06 DIAGNOSIS — M85.9 LOW BONE DENSITY: ICD-10-CM

## 2022-12-06 PROCEDURE — 77080 DXA BONE DENSITY AXIAL: CPT

## 2022-12-08 ENCOUNTER — HOSPITAL ENCOUNTER (OUTPATIENT)
Dept: PHYSICAL THERAPY | Facility: CLINIC | Age: 72
Setting detail: THERAPIES SERIES
Discharge: HOME | End: 2022-12-08
Attending: FAMILY MEDICINE
Payer: COMMERCIAL

## 2022-12-08 PROCEDURE — 97110 THERAPEUTIC EXERCISES: CPT | Mod: GP | Performed by: PHYSICAL THERAPIST

## 2022-12-08 PROCEDURE — 97140 MANUAL THERAPY 1/> REGIONS: CPT | Mod: GP | Performed by: PHYSICAL THERAPIST

## 2022-12-13 NOTE — PROGRESS NOTES
Chart check  12/26/22 DXA stated to be from [place] but hidden in nil read  Lowest T-score -1.5 left hip femoral neck  L spine 1.059 (was 1.076)  Left total hip 0.928 (was 0.930)  Right total hip  0.830 (was 0.841)--       EXAM: DX HIP/PELVIS/SPINE  LOCATION: [facility]  DATE/TIME: 12/6/2022 11:10 AM     INDICATION:  Low bone density, Postmenopausal status  COMPARISON: 10/21/2020  TECHNIQUE: Dual-energy x-ray absorptiometry performed with routine technique.     FINDINGS:     Lumbar Spine: L1-L4: BMD: 1.059 g/cm2. T-score: -1.1. Z-score: 0.9  RIGHT Hip Total: BMD: 0.830 g/cm2. T-score: -1.4. Z-score: 0.4  RIGHT Hip Femoral neck: BMD: 0.847 g/cm2. T-score: -1.4. Z-score: 0.6  LEFT Hip Total: BMD: 0.928 g/cm2. T-score: -0.6. Z-score: 1.2  LEFT Hip Femoral neck: BMD: 0.832 g/cm2. T-score: -1.5. Z-score: 0.5     WHO Criteria:  Normal: T score at or above -1 SD  Osteopenia: T score between -1 and -2.5 SD  Osteoporosis: T score at or below -2.5 SD     COMPARISON: There has been a 1.6 % decrease in lumbar spine BMD. There has been a 0.8 % decrease in bilateral hip BMD.     FRAX Results: 10 year probability of major osteoporotic fracture is 9.5%, and of hip fracture is 1.6%, based on left femoral neck BMD.     RECOMMENDATIONS:   Consider treatment if major osteoporotic fracture score is greater than or equal to 20%. Consider treatment if hip fracture score is greater than or equal to 3%.                                                                      IMPRESSION: Low bone density (OSTEOPENIA). T score meets the World Health Organization (WHO) criteria for low bone density (osteopenia) at one or more measured sites. The risk of osteoporotic fracture increased approximately two-fold for each SD decrease   in T-score.

## 2022-12-30 ENCOUNTER — HOSPITAL ENCOUNTER (OUTPATIENT)
Dept: PHYSICAL THERAPY | Facility: CLINIC | Age: 72
Setting detail: THERAPIES SERIES
Discharge: HOME | End: 2022-12-30
Attending: FAMILY MEDICINE
Payer: COMMERCIAL

## 2022-12-30 PROCEDURE — 97110 THERAPEUTIC EXERCISES: CPT | Mod: GP | Performed by: PHYSICAL THERAPIST

## 2022-12-30 NOTE — PROGRESS NOTES
[facility]    OUTPATIENT PHYSICAL THERAPY  PLAN OF TREATMENT FOR OUTPATIENT REHABILITATION AND PROGRESS NOTE           Patient's Last Name, First Name, Arabella Esteban Date of Birth  1950   Provider's Name  [facility] Medical Record No.  [id number]    Onset Date   06/28/22 Start of Care Date  10/27/22   Type:     _X_PT   ___OT   ___SLP Medical Diagnosis  Gluteal pain (M79.18)     Right hip pain (M25.551)     Primary osteoarthritis of right hip (M16.11)   PT Diagnosis  R hip pain Plan of Treatment  Frequency/Duration: 1-2 more visits   Certification date from 12/30/22 to 2/24/23     Goals:  Goal Identifier 1   Goal Description Patient will report pain 2/10 or less in the evening with sitting.   Target Date 02/24/23   Date Met      Progress (detail required for progress note): improving, more with standing than sitting, 4/10     Goal Identifier 2   Goal Description Patient will be able to resume regular strength training and walking programs for fitness without increased pain.   Target Date 02/24/23   Date Met      Progress (detail required for progress note): improving, has been doing 6,000-7,000 steps, normally does about 10,000 steps a day.     Goal Identifier 3   Goal Description Patient will be independent and consistent with HEP 5x a week to aid function recovery.   Target Date 01/05/23   Date Met  12/30/22   Progress (detail required for progress note): Doing exercises 2x a day, every day         Beginning/End Dates of Progress Note Reporting Period:  10/27/22 to 12/30/22    Progress Toward Goals:   Progress this reporting period: Overall, Dayan has made good improvement with physical therapy. She is reporting less pain and is able to increase her walking distance. She is still not quite where she was prior to her injury but has been gradually  increasing her steps without issue. We are planning one more visit in a few weeks to assess how her new exercises are going and then Dayan will likely be ready for discharge.    Client Self (Subjective) Report for Progress Note Reporting Period: Patient reports that night after last PT visit, she was sleeping and sharp pain woke her up in the middle of night. Was very painful for about a week but with doing the exercises it has been getting better. Overall things have been improving and the exercises really seem to help.        I CERTIFY THE NEED FOR THESE SERVICES FURNISHED UNDER        THIS PLAN OF TREATMENT AND WHILE UNDER MY CARE     (Physician co-signature of this document indicates review and certification of the therapy plan).                Referring Provider: Valdo Olsen DO Nicole A. Wheeler, PT

## 2023-04-11 DIAGNOSIS — R09.81 NASAL CONGESTION: ICD-10-CM

## 2023-04-12 RX ORDER — FLUTICASONE PROPIONATE 50 MCG
SPRAY, SUSPENSION (ML) NASAL
Qty: 48 G | Refills: 1 | Status: SHIPPED | OUTPATIENT
Start: 2023-04-12 | End: 2024-09-12

## 2023-04-12 NOTE — TELEPHONE ENCOUNTER
"Last Written Prescription Date:  10/13/22  Last Fill Quantity: 48 ml,  # refills: 1   Last office visit provider:  8/31/22     Requested Prescriptions   Pending Prescriptions Disp Refills     fluticasone (FLONASE) 50 MCG/ACT nasal spray [Pharmacy Med Name: FLUTICASONE PROP 50 MCG SPRAY] 48 mL 1     Sig: INHALE 1-2 SPRAYS INTO EACH NOSTRIL ONCE DAILY       Nasal Allergy Protocol Passed - 4/11/2023  7:56 AM        Passed - Patient is age 12 or older        Passed - Recent (12 mo) or future (30 days) visit within the authorizing provider's specialty     Patient has had an office visit with the authorizing provider or a provider within the authorizing providers department within the previous 12 mos or has a future within next 30 days. See \"Patient Info\" tab in inbasket, or \"Choose Columns\" in Meds & Orders section of the refill encounter.              Passed - Medication is active on med list             Jeromy Mart RN 04/12/23 8:52 AM  "

## 2023-05-04 DIAGNOSIS — I10 PRIMARY HYPERTENSION: ICD-10-CM

## 2023-05-04 RX ORDER — AMLODIPINE BESYLATE 2.5 MG/1
TABLET ORAL
Qty: 90 TABLET | Refills: 1 | OUTPATIENT
Start: 2023-05-04

## 2023-05-18 ENCOUNTER — ANCILLARY PROCEDURE (OUTPATIENT)
Dept: GENERAL RADIOLOGY | Facility: CLINIC | Age: 73
End: 2023-05-18
Attending: INTERNAL MEDICINE
Payer: COMMERCIAL

## 2023-05-18 ENCOUNTER — OFFICE VISIT (OUTPATIENT)
Dept: RHEUMATOLOGY | Facility: CLINIC | Age: 73
End: 2023-05-18
Attending: NURSE PRACTITIONER
Payer: COMMERCIAL

## 2023-05-18 VITALS
SYSTOLIC BLOOD PRESSURE: 162 MMHG | OXYGEN SATURATION: 100 % | DIASTOLIC BLOOD PRESSURE: 78 MMHG | WEIGHT: 124 LBS | BODY MASS INDEX: 22.68 KG/M2 | HEART RATE: 81 BPM

## 2023-05-18 DIAGNOSIS — G89.29 CHRONIC BILATERAL LOW BACK PAIN WITH RIGHT-SIDED SCIATICA: ICD-10-CM

## 2023-05-18 DIAGNOSIS — M16.0 PRIMARY OSTEOARTHRITIS OF BOTH HIPS: ICD-10-CM

## 2023-05-18 DIAGNOSIS — M54.41 CHRONIC BILATERAL LOW BACK PAIN WITH RIGHT-SIDED SCIATICA: ICD-10-CM

## 2023-05-18 DIAGNOSIS — M16.0 PRIMARY OSTEOARTHRITIS OF BOTH HIPS: Primary | ICD-10-CM

## 2023-05-18 PROCEDURE — 73522 X-RAY EXAM HIPS BI 3-4 VIEWS: CPT | Mod: TC | Performed by: RADIOLOGY

## 2023-05-18 PROCEDURE — 72100 X-RAY EXAM L-S SPINE 2/3 VWS: CPT | Mod: TC | Performed by: RADIOLOGY

## 2023-05-18 PROCEDURE — 99203 OFFICE O/P NEW LOW 30 MIN: CPT | Performed by: INTERNAL MEDICINE

## 2023-05-18 NOTE — PROGRESS NOTES
Rheumatology Clinic Visit      Arabella Rodríguez MRN# [id number]   YOB: 1950 Age: 72 year old      Date of visit: 5/18/23   PCP: Gloria Escobedo    Chief Complaint   Patient presents with:  Consult: Consult for RA    Assessment and Plan     1.  Chronic low back pain and bilateral hip pain: Consistent with degenerative arthritis in the lumbar spine and both hips.  Check x-rays.  Start physical therapy.  This is not consistent with an inflammatory arthritis.  Noted that the rheumatoid factor is low positive; CCP is negative.  Chronic illness, progressive.    - X-rays: Lumbar spine, bilateral hips  - Physical therapy referral    Addendum: X-rays show moderate to advanced degenerative changes in the right hip and mild degenerative changes in the left hip.  Mild and moderate degenerative changes of the lumbar spine.    2. Elevated blood pressure:  Dayan to follow up with Primary Care provider regarding elevated blood pressure.     Total minutes spent in evaluation with patient, documentation, , and review of pertinent studies and chart notes: 34     Ms. Rodríguez verbalized agreement with and understanding of the rational for the diagnosis and treatment plan.  All questions were answered to best of my ability and the patient's satisfaction. Ms. Rodríguez was advised to contact the clinic with any questions that may arise after the clinic visit.      Thank you for involving me in the care of the patient    Return to clinic: No scheduled return appointment in rheumatology needed at this time. Return PRN.       HPI   Arabella Rodríguez is a 72 year old female with a past medical history significant for HSV 1, hypertension, and hyperlipidemia who presents for evaluation of hip arthritis.    Today, 5/25/2023: Today the patient reports that she has bilateral hip pain that radiates towards the groin, and low back pain that radiates to the right buttock.  Hip and low back pain is worse with  activity and improves with rest.  No other joint pain.  She was found to have a positive rheumatoid factor, prompting referral to rheumatology.  No morning stiffness.  No joint swelling.    Denies fevers, chills, nausea, vomiting, constipation, diarrhea.  No chest pain/pressure, palpitations, or shortness of breath. No LE swelling. No neck pain. No oral or nasal sores.  No rash. No sicca symptoms. No photosensitivity or photophobia. No eye pain or redness. No history of inflammatory eye disease.  No history of inflammatory bowel disease.  No history of DVT, pulmonary embolism, or miscarriage.       Tobacco: Former smoker  EtOH: Occasional  Drugs: None    ROS   12 point review of system was completed and negative except as noted in the HPI     Active Problem List     Patient Active Problem List   Diagnosis     Xeroderma     Herpes simplex type 1 infection     Low bone density     Thyroid cyst     Hypertension     Hyperlipidemia LDL goal <130     Multiple thyroid nodules     Skin lesion     CARDIOVASCULAR SCREENING; LDL GOAL LESS THAN 130     Past Medical History     Past Medical History:   Diagnosis Date     Arthritis Repa [place] 2016     Hypertension      Osteopenia      Thyroid nodules [facility]     benign      Past Surgical History     Past Surgical History:   Procedure Laterality Date     ABDOMEN SURGERY  1986         BIOPSY  2014    Thyroid     C ANESTH, SECTION       COLONOSCOPY       COLONOSCOPY N/A 2018    Procedure: COLONOSCOPY;  colonoscopy;  Surgeon: Xu Feliciano MD;  Location: [facility]     EXCISE MASS WRIST Left 2016    Procedure: EXCISE MASS WRIST;  Surgeon: Germain Hull MD;  Location: [facility]     ORTHOPEDIC SURGERY  2016    I had a benign tumor removed from my wrist.     TONSILLECTOMY       Allergy     Allergies   Allergen Reactions     Formaldehyde Hives     Pravachol [Pravastatin] Muscle Pain (Myalgia)     Current Medication List      Current Outpatient Medications   Medication Sig     amLODIPine (NORVASC) 2.5 MG tablet Take 1 tablet (2.5 mg) by mouth daily     CALCIUM + D OR 1 TABLET ORALLY DAILY     fluticasone (FLONASE) 50 MCG/ACT nasal spray INHALE 1-2 SPRAYS INTO EACH NOSTRIL ONCE DAILY     losartan-hydrochlorothiazide (HYZAAR) 100-25 MG tablet Take 1 tablet by mouth daily     MULTI-VITAMIN OR 1 TABLET ORALLY DAILY     triamcinolone (KENALOG) 0.1 % external cream Apply topically 2 times daily     valACYclovir (VALTREX) 1000 mg tablet IF YOU HAVE A COLD SORE USE 2 TABLETS BY MOUTH TWO TIMES A DAY FOR 2 DAYS.  IF YOU HAVE THE RASH ON YOUR LEG TAKE 1/2 TABLET TWO TIMES A DAY FOR 3 DAYS     No current facility-administered medications for this visit.         Social History   See HPI    Family History     Family History   Problem Relation Age of Onset     Lipids Mother      Arthritis Mother      Hypertension Mother      Hyperlipidemia Mother      Osteopenia Mother      Hypertension Father      Hyperlipidemia Father      Prostate Cancer Father      Hypertension Paternal Grandfather      Neurologic Disorder Brother         brain tumor     Other Cancer Brother         1997 Brain Surgery/1997 Brain Surgery     Depression Sister         early 1980s     Depression Niece      Other Cancer Brother         1997 Brain Surgery; 2021 Recurrence     Depression Niece         2007     Depression Sister         early 1980s     Diabetes No family hx of      Thyroid Cancer No family hx of      Thyroid Disease No family hx of          Physical Exam     Temp Readings from Last 3 Encounters:   08/31/22 97.2  F (36.2  C) (Tympanic)   07/01/22 97.8  F (36.6  C) (Tympanic)   08/27/21 97.4  F (36.3  C) (Tympanic)     BP Readings from Last 5 Encounters:   05/18/23 (!) 162/78   10/06/22 (!) 167/80   08/31/22 (!) 142/70   07/01/22 (!) 160/78   12/22/21 120/68     Pulse Readings from Last 1 Encounters:   05/18/23 81     Resp Readings from Last 1 Encounters:   08/31/22 16  "    Estimated body mass index is 22.68 kg/m  as calculated from the following:    Height as of 10/6/22: 1.575 m (5' 2\").    Weight as of this encounter: 56.2 kg (124 lb).    GEN: NAD. Healthy appearing adult.   HEENT:  Anicteric, noninjected sclera. No obvious external lesions of the ear and nose. Hearing intact.  CV: S1, S2. RRR. No m/r/g  PULM: No increased work of breathing. CTA bilaterally   MSK: MCPs, PIPs, DIPs without swelling or tenderness to palpation.  Wrists without swelling or tenderness to palpation.  Elbows and shoulders without swelling or tenderness to palpation.  Lumbar spine nontender to palpation.  Hips nontender to palpation over the trochanteric bursae.  Negative straight leg test bilaterally.  Internal and external rotation of the hip resulted in mild hip pain radiating towards the groin, bilaterally.  Knees, ankles, and MTPs without swelling or tenderness to palpation.    SKIN: No rash or jaundice seen  PSYCH: Alert. Appropriate.      Labs / Imaging (select studies)     RF/CCP  Recent Labs   Lab Test 10/06/22  1409   CCPIGG 0.5   RHF 16*     CBC  Recent Labs   Lab Test 08/16/22  0824 12/20/17  1001   WBC 6.6 7.5   RBC 4.12 4.34   HGB 12.5 13.3   HCT 37.3 38.6   MCV 91 89   RDW 12.1 11.7    306   MCH 30.3 30.6   MCHC 33.5 34.5   NEUTROPHIL 51 60.4   LYMPH 37 29.0   MONOCYTE 10 8.9   EOSINOPHIL 2 1.2   BASOPHIL 0 0.5   ANEU  --  4.5   ALYM  --  2.2   DENIZ  --  0.7   AEOS  --  0.1   ABAS  --  0.0   ANEUTAUTO 3.3  --    ALYMPAUTO 2.4  --    AMONOAUTO 0.7  --    AEOSAUTO 0.1  --    ABSBASO 0.0  --      CMP  Recent Labs   Lab Test 08/16/22  0824 08/09/21  0845 04/29/21  1023 08/12/20  0918 03/28/19  1014 03/27/18  0931 12/20/17  1001    132* 132* 131* 133 133  --    POTASSIUM 4.0 3.7 4.0 3.8 3.7 4.0  --    CHLORIDE 101 97 98 98 97 99  --    CO2 27 27 29 30 29 30  --    ANIONGAP 6 8 5 3 7 4  --    GLC 97 92 100* 101* 97 97  --    BUN 12 15 12 15 14 13  --    CR 0.73 0.84 0.84 0.77 0.80 " 0.71  --    GFRESTIMATED 87 71 71 78 75 82  --    GFRESTBLACK  --   --  82 >90 87 >90  --    NICK 9.6 9.6 9.1 9.2 9.3 9.8  --    BILITOTAL  --   --   --  0.6  --   --  0.6   ALBUMIN  --   --   --  4.1  --   --  4.6   PROTTOTAL  --   --   --  7.2  --   --  8.0   ALKPHOS  --   --   --  51  --   --  53   AST  --   --   --  24  --   --  26   ALT  --   --   --  28  --   --  25     HgA1c  Recent Labs   Lab Test 04/04/16  0910   A1C 5.6     Calcium/VitaminD  Recent Labs   Lab Test 08/16/22  0824 08/09/21  0845 04/29/21  1023   NICK 9.6 9.6 9.1   VITDT  --  65  --      ESR/CRP  Recent Labs   Lab Test 10/06/22  1409   SED 9   CRPI <3.00     TSH/T4  Recent Labs   Lab Test 08/16/22  0824 08/09/21  0845 08/12/20  0918 03/27/18  0931 04/04/16  0910 03/31/15  1015   TSH 3.05 2.76 3.52 1.62 2.80 2.30   T4  --   --   --  0.95 0.98 0.97     Hepatitis C  Recent Labs   Lab Test 03/27/18  0931   HCVAB Nonreactive       Immunization History     Immunization History   Administered Date(s) Administered     COVID-19 Bivalent 18+ (Moderna) 12/12/2022     COVID-19 Monovalent 18+ (Moderna) 01/20/2021, 02/17/2021, 10/27/2021, 04/14/2022     FLU 6-35 months 10/18/2012     Flu 65+ Years 10/03/2019, 10/27/2022     Flu, Unspecified 10/03/2019     Influenza (High Dose) 3 valent vaccine 10/28/2016, 10/05/2018, 10/03/2019     Influenza (IIV3) PF 10/18/2012, 12/11/2014, 11/01/2017     Influenza Vaccine 65+ (Fluzone HD) 10/14/2020, 10/27/2021     Pneumo Conj 13-V (2010&after) 03/27/2017     Pneumococcal 23 valent 03/27/2018     TD,PF 7+ (Tenivac) 01/01/2007     TDAP Vaccine (Adacel) 03/31/2015     Zoster recombinant adjuvanted (SHINGRIX) 10/03/2019, 12/10/2019     Zoster vaccine, live 05/01/2012          Chart documentation done in part with Dragon Voice recognition Software. Although reviewed after completion, some word and grammatical error may remain.    Jasmeet Yang MD

## 2023-06-06 ENCOUNTER — THERAPY VISIT (OUTPATIENT)
Dept: PHYSICAL THERAPY | Facility: CLINIC | Age: 73
End: 2023-06-06
Attending: INTERNAL MEDICINE
Payer: COMMERCIAL

## 2023-06-06 DIAGNOSIS — M54.41 CHRONIC BILATERAL LOW BACK PAIN WITH RIGHT-SIDED SCIATICA: ICD-10-CM

## 2023-06-06 DIAGNOSIS — G89.29 CHRONIC BILATERAL LOW BACK PAIN WITH RIGHT-SIDED SCIATICA: ICD-10-CM

## 2023-06-06 DIAGNOSIS — M16.0 PRIMARY OSTEOARTHRITIS OF BOTH HIPS: ICD-10-CM

## 2023-06-06 PROCEDURE — 97110 THERAPEUTIC EXERCISES: CPT | Mod: GP | Performed by: PHYSICAL THERAPIST

## 2023-06-06 PROCEDURE — 97161 PT EVAL LOW COMPLEX 20 MIN: CPT | Mod: GP | Performed by: PHYSICAL THERAPIST

## 2023-06-06 NOTE — PROGRESS NOTES
PHYSICAL THERAPY EVALUATION  Type of Visit: Evaluation    See electronic medical record for Abuse and Falls Screening details.    Subjective      Presenting condition or subjective complaint: Right hip degeneration. Having trouble standing for long periods later in the day. Having trouble sleeping at night. Will take advil and then wake with pain 3-4 hours later. When she wakes in the morning it is a little painful and then gets better as she is moving around. Primary doctor referred her to rheumatology for concern of RA but she reports she does not have that. She reports the rheumatologist did an xray of the hip and she has had a little more degeneration in the right hip and recommended physical therapy.   Date of onset: 24, started 2019 and having worsening pain    Relevant medical history: High blood pressure; Osteoarthritis; Pain at night or rest; Thyroid problems   Dates & types of surgery: tonsil , wrist ,      Prior diagnostic imaging/testing results: X-ray   XR PELVIS AND HIP BILATERAL 2 VIEWS 2023 12:29 PM      HISTORY: Primary osteoarthritis of both hips     COMPARISON: 3/28/2019     LUMBAR SPINE TWO TO THREE VIEWS  2023 12:21 PM      HISTORY: Chronic bilateral low back pain with right-sided sciatica.     COMPARISON: 3/28/2019.                                                                      IMPRESSION: Nomenclature is based on five lumbar vertebral bodies.  Minimal dextroconvex curvature of the lower thoracic  spine/thoracolumbar junction. Trace retrolisthesis of L2 on L3 and L3  on L4. Mild grade 1 anterolisthesis of L5 on S1. No gross vertebral  body height loss. Mild and moderate degrees of multilevel degenerative  disc space narrowing with marginal endplate osteophytes. Mild/moderate  lower lumbar degenerative facet arthropathy. Partially visualized  degenerative changes of the hips. Presumed calcified phleboliths  projecting over the pelvis.     Compared to  prior, mild grade 1 anterolisthesis at L5-S1 appears new.                                                                   IMPRESSION: Moderate to advanced degenerative changes in the right  hip. Mild degenerative changes in the left hip. Degenerative changes  lower lumbar spine. Findings are stable  Prior therapy history for the same diagnosis, illness or injury: Yes PT for hip Fall 2022    Prior Level of Function   Transfers: Independent  Ambulation: Independent  ADL: Independent  IADL: independent    Living Environment  Social support: With a significant other or spouse   Type of home: House   Stairs to enter the home: Yes 4 Is there a railing: Yes   Ramp: No   Stairs inside the home: Yes 10 Is there a railing: Yes   Help at home: None  Equipment owned:       Employment: Yes Teacher  Hobbies/Interests:      Patient goals for therapy: sleep    Pain assessment: R glute region and hamstring to knee, pain stops at knee but occasionally has pain in the ankle. Very rarely reports numbness or tingling.      Objective   LUMBAR SPINE EVALUATION  PAIN: Pain Level with Use: 7/10, in the evening and at night   INTEGUMENTARY (edema, incisions):   POSTURE: WNL  GAIT:   Weightbearing Status:   Assistive Device(s): None  Gait Deviations: WNL  Balance/Proprioception:   Weight Bearing Alignment:   Non-Weight Bearing Alignment:    ROM:   (Degrees) Left AROM Left PROM  Right AROM Right PROM   Hip Flexion  wnl  Mod toledo   Hip Extension  wnl  Mild toledo   Hip Abduction       Hip Adduction       Hip Internal Rotation  wnl  Mod toledo   Hip External Rotation  wnl  Mild toledo   Knee Flexion       Knee Extension       Lumbar Side glide Fingertips to superior pole of patella  Fingertips to superior pole of patella, pain R glute region   Lumbar Flexion Fingertips to 4 inch above toes   Lumbar Extension 50%    Pain:   End feel:     PELVIC/SI SCREEN: supine to sit +, Thigh thrust -, SI gapping - , Sacral thrust -  Strength: Hip flexion 5/5 B, Hip  abduction R 4/5 pain, L 5/5,  Knee extension 5/5 B, Knee flexion 5/5 B, Ankle DF 5/5 B, Great toe extension 5/5 B, Plantarflexion 5/5 B     DTR S:    Left Right   C5 (Biceps)     C6 (Brachioradialis)     C7 (Triceps)     L4 (Quad) 2 2   S1 (Achilles) 0 0     NEURAL TENSION: mild twinge in R glute with testing R side  FLEXIBILITY: Decreased hip flexors L, Decreased hamstrings L, Decreased hip IR R, Decreased hip ER R, Decreased piriformis R, Decreased hip flexors R, Decreased hamstrings R  LUMBAR/HIP Special Tests: - scour B, FADIR R + L -   PALPATION: unremarkable with palpation to lumbar spine and B hip and gluteal areas  SPINAL SEGMENTAL CONCLUSIONS: hypomobile lumbar spine      Assessment & Plan   CLINICAL IMPRESSIONS   Medical Diagnosis: Primary osteoarthritis of both hips (M16.0)     Chronic bilateral low back pain with right-sided sciatica (M54.41, G89.29)    Treatment Diagnosis: R hip pain   Impression/Assessment: Patient is a 72 year old female with R hip complaints.  The following significant findings have been identified: Pain, Decreased ROM/flexibility, Decreased joint mobility and Decreased strength. These impairments interfere with their ability to perform self care tasks, work tasks, recreational activities, household chores, household mobility and community mobility as compared to previous level of function.     Clinical Decision Making (Complexity):   Clinical Presentation: Stable/Uncomplicated  Clinical Presentation Rationale: based on medical and personal factors listed in PT evaluation  Clinical Decision Making (Complexity): Low complexity    PLAN OF CARE  Treatment Interventions:  Interventions: Gait Training, Manual Therapy, Neuromuscular Re-education, Therapeutic Activity, Therapeutic Exercise, Self-Care/Home Management    Long Term Goals     PT Goal 1  Goal Identifier: 1  Goal Description: Patient will report no waking at night due to pain.  Target Date: 08/29/23  PT Goal 2  Goal Identifier:  2  Goal Description: Patient will report no issues falling asleep due to pain.  Target Date: 08/29/23  PT Goal 3  Goal Identifier: 3  Goal Description: Patient will be able to stand 30 minutes with pain 3/10 or less in the evening.  Target Date: 08/29/23  PT Goal 4  Goal Identifier: 4  Goal Description: Patient will be independent and consistent with HEP at least 5x a week to aid functional recovery.  Target Date: 08/29/23      Frequency of Treatment: 1x a week  Duration of Treatment: 12 weeks    Recommended Referrals to Other Professionals:   Education Assessment:   Learner/Method: Patient;Listening;Demonstration;Pictures/Video    Risks and benefits of evaluation/treatment have been explained.   Patient/Family/caregiver agrees with Plan of Care.     Evaluation Time:     PT Eval, Low Complexity Minutes (06351): 30      Signing Clinician: Niurka Delgado, PT      [facility]                                                                                   OUTPATIENT PHYSICAL THERAPY      PLAN OF TREATMENT FOR OUTPATIENT REHABILITATION   Patient's Last Name, First Name, Arabella Esteban YOB: 1950   Provider's Name   [facility]   Medical Record No.  [id number]     Onset Date: 05/18/23  Start of Care Date: 06/06/23     Medical Diagnosis:  Primary osteoarthritis of both hips (M16.0)     Chronic bilateral low back pain with right-sided sciatica (M54.41, G89.29)      PT Treatment Diagnosis:  R hip pain Plan of Treatment  Frequency/Duration: 1x a week/ 12 weeks    Certification date from 06/06/23 to 08/29/23         See note for plan of treatment details and functional goals     Niurka Delgado, PT                         I CERTIFY THE NEED FOR THESE SERVICES FURNISHED UNDER        THIS PLAN OF TREATMENT AND WHILE UNDER MY CARE     (Physician attestation of this document indicates review and certification of the therapy plan).                   Referring Provider:  Jasmeet Yang      Initial Assessment  See Epic Evaluation- Start of Care Date: 06/06/23

## 2023-06-13 ENCOUNTER — THERAPY VISIT (OUTPATIENT)
Dept: PHYSICAL THERAPY | Facility: CLINIC | Age: 73
End: 2023-06-13
Attending: INTERNAL MEDICINE
Payer: COMMERCIAL

## 2023-06-13 DIAGNOSIS — G89.29 CHRONIC BILATERAL LOW BACK PAIN WITH RIGHT-SIDED SCIATICA: Primary | ICD-10-CM

## 2023-06-13 DIAGNOSIS — M16.0 PRIMARY OSTEOARTHRITIS OF BOTH HIPS: ICD-10-CM

## 2023-06-13 DIAGNOSIS — M54.41 CHRONIC BILATERAL LOW BACK PAIN WITH RIGHT-SIDED SCIATICA: Primary | ICD-10-CM

## 2023-06-13 PROCEDURE — 97110 THERAPEUTIC EXERCISES: CPT | Mod: GP | Performed by: PHYSICAL THERAPIST

## 2023-06-13 PROCEDURE — 97140 MANUAL THERAPY 1/> REGIONS: CPT | Mod: GP | Performed by: PHYSICAL THERAPIST

## 2023-06-15 ENCOUNTER — MYC MEDICAL ADVICE (OUTPATIENT)
Dept: FAMILY MEDICINE | Facility: CLINIC | Age: 73
End: 2023-06-15
Payer: COMMERCIAL

## 2023-06-15 DIAGNOSIS — M25.50 MULTIPLE JOINT PAIN: Primary | ICD-10-CM

## 2023-06-16 RX ORDER — PREDNISONE 20 MG/1
20 TABLET ORAL 2 TIMES DAILY
Qty: 10 TABLET | Refills: 2 | Status: SHIPPED | OUTPATIENT
Start: 2023-06-16 | End: 2023-06-21

## 2023-06-22 ENCOUNTER — THERAPY VISIT (OUTPATIENT)
Dept: PHYSICAL THERAPY | Facility: CLINIC | Age: 73
End: 2023-06-22
Attending: INTERNAL MEDICINE
Payer: COMMERCIAL

## 2023-06-22 DIAGNOSIS — M16.0 PRIMARY OSTEOARTHRITIS OF BOTH HIPS: ICD-10-CM

## 2023-06-22 DIAGNOSIS — G89.29 CHRONIC BILATERAL LOW BACK PAIN WITH RIGHT-SIDED SCIATICA: Primary | ICD-10-CM

## 2023-06-22 DIAGNOSIS — M54.41 CHRONIC BILATERAL LOW BACK PAIN WITH RIGHT-SIDED SCIATICA: Primary | ICD-10-CM

## 2023-06-22 PROCEDURE — 97140 MANUAL THERAPY 1/> REGIONS: CPT | Mod: GP | Performed by: PHYSICAL THERAPIST

## 2023-06-22 PROCEDURE — 97110 THERAPEUTIC EXERCISES: CPT | Mod: GP | Performed by: PHYSICAL THERAPIST

## 2023-07-10 ENCOUNTER — MYC MEDICAL ADVICE (OUTPATIENT)
Dept: FAMILY MEDICINE | Facility: CLINIC | Age: 73
End: 2023-07-10
Payer: COMMERCIAL

## 2023-07-10 ENCOUNTER — ANCILLARY ORDERS (OUTPATIENT)
Dept: MAMMOGRAPHY | Facility: CLINIC | Age: 73
End: 2023-07-10

## 2023-07-10 DIAGNOSIS — B35.1 TOENAIL FUNGUS: ICD-10-CM

## 2023-07-10 DIAGNOSIS — I10 PRIMARY HYPERTENSION: ICD-10-CM

## 2023-07-10 DIAGNOSIS — E78.5 HYPERLIPIDEMIA LDL GOAL <130: Primary | ICD-10-CM

## 2023-07-10 DIAGNOSIS — Z12.31 VISIT FOR SCREENING MAMMOGRAM: Primary | ICD-10-CM

## 2023-07-10 DIAGNOSIS — E04.2 MULTIPLE THYROID NODULES: ICD-10-CM

## 2023-07-12 DIAGNOSIS — B35.1 TOENAIL FUNGUS: ICD-10-CM

## 2023-07-12 RX ORDER — CICLOPIROX 80 MG/ML
SOLUTION TOPICAL DAILY
Qty: 6 ML | Refills: 0 | Status: SHIPPED | OUTPATIENT
Start: 2023-07-12 | End: 2023-09-06

## 2023-07-13 ENCOUNTER — THERAPY VISIT (OUTPATIENT)
Dept: PHYSICAL THERAPY | Facility: CLINIC | Age: 73
End: 2023-07-13
Attending: INTERNAL MEDICINE
Payer: COMMERCIAL

## 2023-07-13 DIAGNOSIS — G89.29 CHRONIC BILATERAL LOW BACK PAIN WITH RIGHT-SIDED SCIATICA: Primary | ICD-10-CM

## 2023-07-13 DIAGNOSIS — M16.0 PRIMARY OSTEOARTHRITIS OF BOTH HIPS: ICD-10-CM

## 2023-07-13 DIAGNOSIS — M54.41 CHRONIC BILATERAL LOW BACK PAIN WITH RIGHT-SIDED SCIATICA: Primary | ICD-10-CM

## 2023-07-13 PROCEDURE — 97110 THERAPEUTIC EXERCISES: CPT | Mod: GP | Performed by: PHYSICAL THERAPIST

## 2023-07-13 NOTE — PROGRESS NOTES
DISCHARGE  Reason for Discharge: Patient has met all goals.    Equipment Issued: theraband    Discharge Plan: Patient to continue home program.    Referring Provider:  Jasmeet Yang     07/13/23 0500   Appointment Info   Signing clinician's name / credentials Niurka Delgado, PT, DPT, OCS   Total/Authorized Visits 365   Visits Used 4   Medical Diagnosis Primary osteoarthritis of both hips (M16.0)     Chronic bilateral low back pain with right-sided sciatica (M54.41, G89.29)   PT Tx Diagnosis R hip pain   Quick Adds Certification   Progress Note/Certification   Start of Care Date 06/06/23   Onset of illness/injury or Date of Surgery 05/18/23   Therapy Frequency 1x a week   Predicted Duration 12 weeks   Certification date from 06/06/23   Certification date to 08/29/23   Progress Note Due Date 08/29/23   GOALS   PT Goals 2;3;4   PT Goal 1   Goal Identifier 1   Goal Description Patient will report no waking at night due to pain.   Goal Progress still wakes 3-4 AM, sometimes can stretch and it will go away. sometimes needs to take a tyelnol or advil at night but not doing this very often.   Target Date 08/29/23   PT Goal 2   Goal Identifier 2   Goal Description Patient will report no issues falling asleep due to pain.   Goal Progress pain is no longer keeping her awake but having trouble   Target Date 08/29/23   Date Met 07/13/23   PT Goal 3   Goal Identifier 3   Goal Description Patient will be able to stand 30 minutes with pain 3/10 or less in the evening.   Goal Progress no longer have the pain, when it starts to feel tight she can do a couple of the exercises and it will go away.   Target Date 08/29/23   Date Met 07/13/23   PT Goal 4   Goal Identifier 4   Goal Description Patient will be independent and consistent with HEP at least 5x a week to aid functional recovery.   Goal Progress independent, consistent daily   Target Date 08/29/23   Date Met 07/13/23   Subjective Report   Subjective Report Patient reports  that the hip is doing well. If she stretches before she gets out of bed it doesn't hurt then when she gets up. States if she feels it start to tighten and she does the exercises it feels better. Pain is no longer going all the way down the leg.   Treatment Interventions (PT)   Interventions Therapeutic Procedure/Exercise;Manual Therapy   Therapeutic Procedure/Exercise   Therapeutic Procedures: strength, endurance, ROM, flexibillity minutes (25353) 15   Ther Proc 1 - Details review of current HEP as well as current program for the [facility] and discussed modifications as well as answered pt's questions regarding specific exercises during the classes she likes to take   Skilled Intervention stretching, strengthening   Patient Response/Progress no questions   Education   Learner/Method Patient;Listening;Demonstration;Pictures/Video   Plan   Home program PTRX - printed hand out and on phone   Updates to plan of care discharge   Plan for next session pt to cont HEP on own   Total Session Time   Timed Code Treatment Minutes 15   Total Treatment Time (sum of timed and untimed services) 15         Please contact me with any questions or concerns.  Thank you for your referral.    Niurka Delgado, PT, DPT, OCS  Physical Therapist, Orthopedic Certified Specialist    [facility]  [address]   [address] [place] [id number]  [email or web address]   Office: [phone]   Employed by [facility]

## 2023-08-10 ENCOUNTER — HOSPITAL ENCOUNTER (OUTPATIENT)
Dept: ULTRASOUND IMAGING | Facility: CLINIC | Age: 73
Discharge: HOME | End: 2023-08-10
Attending: NURSE PRACTITIONER | Admitting: NURSE PRACTITIONER
Payer: COMMERCIAL

## 2023-08-10 ENCOUNTER — LAB (OUTPATIENT)
Dept: LAB | Facility: CLINIC | Age: 73
End: 2023-08-10
Payer: COMMERCIAL

## 2023-08-10 DIAGNOSIS — E04.2 MULTIPLE THYROID NODULES: ICD-10-CM

## 2023-08-10 DIAGNOSIS — I10 PRIMARY HYPERTENSION: ICD-10-CM

## 2023-08-10 DIAGNOSIS — E78.5 HYPERLIPIDEMIA LDL GOAL <130: ICD-10-CM

## 2023-08-10 LAB
ALBUMIN SERPL BCG-MCNC: 4.9 G/DL (ref 3.5–5.2)
ALP SERPL-CCNC: 57 U/L (ref 35–104)
ALT SERPL W P-5'-P-CCNC: 22 U/L (ref 0–50)
ANION GAP SERPL CALCULATED.3IONS-SCNC: 12 MMOL/L (ref 7–15)
AST SERPL W P-5'-P-CCNC: 26 U/L (ref 0–45)
BILIRUB SERPL-MCNC: 0.4 MG/DL
BUN SERPL-MCNC: 12.6 MG/DL (ref 8–23)
CALCIUM SERPL-MCNC: 10 MG/DL (ref 8.8–10.2)
CHLORIDE SERPL-SCNC: 96 MMOL/L (ref 98–107)
CHOLEST SERPL-MCNC: 305 MG/DL
CREAT SERPL-MCNC: 0.81 MG/DL (ref 0.51–0.95)
DEPRECATED HCO3 PLAS-SCNC: 27 MMOL/L (ref 22–29)
ERYTHROCYTE [DISTWIDTH] IN BLOOD BY AUTOMATED COUNT: 11.8 % (ref 10–15)
GFR SERPL CREATININE-BSD FRML MDRD: 77 ML/MIN/1.73M2
GLUCOSE SERPL-MCNC: 103 MG/DL (ref 70–99)
HCT VFR BLD AUTO: 38 % (ref 35–47)
HDLC SERPL-MCNC: 57 MG/DL
HGB BLD-MCNC: 12.8 G/DL (ref 11.7–15.7)
LDLC SERPL CALC-MCNC: 211 MG/DL
MCH RBC QN AUTO: 29.7 PG (ref 26.5–33)
MCHC RBC AUTO-ENTMCNC: 33.7 G/DL (ref 31.5–36.5)
MCV RBC AUTO: 88 FL (ref 78–100)
NONHDLC SERPL-MCNC: 248 MG/DL
PLATELET # BLD AUTO: 320 10E3/UL (ref 150–450)
POTASSIUM SERPL-SCNC: 3.6 MMOL/L (ref 3.4–5.3)
PROT SERPL-MCNC: 7.2 G/DL (ref 6.4–8.3)
RBC # BLD AUTO: 4.31 10E6/UL (ref 3.8–5.2)
SODIUM SERPL-SCNC: 135 MMOL/L (ref 136–145)
TRIGL SERPL-MCNC: 187 MG/DL
TSH SERPL DL<=0.005 MIU/L-ACNC: 3.02 UIU/ML (ref 0.3–4.2)
WBC # BLD AUTO: 6.5 10E3/UL (ref 4–11)

## 2023-08-10 PROCEDURE — 80053 COMPREHEN METABOLIC PANEL: CPT

## 2023-08-10 PROCEDURE — 80061 LIPID PANEL: CPT

## 2023-08-10 PROCEDURE — 84443 ASSAY THYROID STIM HORMONE: CPT

## 2023-08-10 PROCEDURE — 76536 US EXAM OF HEAD AND NECK: CPT

## 2023-08-10 PROCEDURE — 36415 COLL VENOUS BLD VENIPUNCTURE: CPT

## 2023-08-10 PROCEDURE — 85027 COMPLETE CBC AUTOMATED: CPT

## 2023-08-14 DIAGNOSIS — I10 BENIGN ESSENTIAL HYPERTENSION: ICD-10-CM

## 2023-08-15 RX ORDER — LOSARTAN POTASSIUM AND HYDROCHLOROTHIAZIDE 25; 100 MG/1; MG/1
1 TABLET ORAL DAILY
Qty: 90 TABLET | Refills: 3 | Status: SHIPPED | OUTPATIENT
Start: 2023-08-15 | End: 2024-07-17

## 2023-08-15 NOTE — TELEPHONE ENCOUNTER
"Has appointment scheduled for 9/6/23      Requested Prescriptions   Pending Prescriptions Disp Refills    losartan-hydrochlorothiazide (HYZAAR) 100-25 MG tablet [Pharmacy Med Name: LOSARTAN POTASSIUM-HCTZ 100-25 TABS] 90 tablet 3     Sig: TAKE ONE TABLET BY MOUTH ONCE DAILY       Angiotensin-II Receptors Failed - 8/14/2023 10:39 AM        Failed - Last blood pressure under 140/90 in past 12 months     BP Readings from Last 3 Encounters:   05/18/23 (!) 162/78   10/06/22 (!) 167/80 08/31/22 (!) 142/70                 Passed - Recent (12 mo) or future (30 days) visit within the authorizing provider's specialty     Patient has had an office visit with the authorizing provider or a provider within the authorizing providers department within the previous 12 mos or has a future within next 30 days. See \"Patient Info\" tab in inbasket, or \"Choose Columns\" in Meds & Orders section of the refill encounter.              Passed - Medication is active on med list        Passed - Patient is age 18 or older        Passed - No active pregnancy on record        Passed - Normal serum creatinine on file in past 12 months     Recent Labs   Lab Test 08/10/23  0827   CR 0.81       Ok to refill medication if creatinine is low          Passed - Normal serum potassium on file in past 12 months     Recent Labs   Lab Test 08/10/23  0827   POTASSIUM 3.6                    Passed - No positive pregnancy test in past 12 months       Diuretics (Including Combos) Protocol Failed - 8/14/2023 10:39 AM        Failed - Blood pressure under 140/90 in past 12 months     BP Readings from Last 3 Encounters:   05/18/23 (!) 162/78   10/06/22 (!) 167/80 08/31/22 (!) 142/70                 Failed - Normal serum sodium on file in past 12 months     Recent Labs   Lab Test 08/10/23  0827   *              Passed - Recent (12 mo) or future (30 days) visit within the authorizing provider's specialty     Patient has had an office visit with the authorizing " "provider or a provider within the authorizing providers department within the previous 12 mos or has a future within next 30 days. See \"Patient Info\" tab in inbasket, or \"Choose Columns\" in Meds & Orders section of the refill encounter.              Passed - Medication is active on med list        Passed - Patient is age 18 or older        Passed - No active pregancy on record        Passed - Normal serum creatinine on file in past 12 months     Recent Labs   Lab Test 08/10/23  0827   CR 0.81              Passed - Normal serum potassium on file in past 12 months     Recent Labs   Lab Test 08/10/23  0827   POTASSIUM 3.6                    Passed - No positive pregnancy test in past 12 months             "

## 2023-09-06 ENCOUNTER — OFFICE VISIT (OUTPATIENT)
Dept: FAMILY MEDICINE | Facility: CLINIC | Age: 73
End: 2023-09-06
Payer: COMMERCIAL

## 2023-09-06 ENCOUNTER — HOSPITAL ENCOUNTER (OUTPATIENT)
Dept: MAMMOGRAPHY | Facility: CLINIC | Age: 73
Discharge: HOME | End: 2023-09-06
Attending: NURSE PRACTITIONER | Admitting: NURSE PRACTITIONER
Payer: COMMERCIAL

## 2023-09-06 VITALS
OXYGEN SATURATION: 98 % | DIASTOLIC BLOOD PRESSURE: 72 MMHG | WEIGHT: 120 LBS | BODY MASS INDEX: 22.08 KG/M2 | SYSTOLIC BLOOD PRESSURE: 136 MMHG | RESPIRATION RATE: 16 BRPM | HEIGHT: 62 IN | TEMPERATURE: 97.8 F | HEART RATE: 82 BPM

## 2023-09-06 DIAGNOSIS — I10 PRIMARY HYPERTENSION: ICD-10-CM

## 2023-09-06 DIAGNOSIS — M25.552 HIP PAIN, LEFT: ICD-10-CM

## 2023-09-06 DIAGNOSIS — Z00.00 ENCOUNTER FOR MEDICARE ANNUAL WELLNESS EXAM: Primary | ICD-10-CM

## 2023-09-06 DIAGNOSIS — B00.1 COLD SORE: ICD-10-CM

## 2023-09-06 DIAGNOSIS — B35.1 TOENAIL FUNGUS: ICD-10-CM

## 2023-09-06 DIAGNOSIS — Z12.31 VISIT FOR SCREENING MAMMOGRAM: ICD-10-CM

## 2023-09-06 DIAGNOSIS — E78.5 HYPERLIPIDEMIA LDL GOAL <100: ICD-10-CM

## 2023-09-06 PROCEDURE — 99214 OFFICE O/P EST MOD 30 MIN: CPT | Mod: 25 | Performed by: NURSE PRACTITIONER

## 2023-09-06 PROCEDURE — G0439 PPPS, SUBSEQ VISIT: HCPCS | Performed by: NURSE PRACTITIONER

## 2023-09-06 PROCEDURE — 77067 SCR MAMMO BI INCL CAD: CPT

## 2023-09-06 RX ORDER — CICLOPIROX 80 MG/ML
SOLUTION TOPICAL
Refills: 0 | OUTPATIENT
Start: 2023-09-06

## 2023-09-06 RX ORDER — VALACYCLOVIR HYDROCHLORIDE 1 G/1
TABLET, FILM COATED ORAL
Qty: 30 TABLET | Refills: 1 | Status: SHIPPED | OUTPATIENT
Start: 2023-09-06 | End: 2023-10-03

## 2023-09-06 RX ORDER — AMLODIPINE BESYLATE 2.5 MG/1
2.5 TABLET ORAL DAILY
Qty: 90 TABLET | Refills: 3 | Status: SHIPPED | OUTPATIENT
Start: 2023-09-06 | End: 2024-09-12

## 2023-09-06 RX ORDER — CICLOPIROX 80 MG/ML
SOLUTION TOPICAL DAILY
Qty: 6 ML | Refills: 0 | Status: SHIPPED | OUTPATIENT
Start: 2023-09-06 | End: 2024-01-19

## 2023-09-06 RX ORDER — CICLOPIROX 80 MG/ML
SOLUTION TOPICAL DAILY
Qty: 6 ML | Refills: 0 | Status: SHIPPED | OUTPATIENT
Start: 2023-09-06 | End: 2024-09-12

## 2023-09-06 ASSESSMENT — ENCOUNTER SYMPTOMS
DIZZINESS: 0
NERVOUS/ANXIOUS: 0
HEMATOCHEZIA: 0
PARESTHESIAS: 0
PALPITATIONS: 0
EYE PAIN: 0
WEAKNESS: 0
ABDOMINAL PAIN: 0
JOINT SWELLING: 0
FEVER: 0
CHILLS: 0
DIARRHEA: 0
FREQUENCY: 0
NAUSEA: 0
HEMATURIA: 0
DYSURIA: 0
SHORTNESS OF BREATH: 0
HEADACHES: 0
HEARTBURN: 0
ARTHRALGIAS: 1
CONSTIPATION: 0
COUGH: 0
SORE THROAT: 0
MYALGIAS: 1

## 2023-09-06 ASSESSMENT — PAIN SCALES - GENERAL: PAINLEVEL: NO PAIN (0)

## 2023-09-06 ASSESSMENT — ACTIVITIES OF DAILY LIVING (ADL): CURRENT_FUNCTION: NO ASSISTANCE NEEDED

## 2023-09-06 NOTE — PROGRESS NOTES
"SUBJECTIVE:   Dayan is a 73 year old who presents for Preventive Visit.      9/6/2023    10:33 AM   Additional Questions   Roomed by Agueda SOTO   Accompanied by self     Are you in the first 12 months of your Medicare coverage?  No    Healthy Habits:     In general, how would you rate your overall health?  Good    Frequency of exercise:  6-7 days/week    Duration of exercise:  15-30 minutes    Do you usually eat at least 4 servings of fruit and vegetables a day, include whole grains    & fiber and avoid regularly eating high fat or \"junk\" foods?  Yes    Taking medications regularly:  Yes    Medication side effects:  Muscle aches    Ability to successfully perform activities of daily living:  No assistance needed    Home Safety:  No safety concerns identified    Hearing Impairment:  No hearing concerns    In the past 6 months, have you been bothered by leaking of urine?  No    In general, how would you rate your overall mental or emotional health?  Excellent    Additional concerns today:  Yes    Have you ever done Advance Care Planning? (For example, a Health Directive, POLST, or a discussion with a medical provider or your loved ones about your wishes): Yes, advance care planning is on file.    Fall risk  Fallen 2 or more times in the past year?: No  Any fall with injury in the past year?: No    Cognitive Screening   1) Repeat 3 items (Leader, Season, Table)    2) Clock draw: NORMAL  3) 3 item recall: Recalls 3 objects  Results: 3 items recalled: COGNITIVE IMPAIRMENT LESS LIKELY    Mini-CogTM Copyright ELSI Blake. Licensed by the author for use in [facility]; reprinted with permission (patrick@.[email or web address]). All rights reserved.      Do you have sleep apnea, excessive snoring or daytime drowsiness? : no    Reviewed and updated as needed this visit by clinical staff   Tobacco  Allergies  Meds              Reviewed and updated as needed this visit by Provider                 Social History     Tobacco Use    " Smoking status: Former     Packs/day: 0.50     Years: 9.00     Pack years: 4.50     Types: Cigarettes     Start date: 10/1/1972     Quit date: 1981     Years since quittin.3    Smokeless tobacco: Never    Tobacco comments:     I quit in    Substance Use Topics    Alcohol use: Yes     Comment: 1 o 2 then switch  to na         2023    10:30 AM   Alcohol Use   Prescreen: >3 drinks/day or >7 drinks/week? No     Do you have a current opioid prescription? No  Do you use any other controlled substances or medications that are not prescribed by a provider? None    Current providers sharing in care for this patient include:   Patient Care Team:  Gloria Escobedo APRN CNP as PCP - General (Nurse Practitioner - Gerontology)  Sherice Estrada MD as MD (INTERNAL MEDICINE - ENDOCRINOLOGY, DIABETES & METABOLISM)  Gloria Escobedo APRN CNP as Assigned PCP  Elkin Hooks MD as Assigned Surgical Provider  Valdo Olsen DO as Assigned Musculoskeletal Provider  Jasmeet Yang MD as Assigned Rheumatology Provider    The following health maintenance items are reviewed in Epic and correct as of today:  Health Maintenance   Topic Date Due    URINE DRUG SCREEN  Never done    COVID-19 Vaccine (6 - Moderna series) 2023    ANNUAL REVIEW OF HM ORDERS  2023    INFLUENZA VACCINE (1) 2023    MEDICARE ANNUAL WELLNESS VISIT  2023    FALL RISK ASSESSMENT  2024    DTAP/TDAP/TD IMMUNIZATION (2 - Td or Tdap) 2025    MAMMO SCREENING  2025    COLORECTAL CANCER SCREENING  2028    LIPID  08/10/2028    ADVANCE CARE PLANNING  2028    DEXA  2037    HEPATITIS C SCREENING  Completed    PHQ-2 (once per calendar year)  Completed    Pneumococcal Vaccine: 65+ Years  Completed    ZOSTER IMMUNIZATION  Completed    IPV IMMUNIZATION  Aged Out    HPV IMMUNIZATION  Aged Out    MENINGITIS IMMUNIZATION  Aged Out     Review of Systems   Constitutional:   "Negative for chills and fever.   HENT:  Negative for congestion, ear pain, hearing loss and sore throat.    Eyes:  Negative for pain and visual disturbance.   Respiratory:  Negative for cough and shortness of breath.    Cardiovascular:  Negative for chest pain, palpitations and peripheral edema.   Gastrointestinal:  Negative for abdominal pain, constipation, diarrhea, heartburn, hematochezia and nausea.   Genitourinary:  Negative for dysuria, frequency, genital sores, hematuria and urgency.   Musculoskeletal:  Positive for arthralgias and myalgias. Negative for joint swelling.   Skin:  Negative for rash.   Neurological:  Negative for dizziness, weakness, headaches and paresthesias.   Psychiatric/Behavioral:  Negative for mood changes. The patient is not nervous/anxious.      OBJECTIVE:   /72   Pulse 82   Temp 97.8  F (36.6  C) (Tympanic)   Resp 16   Ht 1.575 m (5' 2\")   Wt 54.4 kg (120 lb)   SpO2 98%   BMI 21.95 kg/m   Estimated body mass index is 21.95 kg/m  as calculated from the following:    Height as of this encounter: 1.575 m (5' 2\").    Weight as of this encounter: 54.4 kg (120 lb).  Physical Exam  GENERAL: healthy, alert and no distress  EYES: Eyes grossly normal to inspection, PERRL and conjunctivae and sclerae normal  HENT: ear canals and TM's normal, nose and mouth without ulcers or lesions  NECK: no adenopathy, no asymmetry, masses, or scars and thyroid normal to palpation  RESP: lungs clear to auscultation - no rales, rhonchi or wheezes  CV: regular rate and rhythm, normal S1 S2, no S3 or S4, no murmur, click or rub, no peripheral edema and peripheral pulses strong  MS: no gross musculoskeletal defects noted, no edema  SKIN: no suspicious lesions or rashes  NEURO: Normal strength and tone, mentation intact and speech normal  PSYCH: mentation appears normal, affect normal/bright    Diagnostic Test Results:  Labs reviewed in Epic    ASSESSMENT / PLAN:   (Z00.00) Encounter for Medicare annual " wellness exam  (primary encounter diagnosis)  Comment: exam normal, discussed recent labs     (B00.1) Cold sore  Comment: using Valtrex as needed   Plan: valACYclovir (VALTREX) 1000 mg tablet         (E78.5) Hyperlipidemia LDL goal <100  Comment: would like to try diet first, patient agreed to restart Zetia 10 mg daily if LDL level still will be elevated in 3 months   Plan: Lipid panel reflex to direct LDL Fasting            (I10) Primary hypertension  Comment: well controlled, recommended to continue current treatment plan   Plan: amLODIPine (NORVASC) 2.5 MG tablet            (M25.552) Hip pain, left  Comment: will try Flexeril at bedtime as needed, will continue Tylenol 500-1500 mg daily as needed, advised to avoid daily use of NSAID's       (B35.1) Toenail fungus    Plan: Efinaconazole 10 % SOLN        If not covered patient will continue Penlac           COUNSELING:  Reviewed preventive health counseling, as reflected in patient instructions       Regular exercise       Healthy diet/nutrition        She reports that she quit smoking about 42 years ago. Her smoking use included cigarettes. She started smoking about 50 years ago. She has a 4.50 pack-year smoking history. She has never used smokeless tobacco.      Appropriate preventive services were discussed with this patient, including applicable screening as appropriate for cardiovascular disease, diabetes, osteopenia/osteoporosis, and glaucoma.  As appropriate for age/gender, discussed screening for colorectal cancer, prostate cancer, breast cancer, and cervical cancer. Checklist reviewing preventive services available has been given to the patient.    Reviewed patients plan of care and provided an AVS. The Basic Care Plan (routine screening as documented in Health Maintenance) for Arabella meets the Care Plan requirement. This Care Plan has been established and reviewed with the Patient.          THOMAS [facility]  [place]

## 2023-09-06 NOTE — PATIENT INSTRUCTIONS
Patient Education   Personalized Prevention Plan  You are due for the preventive services outlined below.  Your care team is available to assist you in scheduling these services.  If you have already completed any of these items, please share that information with your care team to update in your medical record.  Health Maintenance Due   Topic Date Due    COVID-19 Vaccine (6 - Moderna series) 04/12/2023    ANNUAL REVIEW OF HM ORDERS  08/31/2023    Flu Vaccine (1) 09/01/2023    Annual Wellness Visit  08/31/2023        The 10-year ASCVD risk score (Racheal SÁNCHEZ, et al., 2019) is: 18.8%    Values used to calculate the score:      Age: 73 years      Sex: Female      Is Non- : No      Diabetic: No      Tobacco smoker: No      Systolic Blood Pressure: 136 mmHg      Is BP treated: Yes      HDL Cholesterol: 57 mg/dL      Total Cholesterol: 305 mg/dL     Recheck cholesterol in 3 months     Try Flexeril at bedtime as needed for hip and back pain

## 2023-10-02 DIAGNOSIS — B00.1 COLD SORE: ICD-10-CM

## 2023-10-03 RX ORDER — VALACYCLOVIR HYDROCHLORIDE 1 G/1
TABLET, FILM COATED ORAL
Qty: 30 TABLET | Refills: 4 | Status: SHIPPED | OUTPATIENT
Start: 2023-10-03 | End: 2024-09-12

## 2023-10-11 ENCOUNTER — OFFICE VISIT (OUTPATIENT)
Dept: DERMATOLOGY | Facility: CLINIC | Age: 73
End: 2023-10-11
Payer: COMMERCIAL

## 2023-10-11 DIAGNOSIS — L82.1 SEBORRHEIC KERATOSIS: ICD-10-CM

## 2023-10-11 DIAGNOSIS — L81.4 LENTIGO: Primary | ICD-10-CM

## 2023-10-11 DIAGNOSIS — D18.01 ANGIOMA OF SKIN: ICD-10-CM

## 2023-10-11 DIAGNOSIS — B35.1 ONYCHOMYCOSIS: ICD-10-CM

## 2023-10-11 DIAGNOSIS — D23.9 DERMAL NEVUS: ICD-10-CM

## 2023-10-11 PROCEDURE — 99213 OFFICE O/P EST LOW 20 MIN: CPT | Performed by: DERMATOLOGY

## 2023-10-11 NOTE — LETTER
10/11/2023         RE: Arabella Rodríguez  [address] [place]  [facility] [address]        Dear Colleague,    Thank you for referring your patient, Arabella Rodríguez, to the [facility]. Please see a copy of my visit note below.    Arabella Rodríguez is an extremely pleasant 73 year old year old female patient here today for toenail changes.  USing penlaq.  Patient has no other skin complaints today.  Remainder of the HPI, Meds, PMH, Allergies, FH, and SH was reviewed in chart.      Past Medical History:   Diagnosis Date     Arthritis Repa [place] 2016     Hypertension      Osteopenia      Thyroid nodules [facility]     benign        Past Surgical History:   Procedure Laterality Date     ABDOMEN SURGERY  1986         BIOPSY  2014    Thyroid     C ANESTH, SECTION       COLONOSCOPY       COLONOSCOPY N/A 2018    Procedure: COLONOSCOPY;  colonoscopy;  Surgeon: Xu Feliciano MD;  Location: [facility]     EXCISE MASS WRIST Left 2016    Procedure: EXCISE MASS WRIST;  Surgeon: Germain Hull MD;  Location: [facility]     ORTHOPEDIC SURGERY  2016    I had a benign tumor removed from my wrist.     TONSILLECTOMY          Family History   Problem Relation Age of Onset     Lipids Mother      Arthritis Mother      Hypertension Mother      Hyperlipidemia Mother      Osteopenia Mother      Hypertension Father      Hyperlipidemia Father      Prostate Cancer Father      Hypertension Paternal Grandfather      Neurologic Disorder Brother         brain tumor     Other Cancer Brother          Brain Surgery/ Brain Surgery     Depression Sister         early      Depression Niece      Other Cancer Brother          Brain Surgery;  Recurrence     Depression Niece              Depression Sister         early      Diabetes No family hx of      Thyroid Cancer No family hx of      Thyroid Disease No family hx of        Social  History     Socioeconomic History     Marital status:      Spouse name: Not on file     Number of children: Not on file     Years of education: Not on file     Highest education level: Not on file   Occupational History     Employer: [employer]   Tobacco Use     Smoking status: Former     Packs/day: 0.50     Years: 9.00     Additional pack years: 0.00     Total pack years: 4.50     Types: Cigarettes     Start date: 10/1/1972     Quit date: 1981     Years since quittin.4     Smokeless tobacco: Never     Tobacco comments:     I quit in    Substance and Sexual Activity     Alcohol use: Yes     Comment: 1 o 2 then switch  to na     Drug use: No     Sexual activity: Not Currently     Partners: Male     Birth control/protection: Post-menopausal   Other Topics Concern     Parent/sibling w/ CABG, MI or angioplasty before 65F 55M? No      Service No     Blood Transfusions No     Caffeine Concern Yes     Comment: 3 cups cofffee a day     Occupational Exposure No     Hobby Hazards No     Sleep Concern No     Stress Concern No     Weight Concern No     Special Diet Yes     Comment: calcium with D one a day     Back Care No     Exercise Yes     Comment: couple times a wk     Bike Helmet No     Seat Belt Yes     Self-Exams No   Social History Narrative     Not on file     Social Determinants of Health     Financial Resource Strain: Not on file   Food Insecurity: Not on file   Transportation Needs: Not on file   Physical Activity: Not on file   Stress: Not on file   Social Connections: Not on file   Interpersonal Safety: Not on file   Housing Stability: Not on file       Outpatient Encounter Medications as of 10/11/2023   Medication Sig Dispense Refill     amLODIPine (NORVASC) 2.5 MG tablet Take 1 tablet (2.5 mg) by mouth daily 90 tablet 3     CALCIUM + D OR 1 TABLET ORALLY DAILY       ciclopirox (PENLAC) 8 % external solution Apply topically daily 6 mL 0     ciclopirox (PENLAC) 8 %  external solution Apply topically daily 6 mL 0     fluticasone (FLONASE) 50 MCG/ACT nasal spray INHALE 1-2 SPRAYS INTO EACH NOSTRIL ONCE DAILY 48 g 1     losartan-hydrochlorothiazide (HYZAAR) 100-25 MG tablet TAKE ONE TABLET BY MOUTH ONCE DAILY 90 tablet 3     MULTI-VITAMIN OR 1 TABLET ORALLY DAILY       triamcinolone (KENALOG) 0.1 % external cream Apply topically 2 times daily (Patient not taking: Reported on 2023) 30 g 0     valACYclovir (VALTREX) 1000 mg tablet COLD SORE: 2 TABS BY MOUTH TWICE DAILY FOR 2 DAYS. RASH ON LE/2 TAB TWICE DAILY FOR 3 DAYS 30 tablet 4     No facility-administered encounter medications on file as of 10/11/2023.             O:   NAD, WDWN, Alert & Oriented, Mood & Affect wnl, Vitals stable   Here today alone   General appearance normal   Vitals stable   Alert, oriented and in no acute distress   R great toenail onychomycosis      Stuck on papules and brown macules on trunk and ext   Red papules on trunk  Flesh colored papules on trunk     The remainder of the full exam was normal; the following areas were examined:  conjunctiva/lids, , neck, peripheral vascular system, abdomen, lymph nodes, digits/nails, eccrine and apocrine glands, scalp/hair, face, neck, chest, abdomen, buttocks, back, RUE, LUE, RLE, LLE       Eyes: Conjunctivae/lids:Normal     ENT: Lips, buccal mucosa, tongue: normal    MSK:Normal    Cardiovascular: peripheral edema none    Pulm: Breathing Normal    Lymph Nodes: No Head and Neck Lymphadenopathy     Neuro/Psych: Orientation:Alert and Orientedx3 ; Mood/Affect:normal       A/P:  1. Seborrheic keratosis, lentigo, angioma, dermal nevus  2. Onychomycosis   Oral discussed with patient she will think about  It was a pleasure speaking to Arabella Rodríguez today.  Previous clinic notes and pertinent laboratory tests were reviewed prior to Arabella Rodríguez's visit.  Nature and genetics of benign skin lesions dicussed with patient.  Signs and Symptoms of skin cancer  discussed with patient.  Patient encouraged to perform monthly skin exams.  UV precautions reviewed with patient.  Risks of non-melanoma skin cancer discussed with patient   Return to clinic 12 months      Again, thank you for allowing me to participate in the care of your patient.        Sincerely,        Elkin Hooks MD

## 2023-10-11 NOTE — NURSING NOTE
Arabella Rodríguez's chief complaint for this visit includes:  Chief Complaint   Patient presents with    Skin Check     Patient is here for a skin check and presents no specific concerns at this visit.      PCP: Gloria Escobedo    Referring Provider:  No referring provider defined for this encounter.    There were no vitals taken for this visit.  Data Unavailable        Allergies   Allergen Reactions    Formaldehyde Hives    Pravachol [Pravastatin] Muscle Pain (Myalgia)         Do you need any medication refills at today's visit? No    Niurka Vargas MA

## 2023-10-11 NOTE — PROGRESS NOTES
Arabella Rodríguez is an extremely pleasant 73 year old year old female patient here today for toenail changes.  USing penlaq.  Patient has no other skin complaints today.  Remainder of the HPI, Meds, PMH, Allergies, FH, and SH was reviewed in chart.      Past Medical History:   Diagnosis Date    Arthritis Repa [place] 2016    Hypertension     Osteopenia     Thyroid nodules [facility]     benign        Past Surgical History:   Procedure Laterality Date    ABDOMEN SURGERY  1986        BIOPSY  2014    Thyroid    C ANESTH, SECTION      COLONOSCOPY      COLONOSCOPY N/A 2018    Procedure: COLONOSCOPY;  colonoscopy;  Surgeon: Xu Feliciano MD;  Location: [facility]    EXCISE MASS WRIST Left 2016    Procedure: EXCISE MASS WRIST;  Surgeon: Germain Hull MD;  Location: [facility]    ORTHOPEDIC SURGERY  2016    I had a benign tumor removed from my wrist.    TONSILLECTOMY          Family History   Problem Relation Age of Onset    Lipids Mother     Arthritis Mother     Hypertension Mother     Hyperlipidemia Mother     Osteopenia Mother     Hypertension Father     Hyperlipidemia Father     Prostate Cancer Father     Hypertension Paternal Grandfather     Neurologic Disorder Brother         brain tumor    Other Cancer Brother          Brain Surgery/ Brain Surgery    Depression Sister         early     Depression Niece     Other Cancer Brother          Brain Surgery;  Recurrence    Depression Niece             Depression Sister         early     Diabetes No family hx of     Thyroid Cancer No family hx of     Thyroid Disease No family hx of        Social History     Socioeconomic History    Marital status:      Spouse name: Not on file    Number of children: Not on file    Years of education: Not on file    Highest education level: Not on file   Occupational History     Employer: [employer]   Tobacco Use    Smoking status:  Former     Packs/day: 0.50     Years: 9.00     Additional pack years: 0.00     Total pack years: 4.50     Types: Cigarettes     Start date: 10/1/1972     Quit date: 1981     Years since quittin.4    Smokeless tobacco: Never    Tobacco comments:     I quit in    Substance and Sexual Activity    Alcohol use: Yes     Comment: 1 o 2 then switch  to na    Drug use: No    Sexual activity: Not Currently     Partners: Male     Birth control/protection: Post-menopausal   Other Topics Concern    Parent/sibling w/ CABG, MI or angioplasty before 65F 55M? No     Service No    Blood Transfusions No    Caffeine Concern Yes     Comment: 3 cups cofffee a day    Occupational Exposure No    Hobby Hazards No    Sleep Concern No    Stress Concern No    Weight Concern No    Special Diet Yes     Comment: calcium with D one a day    Back Care No    Exercise Yes     Comment: couple times a wk    Bike Helmet No    Seat Belt Yes    Self-Exams No   Social History Narrative    Not on file     Social Determinants of Health     Financial Resource Strain: Not on file   Food Insecurity: Not on file   Transportation Needs: Not on file   Physical Activity: Not on file   Stress: Not on file   Social Connections: Not on file   Interpersonal Safety: Not on file   Housing Stability: Not on file       Outpatient Encounter Medications as of 10/11/2023   Medication Sig Dispense Refill    amLODIPine (NORVASC) 2.5 MG tablet Take 1 tablet (2.5 mg) by mouth daily 90 tablet 3    CALCIUM + D OR 1 TABLET ORALLY DAILY      ciclopirox (PENLAC) 8 % external solution Apply topically daily 6 mL 0    ciclopirox (PENLAC) 8 % external solution Apply topically daily 6 mL 0    fluticasone (FLONASE) 50 MCG/ACT nasal spray INHALE 1-2 SPRAYS INTO EACH NOSTRIL ONCE DAILY 48 g 1    losartan-hydrochlorothiazide (HYZAAR) 100-25 MG tablet TAKE ONE TABLET BY MOUTH ONCE DAILY 90 tablet 3    MULTI-VITAMIN OR 1 TABLET ORALLY DAILY      triamcinolone (KENALOG) 0.1 %  external cream Apply topically 2 times daily (Patient not taking: Reported on 2023) 30 g 0    valACYclovir (VALTREX) 1000 mg tablet COLD SORE: 2 TABS BY MOUTH TWICE DAILY FOR 2 DAYS. RASH ON LE/2 TAB TWICE DAILY FOR 3 DAYS 30 tablet 4     No facility-administered encounter medications on file as of 10/11/2023.             O:   NAD, WDWN, Alert & Oriented, Mood & Affect wnl, Vitals stable   Here today alone   General appearance normal   Vitals stable   Alert, oriented and in no acute distress   R great toenail onychomycosis      Stuck on papules and brown macules on trunk and ext   Red papules on trunk  Flesh colored papules on trunk     The remainder of the full exam was normal; the following areas were examined:  conjunctiva/lids, , neck, peripheral vascular system, abdomen, lymph nodes, digits/nails, eccrine and apocrine glands, scalp/hair, face, neck, chest, abdomen, buttocks, back, RUE, LUE, RLE, LLE       Eyes: Conjunctivae/lids:Normal     ENT: Lips, buccal mucosa, tongue: normal    MSK:Normal    Cardiovascular: peripheral edema none    Pulm: Breathing Normal    Lymph Nodes: No Head and Neck Lymphadenopathy     Neuro/Psych: Orientation:Alert and Orientedx3 ; Mood/Affect:normal       A/P:  1. Seborrheic keratosis, lentigo, angioma, dermal nevus  2. Onychomycosis   Oral discussed with patient she will think about  It was a pleasure speaking to Arabella Rodríguez today.  Previous clinic notes and pertinent laboratory tests were reviewed prior to Arabella Rodríguez's visit.  Nature and genetics of benign skin lesions dicussed with patient.  Signs and Symptoms of skin cancer discussed with patient.  Patient encouraged to perform monthly skin exams.  UV precautions reviewed with patient.  Risks of non-melanoma skin cancer discussed with patient   Return to clinic 12 months

## 2023-11-20 ENCOUNTER — HOSPITAL ENCOUNTER (EMERGENCY)
Facility: CLINIC | Age: 73
Discharge: HOME | End: 2023-11-20
Attending: NURSE PRACTITIONER | Admitting: NURSE PRACTITIONER
Payer: COMMERCIAL

## 2023-11-20 VITALS
OXYGEN SATURATION: 97 % | DIASTOLIC BLOOD PRESSURE: 78 MMHG | SYSTOLIC BLOOD PRESSURE: 153 MMHG | RESPIRATION RATE: 16 BRPM | HEART RATE: 98 BPM | TEMPERATURE: 98.1 F

## 2023-11-20 DIAGNOSIS — J06.9 VIRAL UPPER RESPIRATORY ILLNESS: ICD-10-CM

## 2023-11-20 PROCEDURE — G0463 HOSPITAL OUTPT CLINIC VISIT: HCPCS | Performed by: NURSE PRACTITIONER

## 2023-11-20 PROCEDURE — 99213 OFFICE O/P EST LOW 20 MIN: CPT | Performed by: NURSE PRACTITIONER

## 2023-11-20 ASSESSMENT — ACTIVITIES OF DAILY LIVING (ADL): ADLS_ACUITY_SCORE: 35

## 2023-11-20 NOTE — DISCHARGE INSTRUCTIONS
Follow-up with your primary if symptoms worsen despite recommended treatment plan.  Recommend over-the-counter Flonase treatment and manner shown in urgent care today.

## 2023-11-20 NOTE — ED TRIAGE NOTES
Pt reports loss of voice and post nasal drip since 11/17/23  Pt states RSV vaccination last week

## 2023-11-22 ENCOUNTER — MYC MEDICAL ADVICE (OUTPATIENT)
Dept: FAMILY MEDICINE | Facility: CLINIC | Age: 73
End: 2023-11-22
Payer: COMMERCIAL

## 2023-11-22 DIAGNOSIS — J01.00 ACUTE NON-RECURRENT MAXILLARY SINUSITIS: Primary | ICD-10-CM

## 2023-11-22 NOTE — ED PROVIDER NOTES
ED Provider Note  [facility]      History     Chief Complaint   Patient presents with    Nasal Congestion     HPI  Arabella Rodríguez is a 73 year old female who presents with nasal and sinus congestion for the last 2 days following an upper respiratory infection.  Denies any purulent nasal drainage or productive cough.  Denies recent fever, chills, nausea, vomiting, diarrhea.  Presents today with significant other today who has sinusitis symptoms she came back today wondering if what he has is something that is contagious and if she may be getting what he has.  Denies testing positive for RSV, influenza or COVID.  She is vaccinated for all of these.            Allergies:  Allergies   Allergen Reactions    Formaldehyde Hives    Pravachol [Pravastatin] Muscle Pain (Myalgia)       Problem List:    Patient Active Problem List    Diagnosis Date Noted    Primary osteoarthritis of both hips 06/06/2023     Priority: Medium    Chronic bilateral low back pain with right-sided sciatica 06/06/2023     Priority: Medium    Skin lesion 03/31/2015     Priority: Medium    CARDIOVASCULAR SCREENING; LDL GOAL LESS THAN 130 03/31/2015     Priority: Medium    Multiple thyroid nodules 08/24/2012     Priority: Medium    Hypertension 06/25/2012     Priority: Medium     Lisinopril caused cough -switched to losartan      Hyperlipidemia LDL goal <130 06/25/2012     Priority: Medium    Low bone density 02/22/2011     Priority: Medium     [facility] dexa Dual energy x-ray absorptiometry was performed on this 63.0 year old White Female, who reports - a history of: postmenopausal status, reformed tobacco habit, thyroid nodules, family history of osteoporosis or fractures - the following current treatments: Calcium, Vitamin D, lipid lowering agents _________________________________________________________________________ Technical quality: Satisfactory.  _________________________________________________________________________ Densitometry results: Comparison: 2013, 2010 Region Date BMD T - score Z - score BMD change from baseline BMD % change from baseline L1-L4 2013 1.149 g/cm  -0.3 1.4 -0.011 g/cm  -0.9% L1-L4 2010 1.160 g/cm  -0.2 1.3 baseline baseline Neck Left 2013 0.914 g/cm  -0.9 0.7 Neck Left 2010 0.967 g/cm  -0.5 0.9 Total Left 2013 0.959 g/cm  -0.4 0.9 0.006 g/cm  0.6% Total Left 2010 0.953 g/cm  -0.4 0.7 baseline baseline Neck Right 2013 0.853 g/cm  -1.3 0.2 Neck Right 2010 0.876 g/cm  -1.2 0.2 Total Right 2013 0.886 g/cm  -1.0 0.3 -0.003 g/cm  -0.3% Total Right 2010 0.889 g/cm  -0.9 0.2 baseline baseline _________________________________________________________________________ FORMATTED RESULTS WITH TABLES ARE LOCATED IN [facility] UNDER CHART REVIEW < ENCOUNTERS < PROVIDER <  2013 . The following are the conclusions and suggestions: Conclusions: Based on the most negative and valid T-score of -1.3 at the level of the right femoral neck, this patient has low bone density. The risk of osteoporotic fracture increases approximately 2-fold for each 1.0 SD decrease in T-score.       Thyroid cyst 2011     Priority: Medium    Xeroderma 2010     Priority: Medium    Herpes simplex type 1 infection 2010     Priority: Medium        Past Medical History:    Past Medical History:   Diagnosis Date    Arthritis Repa [place] 2016    Hypertension     Osteopenia     Thyroid nodules [facility]        Past Surgical History:    Past Surgical History:   Procedure Laterality Date    ABDOMEN SURGERY  1986        BIOPSY  2014    Thyroid    C ANESTH, SECTION      COLONOSCOPY      COLONOSCOPY N/A 2018    Procedure: COLONOSCOPY;  colonoscopy;  Surgeon: Xu Feliciano MD;  Location: [facility]    EXCISE MASS WRIST Left 2016    Procedure: EXCISE  MASS WRIST;  Surgeon: Germain Hull MD;  Location: [facility]    ORTHOPEDIC SURGERY  2016    I had a benign tumor removed from my wrist.    TONSILLECTOMY         Family History:    Family History   Problem Relation Age of Onset    Lipids Mother     Arthritis Mother     Hypertension Mother     Hyperlipidemia Mother     Osteopenia Mother     Hypertension Father     Hyperlipidemia Father     Prostate Cancer Father     Hypertension Paternal Grandfather     Neurologic Disorder Brother         brain tumor    Other Cancer Brother          Brain Surgery/ Brain Surgery    Depression Sister         early     Depression Niece     Other Cancer Brother          Brain Surgery;  Recurrence    Depression Niece             Depression Sister         early     Diabetes No family hx of     Thyroid Cancer No family hx of     Thyroid Disease No family hx of        Social History:  Marital Status:   [2]  Social History     Tobacco Use    Smoking status: Former     Packs/day: 0.50     Years: 9.00     Additional pack years: 0.00     Total pack years: 4.50     Types: Cigarettes     Start date: 10/1/1972     Quit date: 1981     Years since quittin.5    Smokeless tobacco: Never    Tobacco comments:     I quit in    Substance Use Topics    Alcohol use: Yes     Comment: 1 o 2 then switch  to na    Drug use: No        Medications:    amLODIPine (NORVASC) 2.5 MG tablet  amoxicillin-clavulanate (AUGMENTIN) 875-125 MG tablet  CALCIUM + D OR  ciclopirox (PENLAC) 8 % external solution  ciclopirox (PENLAC) 8 % external solution  fluticasone (FLONASE) 50 MCG/ACT nasal spray  losartan-hydrochlorothiazide (HYZAAR) 100-25 MG tablet  MULTI-VITAMIN OR  triamcinolone (KENALOG) 0.1 % external cream  valACYclovir (VALTREX) 1000 mg tablet          Review of Systems  A medically appropriate review of systems was performed with pertinent positives and negatives noted in the HPI, and all other systems  negative.    Physical Exam   No data found.       Physical Exam  General: alert and in no acute distress on arrival  Ears/Nose/Throat: ENT: Ear exam bilateral normal, normal TMs normal canals.  Nose: No erythema or edema patent nostrils bilateral.  Throat: Supple no adenopathy.   Head: atraumatic, normocephalic, no pain with palpation of sinuses bilateral  Lungs:  nonlabored, CTA bilateral throughout  CV: Regular rate and rhythm, extremities warm and perfused  Skin: no rashes, no diaphoresis and skin color normal  Neuro: Patient awake, alert, speech is fluent, appropriate historian  Psychiatric: affect/mood normal, pleasant      ED Course        MDM: Upper viral respiratory infection: Denies need for testing for RSV, influenza, COVID or strep throat.  Reassurance provided that it appears that she is on the downside of this infection however using conservative treatments if she is not improving and symptoms worsen despite recommended conservative treatments at home she should return or be seen with her primary clinic.         Procedures                  No results found for this or any previous visit (from the past 24 hour(s)).    MEDICATIONS GIVEN IN THE EMERGENCY DEPARTMENT:  Medications - No data to display             Assessments & Plan (with Medical Decision Making)  73 year old female who presents to the Urgent Care for evaluation of upper respiratory illness viral       I have reviewed the nursing notes.  I have reviewed the findings, diagnosis, plan and need for follow up with the patient.  If not improving and symptoms worsen despite recommended conservative treatments at home she should return or be seen with her primary clinic.      NEW PRESCRIPTIONS STARTED AT TODAY'S ER VISIT  Discharge Medication List as of 11/20/2023  3:36 PM          Final diagnoses:   Viral upper respiratory illness       11/20/2023   [facility] EMERGENCY DEPT       Lucy Yoon APRN CNP  11/22/23 1154

## 2024-01-19 ENCOUNTER — MYC MEDICAL ADVICE (OUTPATIENT)
Dept: FAMILY MEDICINE | Facility: CLINIC | Age: 74
End: 2024-01-19
Payer: COMMERCIAL

## 2024-01-19 DIAGNOSIS — B35.1 TOENAIL FUNGUS: Primary | ICD-10-CM

## 2024-01-19 RX ORDER — CICLOPIROX 80 MG/ML
SOLUTION TOPICAL DAILY
Qty: 6 ML | Refills: 3 | Status: SHIPPED | OUTPATIENT
Start: 2024-01-19 | End: 2024-09-12

## 2024-01-21 DIAGNOSIS — R30.0 DYSURIA: Primary | ICD-10-CM

## 2024-01-21 RX ORDER — SULFAMETHOXAZOLE/TRIMETHOPRIM 800-160 MG
1 TABLET ORAL 2 TIMES DAILY
Qty: 6 TABLET | Refills: 0 | Status: SHIPPED | OUTPATIENT
Start: 2024-01-21 | End: 2024-01-24

## 2024-03-27 ENCOUNTER — OFFICE VISIT (OUTPATIENT)
Dept: ENDOCRINOLOGY | Facility: CLINIC | Age: 74
End: 2024-03-27
Payer: COMMERCIAL

## 2024-03-27 VITALS
HEIGHT: 62 IN | DIASTOLIC BLOOD PRESSURE: 85 MMHG | OXYGEN SATURATION: 98 % | SYSTOLIC BLOOD PRESSURE: 174 MMHG | HEART RATE: 73 BPM | WEIGHT: 121 LBS | BODY MASS INDEX: 22.26 KG/M2

## 2024-03-27 DIAGNOSIS — M85.9 LOW BONE DENSITY: ICD-10-CM

## 2024-03-27 DIAGNOSIS — E04.2 MULTIPLE THYROID NODULES: Primary | ICD-10-CM

## 2024-03-27 DIAGNOSIS — Z78.0 POSTMENOPAUSAL STATUS: ICD-10-CM

## 2024-03-27 PROCEDURE — 99205 OFFICE O/P NEW HI 60 MIN: CPT

## 2024-03-27 PROCEDURE — G2211 COMPLEX E/M VISIT ADD ON: HCPCS

## 2024-03-27 RX ORDER — CALCIUM CITRATE/VITAMIN D3 315MG-6.25
1 TABLET ORAL DAILY
COMMUNITY

## 2024-03-27 RX ORDER — DIAZEPAM 2 MG
TABLET ORAL
Qty: 2 TABLET | Refills: 0 | Status: SHIPPED | OUTPATIENT
Start: 2024-03-27

## 2024-03-27 ASSESSMENT — PAIN SCALES - GENERAL: PAINLEVEL: NO PAIN (0)

## 2024-03-27 NOTE — PROGRESS NOTES
Endocrinology clinic  note    Assessment / Plan  1.  Nodular goiter - multiple thyroid nodules  A) left isthmus nodule 1.1 x 0.7 x 1.3 has increased in volume 827% since 2018.  Recommend FNAB due to location and significant increase in size.  Discussed, ordered.  Ordered valium for premed to hopefully have better efficacy than last time.     Addendum  8/12/24 thyroid US compared with 8/10/2023 ,  7/24/2020 , 9/9/18 -  Right isthmus # 1 0.4 x 0.3 x 0.6 was 0.5 x 0.4  x 0.9 was right lobe # 3 cyst 2.4 x 1.1 x 2.4 mixed > 50% cystic (was 1.4 x 0.9 x   Right # 2 anechoic cyst  0.5 x 0.6 x 0.8 cm   Right # 2  0.7 x 0.6 x 0.7 cm was # 3 hypoechoic  0.6 x 0.6 x 0.7 was Right posterior  # 2 ;  0.7 x 0.6 x 0.7 cm (was 0.9 x 0.7 x   Right # 3 0.8 x 0.7 x 0.9 was #4 low eggshell calc 0.7 x 0.7 x 0.9 cm was Right # 1 1.3 x 1 x 1.2 cm - shadowing eggshell calc-- (was 1.5 x 1.1 x  this had FNAB 10/16/19 insufficient  Left # 5 isthmus  1,3 x 0.7 x 1.4 (volume 0.64 ml) was 1.1 x 0.7 x 1.3 cm  (0.5 cm3) was 0.8 x 0.5 x 1.1 cm (0.22 cm3); was 0.6  X 0.3 x 0.6  (0.05 cm3) - this  has increased 8.26 fold since 2018  10/1/24 FNAB AUS     2.  Low BMD.    Next DXA 12/2024      The Longitudinal plan of care for nodular thyroid, low BMD was addressed during this visit. Due to added complexity of care, we will continue to support Arabella , and the subsequent management of this condition(s) and with the ongoing continuity of care of this condition.    58__ minutes spent on the date of the encounter doing chart review, history and exam, documentation and further activities as noted above.     Sherice Estrada MD.      Cc/ HPI :  73 year old woman returns for follow up of goiter/ thyroid nodule and low BMD.   She was last seen by me 12/2020 .  Since then, she had thyroid US  x 2 (not ordered by me so I am seeing results today for the first time) , and DXA .  The intention was to see me after the imaging in 2022 but she didn't schedule the  follow up appt until 2022 after the imaging was done and then she couldn't get in until now. .  .   Goiter had been lst noted 6/2010 during a routine exam.  8/11/10 calcitonin was < 2.  8/24/12 BRADEN < 20, TPO < 10. She has had normal TFTS  2010 US FNAB of a complex/ cystic right thyroid nodule (# 2, as defined on the 6/30/10 US)  benign cytology.    9/23/14: Right cyst fluid and mural component: benign  10/16/19 FNAB right # 1 nodiagnostic . Today she is reminding me that she did not like the procedure. She got Ativan and felt she could have used something more.  She is not eager to have another FNAB as I have recommended today for the isthmus nodule that has increased in size since 2018, but she is agreeable.  She is asking for premed .  She notes she could have used more than I gave her last time.     I have reviewed the images on PACS today (as read by me)  8/10/2023 thyroid US compared with  7/24/2020 thyroid US: compared with 9/9/18 -- I showed Dayan the images today.   Right isthmus # 1 0.5 x 0.4  x 0.9 was right lobe # 3 cyst 2.4 x 1.1 x 2.4 mixed > 50% cystic (was 1.4 x 0.9 x   Right # 2 anechoic cyst  0.5 x 0.6 x 0.8 cm   Right # 3 hypoechoic  0.6 x 0.6 x 0.7 was Right posterior  # 2 ;  0.7 x 0.6 x 0.7 cm (was 0.9 x 0.7 x   Right # 4 low eggshell calc 0.7 x 0.7 x 0.9 cm was Right # 1 1.3 x 1 x 1.2 cm - shadowing eggshell calc-- (was 1.5 x 1.1 x  this had FNAB 10/16/19 insufficient  Left # 5 isthmus 1.1 x 0.7 x 1.3 cm  (0.5 cm3) was 0.8 x 0.5 x 1.1 cm (0.22 cm3); was 0.6  X 0.3 x 0.6  (0.05 cm3) - this  has increased 8.26 fold since 2018 12/6/22 DXA compared with 10/21/2020,     lowest T-score -1.5 left femoral neck  L spine 1.059 was 1.076- was 1.142 ; -1.6% compared with 2020  Left total hip 0.928 was  0.930; was 0.880-- stable   Right total hip 0.830 was 0.841; was 0.831 -stable   Mean total femur 0.879 was 0.886 ; -0.8% compared with 2020       ROS   Hip pain in PT, hasn't had any steroid injections;  its been doing a little better recently      Family Hx   Family History   Problem Relation Age of Onset    Lipids Mother     Arthritis Mother     Hypertension Mother     Hyperlipidemia Mother     Osteopenia Mother     Hypertension Father     Hyperlipidemia Father     Prostate Cancer Father     Hypertension Paternal Grandfather     Neurologic Disorder Brother         brain tumor    Other Cancer Brother          Brain Surgery/ Brain Surgery    Depression Sister         early     Depression Niece     Other Cancer Brother          Brain Surgery;  Recurrence    Depression Niece             Depression Sister         early     Diabetes No family hx of     Thyroid Cancer No family hx of     Thyroid Disease No family hx of      brain tumor in brother - presented with seizure  .  Personal Hx   Behavioral history: No tobacco use.  Home environment: No secondhand tobacco smoke in home.  ; walks every day about 30 minutes    PMH   Past Medical History:   Diagnosis Date    Arthritis [place] [place]     Hypertension     Osteopenia     Thyroid nodules [facility]     benign      Past Surgical History:   Procedure Laterality Date    ABDOMEN SURGERY  1986        BIOPSY  2014    Thyroid    C ANESTH, SECTION      COLONOSCOPY      COLONOSCOPY N/A 2018    Procedure: COLONOSCOPY;  colonoscopy;  Surgeon: Xu Feliciano MD;  Location: [facility]    EXCISE MASS WRIST Left 2016    Procedure: EXCISE MASS WRIST;  Surgeon: Germain Hull MD;  Location: [facility]    ORTHOPEDIC SURGERY  2016    I had a benign tumor removed from my wrist.    TONSILLECTOMY         Current Outpatient Medications   Medication Sig Dispense Refill    amLODIPine (NORVASC) 2.5 MG tablet Take 1 tablet (2.5 mg) by mouth daily 90 tablet 3    calcium citrate-vitamin D (CITRACAL) 315-6.25 MG-MCG TABS per tablet Take 1 tablet by mouth daily 1 per day and has 500mg of calcium       "ciclopirox (PENLAC) 8 % external solution Apply topically daily 6 mL 3    ciclopirox (PENLAC) 8 % external solution Apply topically daily 6 mL 0    fluticasone (FLONASE) 50 MCG/ACT nasal spray INHALE 1-2 SPRAYS INTO EACH NOSTRIL ONCE DAILY 48 g 1    losartan-hydrochlorothiazide (HYZAAR) 100-25 MG tablet TAKE ONE TABLET BY MOUTH ONCE DAILY 90 tablet 3    MULTI-VITAMIN OR 1 TABLET ORALLY DAILY      UNABLE TO FIND MEDICATION NAME: preservision ared2 generic      valACYclovir (VALTREX) 1000 mg tablet COLD SORE: 2 TABS BY MOUTH TWICE DAILY FOR 2 DAYS. RASH ON LE/2 TAB TWICE DAILY FOR 3 DAYS 30 tablet 4    triamcinolone (KENALOG) 0.1 % external cream Apply topically 2 times daily (Patient not taking: Reported on 2023) 30 g 0     Preservision    Physical Exam   GENERAL no mask  BP (!) 174/85 (BP Location: Right arm, Patient Position: Sitting, Cuff Size: Adult Regular)   Pulse 73   Ht 1.575 m (5' 2\")   Wt 54.9 kg (121 lb)   SpO2 98%   BMI 22.13 kg/m    SKIN: normal color, temperature, texture without hirsutism, alopecia or purple striae  HEENT: PER, no scleral icterus, eyelid retraction, stare, lid lag, proptosis or conjunctival injection.  .    NECK: visible round nodule sternal notch appears to be approx 1.5 cm diameter.  This is easily palpable, firm, round in exam. .  No other  visible or palpable neck masses, cervical adenopathy.   LUNGS: clear to auscultation bilaterally.   CARDIAC: RRR, S1, S2 without murmurs, rubs or gallops.    BACK: normal spinal contour.    NEURO: Alert, responds appropriately to questions,  moves all extremities, DTRs 0/4, gait normal, no tremor of the outstretched hand    DATA REVIEW:     Latest Ref Rng 2022  8:24 AM 8/10/2023  8:27 AM   ENDO THYROID LABS-[facility]      TSH 0.40 - 4.00 mU/L 3.05     TSH 0.30 - 4.20 uIU/mL  3.02        ULTRASOUND THYROID  8/10/2023 9:08 AMCLINICAL HISTORY: Multiple thyroid nodules.TECHNIQUE: Thyroid ultrasound. COMPARISON: 2022    " FINDINGS:  RIGHT lobe: 4.6 x 1.6 x 1.1 cm. Homogeneous echotexture.  Isthmus: 4 mm.  LEFT lobe: 3.7 x 1.2 x 1.1 cm. Homogeneous echotexture.  NECK: No cervical lymphadenopathy.  NODULES:  Nodule 1: Right upper pole thyroid nodule measuring 9 x 5 x 4 mm,  previously 11 x 10 x 8 mm.   Composition: Solid or almost completely solid, 2 points   Echogenicity: Hypoechoic, 2 points   Shape: Not taller than wide, 0 points   Margin: Smooth, 0 points   Echogenic Foci: None, or large comet-tail artifacts, 0 points   Point Total: 4-6 points. TI-RADS 4. If 1.5 cm or larger, recommend  FNA; if 1 cm or larger, follow up US (annually for 5 years).    Nodule 2: Right upper pole thyroid nodule measuring 8 x 6 x 5 mm.  Composition: Cystic or almost completely cystic, 0 points   Echogenicity: Anechoic, 0 points   Shape: Not taller than wide, 0 points   Margin: Smooth, 0 points   Echogenic Foci: None, or large comet-tail artifacts, 0 points   Point Total: 0 points. TI-RADS 1. No FNA.    Nodule 3: Right mid pole thyroid nodule measuring 7 x 6 x 6 mm,  previously 9 x 9 x 6 mm.  Composition: Spongiform, 0 points   Echogenicity: Hypoechoic, 2 points   Shape: Not taller than wide, 0 points   Margin: Smooth, 0 points   Echogenic Foci: None, or large comet-tail artifacts, 0 points   Point Total: 1-2 points. TI-RADS 2. No FNA.     Nodule 4: Right lower pole thyroid nodule measuring 9 x 7 x 7 mm,  previously 10 x 8 x 8 mm.  Composition: Solid or almost completely solid, 2 points   Echogenicity: Hypoechoic, 2 points   Shape: Not taller than wide, 0 points   Margin: Smooth, 0 points   Echogenic Foci: Peripheral (rim) calcifications, 2 points   Point Total: 4-6 points. TI-RADS 4. If 1.5 cm or larger, recommend  FNA; if 1 cm or larger, follow up US (annually for 5 years).     Nodule 5: Isthmus nodule measuring 13 x 11 x 7 mm, previously 11 x 11  x 7 mm.  Composition: Solid or almost completely solid, 2 points   Echogenicity: Hypoechoic, 2 points    Shape: Wider-than-tall, 0 points   Margin: Smooth, 0 points   Echogenic Foci: None, or large comet-tail artifacts, 0 points   Point Total: 4-6 points. TI-RADS 4. If 1.5 cm or larger, recommend  FNA; if 1 cm or larger, follow up US (annually for 5 years).                                                     IMPRESSION:  Similar-appearing thyroid nodules, which do not meet criteria for fine-needle aspiration.  Nodules are characterized per  ACR Thyroid Imaging, Reporting and Data System (TI-RADS): White Paper  of the ACR TI-RADS Committee  Robert Linda et al. Journal of the American College of  Radiology 2017. Volume 14 (2017), Issue 5, 297-756.   MICHAEL GRAVES MD         Narrative & Impression   US THYROID 8/12/2024 10:50 AM  CLINICAL HISTORY: required by you for FNAB; Multiple thyroid nodules  TECHNIQUE: Thyroid ultrasound.   COMPARISON: 8/10/2023, 7/26/2022   FINDINGS:  RIGHT lobe: 3.8 x 1.1 x 1.2 cm. Homogeneous echotexture.  Isthmus: 3 mm.  LEFT lobe: 3.5 x 1.2 x 0.9 cm. Homogeneous echotexture.  NECK: No cervical lymphadenopathy.  NODULES:  Nodule 1: Right upper lobe/isthmus junction 6 x 4 x 3 mm, decreased in  size previously measuring 9 x 5 x 4 mm.  Composition: Solid or almost completely solid, 2 points   Echogenicity: Hypoechoic, 2 points   Shape: Wider-than-tall, 0 points   Margin: Smooth, 0 points   Echogenic Foci: None, or large comet-tail artifacts, 0 points   Point Total: 4-6 points. TI-RADS 4. If 1.5 cm or larger, recommend  FNA; if 1 cm or larger, follow up US (annually for 5 years).      Nodule 2: Right mid 7 x 7 x 6 mm, unchanged previously measuring 7 x 6  x 6 mm  Composition: Solid or almost completely solid, 2 points   Echogenicity: Hypoechoic, 2 points   Shape: Wider-than-tall, 0 points   Margin: Smooth, 0 points   Echogenic Foci: None, or large comet-tail artifacts, 0 points   Point Total: 4-6 points. TI-RADS 4. If 1.5 cm or larger, recommend  FNA; if 1 cm or larger, follow up US  (annually for 5 years).     Nodule 3: Right inferior, 9 x 8 x 7 mm, unchanged, previously  measuring 9 x 7 x 7 mm  Composition: Solid or almost completely solid, 2 points   Echogenicity: Hypoechoic, 2 points   Shape: Not taller than wide, 0 points   Margin: Smooth, 0 points   Echogenic Foci: Peripheral (rim) calcifications, 2 points   Point Total: 4-6 points. TI-RADS 4. If 1.5 cm or larger, recommend  FNA; if 1 cm or larger, follow up US (annually for 5 years).  Nodule 4: Left side of isthmus, 14 x 13 x 7 mm, previously 13 x 11 x 7 mm  Composition: Solid or almost completely solid, 2 points   Echogenicity: Hypoechoic, 2 points   Shape: Wider-than-tall, 0 points   Margin: Smooth, 0 points   Echogenic Foci: None, or large comet-tail artifacts, 0 points   Point Total: 4-6 points. TI-RADS 4. If 1.5 cm or larger, recommend  FNA; if 1 cm or larger, follow up US (annually for 5 years)                                                            IMPRESSION:  1.  No significant interval change in multiple thyroid nodules other than a decrease in size of one of the nodule at the junction of the  right thyroid lobe and isthmus. Follow-up ultrasound is recommended in 1 year.  Nodules are characterized per  ACR Thyroid Imaging, Reporting and Data System (TI-RADS): White Paper  of the ACR TI-RADS Committee  Robert Linda et al. Journal of the American College of  Radiology 2017. Volume 14 (2017), Issue 5, 484-312.   ABELARDO SU MD            EXAMINATION: US BIOPSY THYROID FINE NEEDLE ASPIRATION, 10/1/2024 10:31  AM  COMPARISON: Ultrasound 8/12/2024  HISTORY: Increased nodule volume 826% since 2018; isthmus location  FINDINGS: After describing the risks, benefits, and alternatives to  ultrasound guided isthmus thyroid nodule fine needle aspiration, the  patient elected to proceed and written and an informed consent was  obtained.  The patient was then placed supine and preliminary grayscale images  demonstrated isthmus  nodule, corresponding to nodule #4 on prior  ultrasound. The patient was then prepped and draped in a sterile  fashion. Local anesthesia was achieved using 3 cc of 1% lidocaine.  Under direct sonographic guidance, 4 passes of the nodule were  performed using 25-gauge needles. Preliminary interpretation from  pathology suggests adequate cellularity for diagnosis. There were no  immediate complications.                                                             IMPRESSION: Technically successful ultrasound-guided isthmus thyroid  fine-needle aspiration.     CARIDAD GIMENEZ,     Fine Needle Aspirate Thyroid Isthmus: [id number]  Order: [id number]  Collected 10/1/2024 10:13 AM       Status: Final result       Visible to patient: Yes (seen)       Dx: Multiple thyroid nodules; Postmenopau...    0 Result Notes      Component  Ref Range & Units    Final Diagnosis   Specimen A                 Interpretation:                  Atypia of Undetermined Significance (AUS)  Other, see comment      The [place] implied risk of malignancy and recommended clinical management:  Atypia of undetermined significance diagnosis has a 22 (13-30)% risk of malignancy. Molecular testing, repeat FNA (least 4-6 weeks from previous aspiration with optimal time being 12 weeks after last aspiration), diagnostic lobectomy, or surveillance is recommended.                    Adequacy:                 Satisfactory for evaluation         Electronically signed by Addy Travis III, MD on 10/3/2024 at 11:12 AM   Comment    The specimen is moderately cellular without discernable colloid and shows predominantly Hurthle cells. Given the plasmacytoid nature of some of the cells present, please consider obtaining serum calcitonin levels. The findings are supportive of AUS with atypia categorized as 'other.'   Clinical Information    74F with isthmus nodule   Rapid Onsite Evaluation    FNA Performance:   Fine needle aspiration was not performed by [facility] staff.     Aspirate immediate study/adequacy:  I, ANANT HASTINGS III, MD, attest that I immediately examined smears while the procedure was underway and determined or confirmed the adequacy of the specimens via telepathology.     It is of note that the final assessment and report may be performed and signed by a different pathologist.     Onsite adequacy/interpretation:  A: Adequate      Gross Description    A(A). Thyroid Isthmus, left isthmus #4:A. Thyroid Isthmus, left isthmus #4, Fine Needle Aspirate:  Received are 3 fixed slides, processed for Pap stain, and 3 air dried slides, processed for Diff Quik stain.  Afirma tube held.      Microscopic Description    A microscopic examination was performed.      Case was reviewed by the following:  Pathology Fellow: Sherice Lockwood DO  Pathology Fellow: Yessica Mccall MD  Resident Pathologist: Maggie Cameron MD  A resident or fellow in a training program was involved in the initial review, preparation, and/or interpretation of this case.  I, as the senior physician, attest that I have personally reviewed all specimens and or slides, including the listed special stains, and used them with my medical judgement to determine the final diagnosis.               Abnormal Result?  No Yes Abnormal    Performing Labs    The technical component of this testing was completed at [facility].      Stain controls for all stains resulted within this report have been reviewed and show appropriate reactivity.    Resulting Agency [facility]

## 2024-03-27 NOTE — PATIENT INSTRUCTIONS
Recommend fine needle aspiration biopsy left isthmus thyroid nodule    Next DXA 12/6/2024 or after     Please call and schedule at one of these locations that provide the service you need.     DEXA, CT, MRI, US, XRAY    [facility]   ([facility] [place], [place] [phone]   [facility] ([place], [place]) [phone]   [facility] ([facility]) [phone]   [facility] ([facility]) [phone]         CALCIUM Recommendation 1200 mg/day in divided dose of no more than 500 mg/dose. This includes what is in your food and also what is in any supplements you are taking.      Dietary sources of calcium: These also contain vitamin D  Milk / buttermilk              8 oz            300 mg  Dry milk powder       5 Tbsp             300 mg  Yogurt                          1 cup           400 mg  Ice cream   1/2 cup          100 mg  Hard cheese                     1.5 oz          300 mg  Cottage cheese                1/2 cup        65 mg  Spinach, cooked  1 cup  240 mg  Tofu, soft regular           4 oz          120 to 390 mg  Sardines                       3 oz               370 mg  Mackerel canned         3 oz                250 mg  Canned salmon with bones 3 oz        170-210 mg  Calcium fortified cereals are available  SILK soy and almond milk products are calcium fortified  Orange juice  Fortified with Calcium       8 oz            300 mg  Low fat dairy sources are recommended      Recommended exercise goals:    30 minutes of moderate exercise on most days of the week .  Weight bearing exercise (ie, walking, jogging, hiking, dancing)    Strength training 2 or more times/week in addition to other weight -being exercise.  Strength training -- uses weight or resistance beyond that seen in everyday activities -(pilates, weight training with free weights, weight machines or resistance bands)

## 2024-03-27 NOTE — NURSING NOTE
"Chief Complaint   Patient presents with    Thyroid Problem     Vital signs:      BP: (!) 174/85 Pulse: 73     SpO2: 98 %     Height: 157.5 cm (5' 2\") Weight: 54.9 kg (121 lb)  Estimated body mass index is 22.13 kg/m  as calculated from the following:    Height as of this encounter: 1.575 m (5' 2\").    Weight as of this encounter: 54.9 kg (121 lb).        "

## 2024-03-27 NOTE — LETTER
3/27/2024       RE: Arabella Rodríguez  [address]  [facility] [address]     Dear Colleague,    Thank you for referring your patient, Arabella Rodríguez, to the [facility] ENDOCRINOLOGY [facility] [place] [facility]. Please see a copy of my visit note below.    3/20/2024  PATIENT LAB/IMAGING STATUS : No pending lab orders       Endocrinology clinic  note    Assessment / Plan  1.  Nodular goiter - multiple thyroid nodules  A) left isthmus nodule 1.1 x 0.7 x 1.3 has increased in volume 827% since 2018.  Recommend FNAB due to location and significant increase in size.  Discussed, ordered.  Ordered valium for premed to hopefully have better efficacy than last time.     2.  Low BMD.    Next DXA 12/2024      The Longitudinal plan of care for nodular thyroid, low BMD was addressed during this visit. Due to added complexity of care, we will continue to support Arabella , and the subsequent management of this condition(s) and with the ongoing continuity of care of this condition.    58__ minutes spent on the date of the encounter doing chart review, history and exam, documentation and further activities as noted above.     Sherice Estrada MD.      Cc/ HPI :  73 year old woman returns for follow up of goiter/ thyroid nodule and low BMD.   She was last seen by me 12/2020 .  Since then, she had thyroid US  x 2 (not ordered by me so I am seeing results today for the first time) , and DXA .  The intention was to see me after the imaging in 2022 but she didn't schedule the follow up appt until 2022 after the imaging was done and then she couldn't get in until now. .  .   Goiter had been lst noted 6/2010 during a routine exam.  8/11/10 calcitonin was < 2.  8/24/12 BRADEN < 20, TPO < 10. She has had normal TFTS  2010 US FNAB of a complex/ cystic right thyroid nodule (# 2, as defined on the 6/30/10 US)  benign cytology.    9/23/14: Right cyst fluid and mural  component: benign  10/16/19 FNAB right # 1 nodiagnostic . Today she is reminding me that she did not like the procedure. She got Ativan and felt she could have used something more.  She is not eager to have another FNAB as I have recommended today for the isthmus nodule that has increased in size since 2018, but she is agreeable.  She is asking for premed .  She notes she could have used more than I gave her last time.     I have reviewed the images on PACS today (as read by me)  8/10/2023 thyroid US compared with  7/24/2020 thyroid US: compared with 9/9/18 -- I showed Dayan the images today.   Right isthmus # 1 0.5 x 0.4  x 0.9 was right lobe # 3 cyst 2.4 x 1.1 x 2.4 mixed > 50% cystic (was 1.4 x 0.9 x   Right # 2 anechoic cyst  0.5 x 0.6 x 0.8 cm   Right # 3 hypoechoic  0.6 x 0.6 x 0.7 was Right posterior  # 2 ;  0.7 x 0.6 x 0.7 cm (was 0.9 x 0.7 x   Right # 4 low eggshell calc 0.7 x 0.7 x 0.9 cm was Right # 1 1.3 x 1 x 1.2 cm - shadowing eggshell calc-- (was 1.5 x 1.1 x  this had FNAB 10/16/19 insufficient  Left # 5 isthmus 1.1 x 0.7 x 1.3 cm  (0.5 cm3) was 0.8 x 0.5 x 1.1 cm (0.22 cm3); was 0.6  X 0.3 x 0.6  (0.05 cm3) - this  has increased 8.26 fold since 2018    12/6/22 DXA compared with 10/21/2020,     lowest T-score -1.5 left femoral neck  L spine 1.059 was 1.076- was 1.142 ; -1.6% compared with 2020  Left total hip 0.928 was  0.930; was 0.880-- stable   Right total hip 0.830 was 0.841; was 0.831 -stable   Mean total femur 0.879 was 0.886 ; -0.8% compared with 2020       ROS   Hip pain in PT, hasn't had any steroid injections; its been doing a little better recently      Family Hx   Family History   Problem Relation Age of Onset    Lipids Mother     Arthritis Mother     Hypertension Mother     Hyperlipidemia Mother     Osteopenia Mother     Hypertension Father     Hyperlipidemia Father     Prostate Cancer Father     Hypertension Paternal Grandfather     Neurologic Disorder Brother         brain tumor     Other Cancer Brother          Brain Surgery/ Brain Surgery    Depression Sister         early     Depression Niece     Other Cancer Brother          Brain Surgery;  Recurrence    Depression Niece             Depression Sister         early     Diabetes No family hx of     Thyroid Cancer No family hx of     Thyroid Disease No family hx of      brain tumor in brother - presented with seizure  .  Personal Hx   Behavioral history: No tobacco use.  Home environment: No secondhand tobacco smoke in home.  ; walks every day about 30 minutes    PMH   Past Medical History:   Diagnosis Date    Arthritis [place] 2016    Hypertension     Osteopenia     Thyroid nodules [facility]     benign      Past Surgical History:   Procedure Laterality Date    ABDOMEN SURGERY  1986        BIOPSY  2014    Thyroid    C ANESTH, SECTION      COLONOSCOPY      COLONOSCOPY N/A 2018    Procedure: COLONOSCOPY;  colonoscopy;  Surgeon: Xu Feliciano MD;  Location: [facility]    EXCISE MASS WRIST Left 2016    Procedure: EXCISE MASS WRIST;  Surgeon: Germain Hull MD;  Location: [facility]    ORTHOPEDIC SURGERY  2016    I had a benign tumor removed from my wrist.    TONSILLECTOMY         Current Outpatient Medications   Medication Sig Dispense Refill    amLODIPine (NORVASC) 2.5 MG tablet Take 1 tablet (2.5 mg) by mouth daily 90 tablet 3    calcium citrate-vitamin D (CITRACAL) 315-6.25 MG-MCG TABS per tablet Take 1 tablet by mouth daily 1 per day and has 500mg of calcium      ciclopirox (PENLAC) 8 % external solution Apply topically daily 6 mL 3    ciclopirox (PENLAC) 8 % external solution Apply topically daily 6 mL 0    fluticasone (FLONASE) 50 MCG/ACT nasal spray INHALE 1-2 SPRAYS INTO EACH NOSTRIL ONCE DAILY 48 g 1    losartan-hydrochlorothiazide (HYZAAR) 100-25 MG tablet TAKE ONE TABLET BY MOUTH ONCE DAILY 90 tablet 3    MULTI-VITAMIN OR 1 TABLET ORALLY  "DAILY      UNABLE TO FIND MEDICATION NAME: preservision ared2 generic      valACYclovir (VALTREX) 1000 mg tablet COLD SORE: 2 TABS BY MOUTH TWICE DAILY FOR 2 DAYS. RASH ON LE/2 TAB TWICE DAILY FOR 3 DAYS 30 tablet 4    triamcinolone (KENALOG) 0.1 % external cream Apply topically 2 times daily (Patient not taking: Reported on 2023) 30 g 0     Preservision    Physical Exam   GENERAL no mask  BP (!) 174/85 (BP Location: Right arm, Patient Position: Sitting, Cuff Size: Adult Regular)   Pulse 73   Ht 1.575 m (5' 2\")   Wt 54.9 kg (121 lb)   SpO2 98%   BMI 22.13 kg/m    SKIN: normal color, temperature, texture without hirsutism, alopecia or purple striae  HEENT: PER, no scleral icterus, eyelid retraction, stare, lid lag, proptosis or conjunctival injection.  .    NECK: visible round nodule sternal notch appears to be approx 1.5 cm diameter.  This is easily palpable, firm, round in exam. .  No other  visible or palpable neck masses, cervical adenopathy.   LUNGS: clear to auscultation bilaterally.   CARDIAC: RRR, S1, S2 without murmurs, rubs or gallops.    BACK: normal spinal contour.    NEURO: Alert, responds appropriately to questions,  moves all extremities, DTRs 0/4, gait normal, no tremor of the outstretched hand    DATA REVIEW:     Latest Ref Rng 2022  8:24 AM 8/10/2023  8:27 AM   ENDO THYROID LABS-[facility]      TSH 0.40 - 4.00 mU/L 3.05     TSH 0.30 - 4.20 uIU/mL  3.02        ULTRASOUND THYROID  8/10/2023 9:08 AMCLINICAL HISTORY: Multiple thyroid nodules.TECHNIQUE: Thyroid ultrasound. COMPARISON: 2022    FINDINGS:  RIGHT lobe: 4.6 x 1.6 x 1.1 cm. Homogeneous echotexture.  Isthmus: 4 mm.  LEFT lobe: 3.7 x 1.2 x 1.1 cm. Homogeneous echotexture.  NECK: No cervical lymphadenopathy.  NODULES:  Nodule 1: Right upper pole thyroid nodule measuring 9 x 5 x 4 mm,  previously 11 x 10 x 8 mm.   Composition: Solid or almost completely solid, 2 points   Echogenicity: Hypoechoic, 2 points   Shape: Not taller than " wide, 0 points   Margin: Smooth, 0 points   Echogenic Foci: None, or large comet-tail artifacts, 0 points   Point Total: 4-6 points. TI-RADS 4. If 1.5 cm or larger, recommend  FNA; if 1 cm or larger, follow up US (annually for 5 years).    Nodule 2: Right upper pole thyroid nodule measuring 8 x 6 x 5 mm.  Composition: Cystic or almost completely cystic, 0 points   Echogenicity: Anechoic, 0 points   Shape: Not taller than wide, 0 points   Margin: Smooth, 0 points   Echogenic Foci: None, or large comet-tail artifacts, 0 points   Point Total: 0 points. TI-RADS 1. No FNA.    Nodule 3: Right mid pole thyroid nodule measuring 7 x 6 x 6 mm,  previously 9 x 9 x 6 mm.  Composition: Spongiform, 0 points   Echogenicity: Hypoechoic, 2 points   Shape: Not taller than wide, 0 points   Margin: Smooth, 0 points   Echogenic Foci: None, or large comet-tail artifacts, 0 points   Point Total: 1-2 points. TI-RADS 2. No FNA.     Nodule 4: Right lower pole thyroid nodule measuring 9 x 7 x 7 mm,  previously 10 x 8 x 8 mm.  Composition: Solid or almost completely solid, 2 points   Echogenicity: Hypoechoic, 2 points   Shape: Not taller than wide, 0 points   Margin: Smooth, 0 points   Echogenic Foci: Peripheral (rim) calcifications, 2 points   Point Total: 4-6 points. TI-RADS 4. If 1.5 cm or larger, recommend  FNA; if 1 cm or larger, follow up US (annually for 5 years).     Nodule 5: Isthmus nodule measuring 13 x 11 x 7 mm, previously 11 x 11  x 7 mm.  Composition: Solid or almost completely solid, 2 points   Echogenicity: Hypoechoic, 2 points   Shape: Wider-than-tall, 0 points   Margin: Smooth, 0 points   Echogenic Foci: None, or large comet-tail artifacts, 0 points   Point Total: 4-6 points. TI-RADS 4. If 1.5 cm or larger, recommend  FNA; if 1 cm or larger, follow up US (annually for 5 years).                                                            IMPRESSION:  Similar-appearing thyroid nodules, which do not meet criteria for fine-needle  aspiration.     Nodules are characterized per  ACR Thyroid Imaging, Reporting and Data System (TI-RADS): White Paper  of the ACR TI-RADS Committee  Robert Linda et al. Journal of the American College of  Radiology 2017. Volume 14 (2017), Issue 5, 587595.      MD Sherice COBURN MD

## 2024-04-04 ENCOUNTER — TELEPHONE (OUTPATIENT)
Dept: ENDOCRINOLOGY | Facility: CLINIC | Age: 74
End: 2024-04-04
Payer: COMMERCIAL

## 2024-04-04 NOTE — TELEPHONE ENCOUNTER
Left Voicemail (1st Attempt) and Sent Mychart (1st Attempt) for the patient to call back and schedule the following:    Appointment type: FNAB and DXA  Provider: per Dr. Estrada  Return date: next avail FNAB// 12/6/24 or after DEXA  Check Out Comments: Schedule thyroid nodule FNAB in any [facility] US location (not IR) DXa 12/6/2024 or after

## 2024-04-05 DIAGNOSIS — E04.2 MULTIPLE THYROID NODULES: Primary | ICD-10-CM

## 2024-04-17 NOTE — TELEPHONE ENCOUNTER
Spoke with patient- they said they have to wait to schedule an US prior to FNA and will wait to schedule DEXA until it is closer. Patient has imaging scheduling number and will call to schedule all the placed orders.

## 2024-07-17 DIAGNOSIS — I10 BENIGN ESSENTIAL HYPERTENSION: ICD-10-CM

## 2024-07-17 RX ORDER — LOSARTAN POTASSIUM AND HYDROCHLOROTHIAZIDE 25; 100 MG/1; MG/1
1 TABLET ORAL DAILY
Qty: 90 TABLET | Refills: 3 | Status: SHIPPED | OUTPATIENT
Start: 2024-07-17

## 2024-07-17 NOTE — TELEPHONE ENCOUNTER
Has appointment scheduled for 9/12/24.      Requested Prescriptions   Pending Prescriptions Disp Refills    losartan-hydrochlorothiazide (HYZAAR) 100-25 MG tablet [Pharmacy Med Name: LOSARTAN POTASSIUM-HCTZ 100-25 TABS] 90 tablet 3     Sig: TAKE ONE TABLET BY MOUTH ONCE DAILY       Angiotensin-II Receptors Failed - 7/17/2024  9:32 AM        Failed - Last blood pressure under 140/90 in past 12 months     BP Readings from Last 3 Encounters:   03/27/24 (!) 174/85 11/20/23 (!) 153/78 09/06/23 136/72       No data recorded            Passed - Medication is active on med list        Passed - Has GFR on file in past 12 months and most recent value is normal        Passed - Medication indicated for associated diagnosis     The medication is prescribed for one or more of the following conditions:    Chronic Kidney Disease (CDK)    Heart Failure (HF)    Diabetes, Nephropathy   Hypertension    Coronary Artery Disease (CAD)   Raynaud's Disease          Passed - Recent (12 mo) or future (90days) visit within the authorizing provider's specialty     The patient must have completed an in-person or virtual visit within the past 12 months or has a future visit scheduled within the next 90 days with the authorizing provider s specialty.  Urgent care and e-visits do not quality as an office visit for this protocol.          Passed - Patient is age 18 or older        Passed - No active pregnancy on record        Passed - Normal serum potassium on file in past 12 months     Recent Labs   Lab Test 08/10/23  0827   POTASSIUM 3.6                    Passed - No positive pregnancy test in past 12 months       Diuretics (Including Combos) Protocol Failed - 7/17/2024  9:32 AM        Failed - Blood pressure under 140/90 in past 12 months     BP Readings from Last 3 Encounters:   03/27/24 (!) 174/85 11/20/23 (!) 153/78 09/06/23 136/72       No data recorded            Passed - Medication is active on med list        Passed - Medication  indicated for associated diagnosis     Medication is associated with one or more of the following diagnoses:     Edema   Hypertension   Heart Failure   Meniere's Disease          Passed - Has GFR on file in past 12 months and most recent value is normal        Passed - Recent (12 mo) or future (90 days) visit within the authorizing provider's specialty     The patient must have completed an in-person or virtual visit within the past 12 months or has a future visit scheduled within the next 90 days with the authorizing provider s specialty.  Urgent care and e-visits do not quality as an office visit for this protocol.          Passed - Patient is age 18 or older        Passed - No active pregancy on record        Passed - No positive pregnancy test in past 12 months

## 2024-08-12 ENCOUNTER — HOSPITAL ENCOUNTER (OUTPATIENT)
Dept: ULTRASOUND IMAGING | Facility: CLINIC | Age: 74
Discharge: HOME | End: 2024-08-12
Payer: COMMERCIAL

## 2024-08-12 DIAGNOSIS — E04.2 MULTIPLE THYROID NODULES: ICD-10-CM

## 2024-08-12 PROCEDURE — 76536 US EXAM OF HEAD AND NECK: CPT

## 2024-08-15 ENCOUNTER — MYC MEDICAL ADVICE (OUTPATIENT)
Dept: FAMILY MEDICINE | Facility: CLINIC | Age: 74
End: 2024-08-15
Payer: COMMERCIAL

## 2024-08-15 DIAGNOSIS — E78.5 HYPERLIPIDEMIA LDL GOAL <130: Primary | ICD-10-CM

## 2024-08-15 DIAGNOSIS — I10 PRIMARY HYPERTENSION: ICD-10-CM

## 2024-08-15 DIAGNOSIS — E04.2 MULTIPLE THYROID NODULES: ICD-10-CM

## 2024-08-15 NOTE — TELEPHONE ENCOUNTER
Gloria,    Please advise on pre-visit labs.    Thank you  Hebert ROWE RN  [facility]

## 2024-09-05 ENCOUNTER — LAB (OUTPATIENT)
Dept: LAB | Facility: CLINIC | Age: 74
End: 2024-09-05
Payer: COMMERCIAL

## 2024-09-05 DIAGNOSIS — E78.5 HYPERLIPIDEMIA LDL GOAL <130: ICD-10-CM

## 2024-09-05 DIAGNOSIS — I10 PRIMARY HYPERTENSION: ICD-10-CM

## 2024-09-05 DIAGNOSIS — E04.2 MULTIPLE THYROID NODULES: ICD-10-CM

## 2024-09-05 LAB
ALBUMIN SERPL BCG-MCNC: 4.9 G/DL (ref 3.5–5.2)
ALP SERPL-CCNC: 55 U/L (ref 40–150)
ALT SERPL W P-5'-P-CCNC: 19 U/L (ref 0–50)
ANION GAP SERPL CALCULATED.3IONS-SCNC: 9 MMOL/L (ref 7–15)
AST SERPL W P-5'-P-CCNC: 26 U/L (ref 0–45)
BILIRUB SERPL-MCNC: 0.3 MG/DL
BUN SERPL-MCNC: 16.8 MG/DL (ref 8–23)
CALCIUM SERPL-MCNC: 9.9 MG/DL (ref 8.8–10.4)
CHLORIDE SERPL-SCNC: 96 MMOL/L (ref 98–107)
CHOLEST SERPL-MCNC: 291 MG/DL
CREAT SERPL-MCNC: 0.76 MG/DL (ref 0.51–0.95)
EGFRCR SERPLBLD CKD-EPI 2021: 82 ML/MIN/1.73M2
ERYTHROCYTE [DISTWIDTH] IN BLOOD BY AUTOMATED COUNT: 12.2 % (ref 10–15)
FASTING STATUS PATIENT QL REPORTED: YES
FASTING STATUS PATIENT QL REPORTED: YES
GLUCOSE SERPL-MCNC: 98 MG/DL (ref 70–99)
HCO3 SERPL-SCNC: 26 MMOL/L (ref 22–29)
HCT VFR BLD AUTO: 38.1 % (ref 35–47)
HDLC SERPL-MCNC: 60 MG/DL
HGB BLD-MCNC: 12.4 G/DL (ref 11.7–15.7)
LDLC SERPL CALC-MCNC: 194 MG/DL
MCH RBC QN AUTO: 29.2 PG (ref 26.5–33)
MCHC RBC AUTO-ENTMCNC: 32.5 G/DL (ref 31.5–36.5)
MCV RBC AUTO: 90 FL (ref 78–100)
NONHDLC SERPL-MCNC: 231 MG/DL
PLATELET # BLD AUTO: 304 10E3/UL (ref 150–450)
POTASSIUM SERPL-SCNC: 4.1 MMOL/L (ref 3.4–5.3)
PROT SERPL-MCNC: 7.1 G/DL (ref 6.4–8.3)
RBC # BLD AUTO: 4.24 10E6/UL (ref 3.8–5.2)
SODIUM SERPL-SCNC: 131 MMOL/L (ref 135–145)
TRIGL SERPL-MCNC: 187 MG/DL
TSH SERPL DL<=0.005 MIU/L-ACNC: 2.63 UIU/ML (ref 0.3–4.2)
WBC # BLD AUTO: 6.5 10E3/UL (ref 4–11)

## 2024-09-05 PROCEDURE — 80053 COMPREHEN METABOLIC PANEL: CPT

## 2024-09-05 PROCEDURE — 84443 ASSAY THYROID STIM HORMONE: CPT

## 2024-09-05 PROCEDURE — 80061 LIPID PANEL: CPT

## 2024-09-05 PROCEDURE — 36415 COLL VENOUS BLD VENIPUNCTURE: CPT

## 2024-09-05 PROCEDURE — 85027 COMPLETE CBC AUTOMATED: CPT

## 2024-09-12 ENCOUNTER — OFFICE VISIT (OUTPATIENT)
Dept: FAMILY MEDICINE | Facility: CLINIC | Age: 74
End: 2024-09-12
Attending: NURSE PRACTITIONER
Payer: COMMERCIAL

## 2024-09-12 ENCOUNTER — HOSPITAL ENCOUNTER (OUTPATIENT)
Dept: MAMMOGRAPHY | Facility: CLINIC | Age: 74
Discharge: HOME | End: 2024-09-12
Attending: NURSE PRACTITIONER | Admitting: NURSE PRACTITIONER
Payer: COMMERCIAL

## 2024-09-12 VITALS
OXYGEN SATURATION: 98 % | BODY MASS INDEX: 22.37 KG/M2 | HEIGHT: 61 IN | RESPIRATION RATE: 16 BRPM | HEART RATE: 78 BPM | WEIGHT: 118.5 LBS | TEMPERATURE: 96.5 F | SYSTOLIC BLOOD PRESSURE: 134 MMHG | DIASTOLIC BLOOD PRESSURE: 80 MMHG

## 2024-09-12 DIAGNOSIS — B35.1 TOENAIL FUNGUS: ICD-10-CM

## 2024-09-12 DIAGNOSIS — Z00.00 ANNUAL PHYSICAL EXAM: Primary | ICD-10-CM

## 2024-09-12 DIAGNOSIS — E78.5 HYPERLIPIDEMIA LDL GOAL <100: ICD-10-CM

## 2024-09-12 DIAGNOSIS — Z12.31 VISIT FOR SCREENING MAMMOGRAM: ICD-10-CM

## 2024-09-12 DIAGNOSIS — B00.1 COLD SORE: ICD-10-CM

## 2024-09-12 DIAGNOSIS — R09.81 NASAL CONGESTION: ICD-10-CM

## 2024-09-12 DIAGNOSIS — I10 PRIMARY HYPERTENSION: ICD-10-CM

## 2024-09-12 PROCEDURE — 77063 BREAST TOMOSYNTHESIS BI: CPT

## 2024-09-12 PROCEDURE — 99214 OFFICE O/P EST MOD 30 MIN: CPT | Mod: 25 | Performed by: NURSE PRACTITIONER

## 2024-09-12 PROCEDURE — G0439 PPPS, SUBSEQ VISIT: HCPCS | Performed by: NURSE PRACTITIONER

## 2024-09-12 RX ORDER — AMLODIPINE BESYLATE 2.5 MG/1
2.5 TABLET ORAL DAILY
Qty: 90 TABLET | Refills: 3 | Status: SHIPPED | OUTPATIENT
Start: 2024-09-12

## 2024-09-12 RX ORDER — EZETIMIBE 10 MG/1
5 TABLET ORAL DAILY
Qty: 45 TABLET | Refills: 0 | Status: SHIPPED | OUTPATIENT
Start: 2024-09-12

## 2024-09-12 RX ORDER — FLUTICASONE PROPIONATE 50 MCG
2 SPRAY, SUSPENSION (ML) NASAL DAILY
Qty: 48 G | Refills: 2 | Status: SHIPPED | OUTPATIENT
Start: 2024-09-12

## 2024-09-12 RX ORDER — VALACYCLOVIR HYDROCHLORIDE 1 G/1
TABLET, FILM COATED ORAL
Qty: 30 TABLET | Refills: 4 | Status: SHIPPED | OUTPATIENT
Start: 2024-09-12 | End: 2024-09-12

## 2024-09-12 RX ORDER — CICLOPIROX 80 MG/ML
SOLUTION TOPICAL DAILY
Qty: 6 ML | Refills: 0 | Status: SHIPPED | OUTPATIENT
Start: 2024-09-12 | End: 2024-09-12

## 2024-09-12 RX ORDER — VALACYCLOVIR HYDROCHLORIDE 1 G/1
TABLET, FILM COATED ORAL
Qty: 30 TABLET | Refills: 4 | Status: SHIPPED | OUTPATIENT
Start: 2024-09-12

## 2024-09-12 ASSESSMENT — PAIN SCALES - GENERAL: PAINLEVEL: NO PAIN (0)

## 2024-09-12 NOTE — PATIENT INSTRUCTIONS
The 10-year ASCVD risk score (Racheal SÁNCHEZ, et al., 2019) is: 19.7%    Values used to calculate the score:      Age: 74 years      Sex: Female      Is Non- : No      Diabetic: No      Tobacco smoker: No      Systolic Blood Pressure: 134 mmHg      Is BP treated: Yes      HDL Cholesterol: 60 mg/dL      Total Cholesterol: 291 mg/dL     Start Zetia 0.5 tablet daily  Try fish oil [id number] mg daily  Recheck lipid panel in 3 months

## 2024-09-12 NOTE — PROGRESS NOTES
Preventive Care Visit  [facility]  THOMAS Butler CNP, Family Medicine      Assessment & Plan     Annual physical exam  -normal exam     Primary hypertension  -well controlled  -continue current treatment plan   - amLODIPine (NORVASC) 2.5 MG tablet; Take 1 tablet (2.5 mg) by mouth daily.    Cold sore    - valACYclovir (VALTREX) 1000 mg tablet; Take one tablet twice daily for 2 days as needed for cold sores    Nasal congestion    - fluticasone (FLONASE) 50 MCG/ACT nasal spray; Spray 2 sprays into both nostrils daily.    Hyperlipidemia LDL goal <100    - REVIEW OF HEALTH MAINTENANCE PROTOCOL ORDERS  - start ezetimibe (ZETIA) 10 MG tablet; Take 0.5 tablets (5 mg) by mouth daily.  - Lipid panel reflex to direct LDL Fasting; Future, recheck in 3 months     [facility]ail fungus  [facility]       Counseling  Appropriate preventive services were addressed with this patient via screening, questionnaire, or discussion as appropriate for fall prevention, nutrition, physical activity, Tobacco-use cessation, social engagement, weight loss and cognition.  Checklist reviewing preventive services available has been given to the patient.  Reviewed patient's diet, addressing concerns and/or questions.   She is at risk for psychosocial distress and has been provided with information to reduce risk.   The patient was provided with written information regarding signs of hearing loss.     Chris Hanley is a 74 year old, presenting for the following:  Physical        9/12/2024     8:12 AM   Additional Questions   Roomed by rosalinda   Accompanied by self         9/12/2024     8:12 AM   Patient Reported Additional Medications   Patient reports taking the following new medications none           9/9/2024   General Health   How would you rate your overall physical health? Good   Feel stress (tense, anxious, or unable to sleep) Only a little      (!) STRESS CONCERN      9/9/2024   Nutrition   Diet: Low  salt    Low fat/cholesterol       Multiple values from one day are sorted in reverse-chronological order         9/9/2024   Exercise   Days per week of moderate/strenous exercise 5 days   Average minutes spent exercising at this level 20 min            9/9/2024   Social Factors   Frequency of gathering with friends or relatives More than three times a week   Worry food won't last until get money to buy more No   Food not last or not have enough money for food? No   Do you have housing? (Housing is defined as stable permanent housing and does not include staying ouside in a car, in a tent, in an abandoned building, in an overnight shelter, or couch-surfing.) Yes   Are you worried about losing your housing? No   Lack of transportation? No   Unable to get utilities (heat,electricity)? No            9/9/2024   Fall Risk   Fallen 2 or more times in the past year? No    No   Trouble with walking or balance? No    No       Multiple values from one day are sorted in reverse-chronological order          9/9/2024   Activities of Daily Living- Home Safety   Needs help with the following daily activites None of the above   Safety concerns in the home None of the above            9/9/2024   Dental   Dentist two times every year? Yes            9/9/2024   Hearing Screening   Hearing concerns? (!) IT'S HARD TO FOLLOW A CONVERSATION IN A NOISY RESTAURANT OR CROWDED ROOM.            9/9/2024   Driving Risk Screening   Patient/family members have concerns about driving No            9/9/2024   General Alertness/Fatigue Screening   Have you been more tired than usual lately? No            9/9/2024   Urinary Incontinence Screening   Bothered by leaking urine in past 6 months No            9/9/2024   TB Screening   Were you born outside of the [place]? No            Today's PHQ-2 Score:       9/11/2024    12:40 PM   PHQ-2 ( 1999 [employer])   Q1: Little interest or pleasure in doing things 0   Q2: Feeling down, depressed or hopeless 0   PHQ-2  Score 0   Q1: Little interest or pleasure in doing things Not at all   Q2: Feeling down, depressed or hopeless Not at all   PHQ-2 Score 0           2024   Substance Use   Alcohol more than 3/day or more than 7/wk No   Do you have a current opioid prescription? No   How severe/bad is pain from 1 to 10? 5/10   Do you use any other substances recreationally? No        Social History     Tobacco Use    Smoking status: Former     Current packs/day: 0.00     Average packs/day: 0.5 packs/day for 9.0 years (4.5 ttl pk-yrs)     Types: Cigarettes     Start date: 10/1/1972     Quit date: 1981     Years since quittin.3    Smokeless tobacco: Never    Tobacco comments:     I quit in    Vaping Use    Vaping status: Never Used   Substance Use Topics    Alcohol use: Yes     Comment: 1 o 2 then switch  to na    Drug use: No         2023   LAST FHS-7 RESULTS   1st degree relative breast or ovarian cancer No   Any relative bilateral breast cancer No   Any male have breast cancer No   Any ONE woman have BOTH breast AND ovarian cancer No   Any woman with breast cancer before 50yrs No   2 or more relatives with breast AND/OR ovarian cancer No   2 or more relatives with breast AND/OR bowel cancer No          Mammogram Screening - Mammogram every 1-2 years updated in Health Maintenance based on mutual decision making    ASCVD Risk   The 10-year ASCVD risk score (Racheal DK, et al., 2019) is: 19.7%    Values used to calculate the score:      Age: 74 years      Sex: Female      Is Non- : No      Diabetic: No      Tobacco smoker: No      Systolic Blood Pressure: 134 mmHg      Is BP treated: Yes      HDL Cholesterol: 60 mg/dL      Total Cholesterol: 291 mg/dL      Current providers sharing in care for this patient include:  Patient Care Team:  Gloria Escobedo APRN CNP as PCP - General (Nurse Practitioner - Gerontology)  Sherice Estrada MD as MD (INTERNAL MEDICINE - ENDOCRINOLOGY,  "DIABETES & METABOLISM)  Gloria Escobedo APRN CNP as Assigned PCP  Elkin Hooks MD as Assigned Surgical Provider  Jasmeet Yang MD as Assigned Rheumatology Provider  Sherice Estrada MD as Assigned Endocrinology Provider    The following health maintenance items are reviewed in Epic and correct as of today:  Health Maintenance   Topic Date Due    INFLUENZA VACCINE (1) 09/01/2024    COVID-19 Vaccine (7 - 2023-24 season) 09/01/2024    MEDICARE ANNUAL WELLNESS VISIT  09/06/2024    DTAP/TDAP/TD IMMUNIZATION (2 - Td or Tdap) 03/31/2025    LIPID  09/05/2025    MAMMO SCREENING  09/06/2025    ANNUAL REVIEW OF HM ORDERS  09/12/2025    FALL RISK ASSESSMENT  09/12/2025    RSV VACCINE (1 - 1-dose 75+ series) 06/19/2026    GLUCOSE  09/05/2027    COLORECTAL CANCER SCREENING  01/31/2028    ADVANCE CARE PLANNING  09/06/2028    DEXA  12/06/2037    HEPATITIS C SCREENING  Completed    PHQ-2 (once per calendar year)  Completed    Pneumococcal Vaccine: 65+ Years  Completed    ZOSTER IMMUNIZATION  Completed    HPV IMMUNIZATION  Aged Out    MENINGITIS IMMUNIZATION  Aged Out    RSV MONOCLONAL ANTIBODY  Aged Out    URINE DRUG SCREEN  Discontinued         Review of Systems  Constitutional, HEENT, cardiovascular, pulmonary, gi and gu systems are negative, except as otherwise noted.     Objective    Exam  /80   Pulse 78   Temp (!) 96.5  F (35.8  C) (Tympanic)   Resp 16   Ht 1.556 m (5' 1.25\")   Wt 53.8 kg (118 lb 8 oz)   SpO2 98%   BMI 22.21 kg/m     Estimated body mass index is 22.21 kg/m  as calculated from the following:    Height as of this encounter: 1.556 m (5' 1.25\").    Weight as of this encounter: 53.8 kg (118 lb 8 oz).    Physical Exam  GENERAL: alert and no distress  EYES: Eyes grossly normal to inspection, PERRL and conjunctivae and sclerae normal  HENT: ear canals and TM's normal, nose and mouth without ulcers or lesions  NECK: no adenopathy, no asymmetry, masses, or scars  RESP: lungs clear to " auscultation - no rales, rhonchi or wheezes  CV: regular rate and rhythm, normal S1 S2, no S3 or S4, no murmur, click or rub, no peripheral edema   MS: no gross musculoskeletal defects noted, no edema  SKIN: no suspicious lesions or rashes  NEURO: Normal strength and tone, mentation intact and speech normal  PSYCH: mentation appears normal, affect normal/bright         9/12/2024   Mini Cog   Clock Draw Score 2 Normal   3 Item Recall 3 objects recalled   Mini Cog Total Score 5            Signed Electronically by: THOMAS Butler CNP

## 2024-10-01 ENCOUNTER — ANCILLARY PROCEDURE (OUTPATIENT)
Dept: ULTRASOUND IMAGING | Facility: CLINIC | Age: 74
End: 2024-10-01
Payer: COMMERCIAL

## 2024-10-01 DIAGNOSIS — M85.9 LOW BONE DENSITY: ICD-10-CM

## 2024-10-01 DIAGNOSIS — E04.2 MULTIPLE THYROID NODULES: ICD-10-CM

## 2024-10-01 DIAGNOSIS — Z78.0 POSTMENOPAUSAL STATUS: ICD-10-CM

## 2024-10-01 PROCEDURE — 88172 CYTP DX EVAL FNA 1ST EA SITE: CPT | Mod: TC

## 2024-10-01 PROCEDURE — 10005 FNA BX W/US GDN 1ST LES: CPT | Performed by: STUDENT IN AN ORGANIZED HEALTH CARE EDUCATION/TRAINING PROGRAM

## 2024-10-01 PROCEDURE — 88172 CYTP DX EVAL FNA 1ST EA SITE: CPT | Mod: 26 | Performed by: PATHOLOGY

## 2024-10-01 PROCEDURE — 88173 CYTOPATH EVAL FNA REPORT: CPT | Mod: 26 | Performed by: PATHOLOGY

## 2024-10-01 RX ORDER — LIDOCAINE HYDROCHLORIDE 10 MG/ML
5 INJECTION, SOLUTION INFILTRATION; PERINEURAL ONCE
Status: COMPLETED | OUTPATIENT
Start: 2024-10-01 | End: 2024-10-01

## 2024-10-01 RX ADMIN — LIDOCAINE HYDROCHLORIDE 3 ML: 10 INJECTION, SOLUTION INFILTRATION; PERINEURAL at 10:04

## 2024-10-03 LAB
PATH REPORT.COMMENTS IMP SPEC: ABNORMAL
PATH REPORT.COMMENTS IMP SPEC: YES
PATH REPORT.FINAL DX SPEC: ABNORMAL
PATH REPORT.GROSS SPEC: ABNORMAL
PATH REPORT.MICROSCOPIC SPEC OTHER STN: ABNORMAL
PATH REPORT.RELEVANT HX SPEC: ABNORMAL

## 2024-10-15 DIAGNOSIS — E04.2 MULTIPLE THYROID NODULES: ICD-10-CM

## 2024-10-15 DIAGNOSIS — R89.6 ABNORMAL CYTOLOGY: Primary | ICD-10-CM

## 2024-10-17 ENCOUNTER — TELEPHONE (OUTPATIENT)
Dept: ENDOCRINOLOGY | Facility: CLINIC | Age: 74
End: 2024-10-17

## 2024-10-22 ENCOUNTER — OFFICE VISIT (OUTPATIENT)
Dept: DERMATOLOGY | Facility: CLINIC | Age: 74
End: 2024-10-22
Payer: COMMERCIAL

## 2024-10-22 DIAGNOSIS — L82.1 SEBORRHEIC KERATOSES: ICD-10-CM

## 2024-10-22 DIAGNOSIS — D18.01 ANGIOMA OF SKIN: ICD-10-CM

## 2024-10-22 DIAGNOSIS — D23.9 DERMAL NEVUS: Primary | ICD-10-CM

## 2024-10-22 DIAGNOSIS — L81.4 LENTIGO: ICD-10-CM

## 2024-10-22 PROCEDURE — 99213 OFFICE O/P EST LOW 20 MIN: CPT | Performed by: DERMATOLOGY

## 2024-10-22 ASSESSMENT — PAIN SCALES - GENERAL: PAINLEVEL: NO PAIN (0)

## 2024-10-22 NOTE — PROGRESS NOTES
Arabella Rodríguez is an extremely pleasant 74 year old year old female patient here today for spots on skin.  Patient has no other skin complaints today.  Remainder of the HPI, Meds, PMH, Allergies, FH, and SH was reviewed in chart.      Past Medical History:   Diagnosis Date    Arthritis Repa [place] 2016    Hypertension     Osteopenia     Thyroid nodules [facility]     benign        Past Surgical History:   Procedure Laterality Date    ABDOMEN SURGERY  1986        BIOPSY  2014    Thyroid    C ANESTH, SECTION      COLONOSCOPY      COLONOSCOPY N/A 2018    Procedure: COLONOSCOPY;  colonoscopy;  Surgeon: Xu Feliciano MD;  Location: [facility]    EXCISE MASS WRIST Left 2016    Procedure: EXCISE MASS WRIST;  Surgeon: Germain Hull MD;  Location: [facility]    ORTHOPEDIC SURGERY  2016    I had a benign tumor removed from my wrist.    TONSILLECTOMY          Family History   Problem Relation Age of Onset    Lipids Mother     Arthritis Mother     Hypertension Mother     Hyperlipidemia Mother     Osteopenia Mother     Macular Degeneration Mother 90    Hypertension Father     Hyperlipidemia Father     Prostate Cancer Father     Depression Sister         early     Depression Sister         early     Neurologic Disorder Brother         brain tumor    Other Cancer Brother          Brain Surgery/ Brain Surgery    Other Cancer Brother          Brain Surgery;  Recurrence    Hypertension Paternal Grandfather     Depression Niece     Depression Niece             Diabetes No family hx of     Thyroid Cancer No family hx of     Thyroid Disease No family hx of        Social History     Socioeconomic History    Marital status:      Spouse name: Not on file    Number of children: Not on file    Years of education: Not on file    Highest education level: Not on file   Occupational History     Employer: [employer]   Tobacco Use     Smoking status: Former     Current packs/day: 0.00     Average packs/day: 0.5 packs/day for 9.0 years (4.5 ttl pk-yrs)     Types: Cigarettes     Start date: 10/1/1972     Quit date: 1981     Years since quittin.5    Smokeless tobacco: Never    Tobacco comments:     I quit in    Vaping Use    Vaping status: Never Used   Substance and Sexual Activity    Alcohol use: Yes     Comment: 1 o 2 then switch  to na    Drug use: No    Sexual activity: Not Currently     Partners: Male     Birth control/protection: Post-menopausal   Other Topics Concern    Parent/sibling w/ CABG, MI or angioplasty before 65F 55M? No     Service No    Blood Transfusions No    Caffeine Concern Yes     Comment: 3 cups cofffee a day    Occupational Exposure No    Hobby Hazards No    Sleep Concern No    Stress Concern No    Weight Concern No    Special Diet Yes     Comment: calcium with D one a day    Back Care No    Exercise Yes     Comment: couple times a wk    Bike Helmet No    Seat Belt Yes    Self-Exams No   Social History Narrative    Not on file     Social Determinants of Health     Financial Resource Strain: Low Risk  (2024)    Financial Resource Strain     Within the past 12 months, have you or your family members you live with been unable to get utilities (heat, electricity) when it was really needed?: No   Food Insecurity: Low Risk  (2024)    Food Insecurity     Within the past 12 months, did you worry that your food would run out before you got money to buy more?: No     Within the past 12 months, did the food you bought just not last and you didn t have money to get more?: No   Transportation Needs: Low Risk  (2024)    Transportation Needs     Within the past 12 months, has lack of transportation kept you from medical appointments, getting your medicines, non-medical meetings or appointments, work, or from getting things that you need?: No   Physical Activity: Insufficiently Active (2024)    Exercise  Vital Sign     Days of Exercise per Week: 5 days     Minutes of Exercise per Session: 20 min   Stress: No Stress Concern Present (9/9/2024)    [employer] [place] [employer] - Occupational Stress Questionnaire     Feeling of Stress : Only a little   Social Connections: Unknown (9/9/2024)    Social Connection and Isolation Panel [NHANES]     Frequency of Communication with Friends and Family: Not on file     Frequency of Social Gatherings with Friends and Family: More than three times a week     Attends Sabianist Services: Not on file     Active Member of Clubs or Organizations: Not on file     Attends Club or Organization Meetings: Not on file     Marital Status: Not on file   Interpersonal Safety: Low Risk  (9/12/2024)    Interpersonal Safety     Do you feel physically and emotionally safe where you currently live?: Yes     Within the past 12 months, have you been hit, slapped, kicked or otherwise physically hurt by someone?: No     Within the past 12 months, have you been humiliated or emotionally abused in other ways by your partner or ex-partner?: No   Housing Stability: Low Risk  (9/9/2024)    Housing Stability     Do you have housing? : Yes     Are you worried about losing your housing?: No       Outpatient Encounter Medications as of 10/22/2024   Medication Sig Dispense Refill    amLODIPine (NORVASC) 2.5 MG tablet Take 1 tablet (2.5 mg) by mouth daily. 90 tablet 3    calcium citrate-vitamin D (CITRACAL) 315-6.25 MG-MCG TABS per tablet Take 1 tablet by mouth daily 1 per day and has 500mg of calcium      diazepam (VALIUM) 2 MG tablet 2 mg about 30 minutes before procedure.  May repeat x 1 .  Must have . (Patient not taking: Reported on 9/12/2024) 2 tablet 0    ezetimibe (ZETIA) 10 MG tablet Take 0.5 tablets (5 mg) by mouth daily. 45 tablet 0    fluticasone (FLONASE) 50 MCG/ACT nasal spray Spray 2 sprays into both nostrils daily. 48 g 2    losartan-hydrochlorothiazide (HYZAAR) 100-25 MG tablet  TAKE ONE TABLET BY MOUTH ONCE DAILY 90 tablet 3    MULTI-VITAMIN OR 1 TABLET ORALLY DAILY      triamcinolone (KENALOG) 0.1 % external cream Apply topically 2 times daily (Patient not taking: Reported on 9/6/2023) 30 g 0    UNABLE TO FIND MEDICATION NAME: preservision ared2 generic (Patient not taking: Reported on 9/12/2024)      valACYclovir (VALTREX) 1000 mg tablet Take one tablet twice daily for 2 days as needed for cold sores 30 tablet 4     No facility-administered encounter medications on file as of 10/22/2024.             O:   NAD, WDWN, Alert & Oriented, Mood & Affect wnl, Vitals stable   General appearance normal   Vitals stable   Alert, oriented and in no acute distress        Stuck on papules and brown macules on trunk and ext   Red papules on trunk  Flesh colored papules on trunk     The remainder of the full exam was normal; the following areas were examined:  conjunctiva/lids, , neck, peripheral vascular system, abdomen, lymph nodes, digits/nails, eccrine and apocrine glands, scalp/hair, face, neck, chest, abdomen, buttocks, back, RUE, LUE, RLE, LLE       Eyes: Conjunctivae/lids:Normal     ENT: Lips, mucosa: normal    MSK:Normal    Cardiovascular: peripheral edema none    Pulm: Breathing Normal    Lymph Nodes: No Head and Neck Lymphadenopathy     Neuro/Psych: Orientation:Alert and Orientedx3 ; Mood/Affect:normal       A/P:  1. Seborrheic keratosis, lentigo, angioma, dermal nevus  It was a pleasure speaking to Arabella Rodríguez today.  Previous clinic notes and pertinent laboratory tests were reviewed prior to Arabella Rodríguez's visit.  Signs and Symptoms of skin cancer discussed with patient.  Patient encouraged to perform monthly skin exams.  UV precautions reviewed with patient.  Risks of non-melanoma skin cancer discussed with patient   Return to clinic 12 months

## 2024-10-22 NOTE — LETTER
10/22/2024      Arabella Rodríguez  [address] [place]  [facility] [address]      Dear Colleague,    Thank you for referring your patient, Arabella Rodríguez, to the [facility]. Please see a copy of my visit note below.    Arabella Rodríguez is an extremely pleasant 74 year old year old female patient here today for spots on skin.  Patient has no other skin complaints today.  Remainder of the HPI, Meds, PMH, Allergies, FH, and SH was reviewed in chart.      Past Medical History:   Diagnosis Date     Arthritis Repa [place] 2016     Hypertension      Osteopenia      Thyroid nodules [facility]     benign        Past Surgical History:   Procedure Laterality Date     ABDOMEN SURGERY  1986         BIOPSY  2014    Thyroid     C ANESTH, SECTION       COLONOSCOPY       COLONOSCOPY N/A 2018    Procedure: COLONOSCOPY;  colonoscopy;  Surgeon: Xu Feliciano MD;  Location: [facility]     EXCISE MASS WRIST Left 2016    Procedure: EXCISE MASS WRIST;  Surgeon: Germain Hull MD;  Location: [facility]     ORTHOPEDIC SURGERY  2016    I had a benign tumor removed from my wrist.     TONSILLECTOMY          Family History   Problem Relation Age of Onset     Lipids Mother      Arthritis Mother      Hypertension Mother      Hyperlipidemia Mother      Osteopenia Mother      Macular Degeneration Mother 90     Hypertension Father      Hyperlipidemia Father      Prostate Cancer Father      Depression Sister         early      Depression Sister         early      Neurologic Disorder Brother         brain tumor     Other Cancer Brother          Brain Surgery/ Brain Surgery     Other Cancer Brother          Brain Surgery;  Recurrence     Hypertension Paternal Grandfather      Depression Niece      Depression Niece              Diabetes No family hx of      Thyroid Cancer No family hx of      Thyroid Disease No family hx of        Social  History     Socioeconomic History     Marital status:      Spouse name: Not on file     Number of children: Not on file     Years of education: Not on file     Highest education level: Not on file   Occupational History     Employer: [employer]   Tobacco Use     Smoking status: Former     Current packs/day: 0.00     Average packs/day: 0.5 packs/day for 9.0 years (4.5 ttl pk-yrs)     Types: Cigarettes     Start date: 10/1/1972     Quit date: 1981     Years since quittin.5     Smokeless tobacco: Never     Tobacco comments:     I quit in    Vaping Use     Vaping status: Never Used   Substance and Sexual Activity     Alcohol use: Yes     Comment: 1 o 2 then switch  to na     Drug use: No     Sexual activity: Not Currently     Partners: Male     Birth control/protection: Post-menopausal   Other Topics Concern     Parent/sibling w/ CABG, MI or angioplasty before 65F 55M? No      Service No     Blood Transfusions No     Caffeine Concern Yes     Comment: 3 cups cofffee a day     Occupational Exposure No     Hobby Hazards No     Sleep Concern No     Stress Concern No     Weight Concern No     Special Diet Yes     Comment: calcium with D one a day     Back Care No     Exercise Yes     Comment: couple times a wk     Bike Helmet No     Seat Belt Yes     Self-Exams No   Social History Narrative     Not on file     Social Determinants of Health     Financial Resource Strain: Low Risk  (2024)    Financial Resource Strain      Within the past 12 months, have you or your family members you live with been unable to get utilities (heat, electricity) when it was really needed?: No   Food Insecurity: Low Risk  (2024)    Food Insecurity      Within the past 12 months, did you worry that your food would run out before you got money to buy more?: No      Within the past 12 months, did the food you bought just not last and you didn t have money to get more?: No   Transportation Needs: Low  Risk  (9/9/2024)    Transportation Needs      Within the past 12 months, has lack of transportation kept you from medical appointments, getting your medicines, non-medical meetings or appointments, work, or from getting things that you need?: No   Physical Activity: Insufficiently Active (9/9/2024)    Exercise Vital Sign      Days of Exercise per Week: 5 days      Minutes of Exercise per Session: 20 min   Stress: No Stress Concern Present (9/9/2024)    [place] [employer] - Occupational Stress Questionnaire      Feeling of Stress : Only a little   Social Connections: Unknown (9/9/2024)    Social Connection and Isolation Panel [NHANES]      Frequency of Communication with Friends and Family: Not on file      Frequency of Social Gatherings with Friends and Family: More than three times a week      Attends Rastafari Services: Not on file      Active Member of Clubs or Organizations: Not on file      Attends Club or Organization Meetings: Not on file      Marital Status: Not on file   Interpersonal Safety: Low Risk  (9/12/2024)    Interpersonal Safety      Do you feel physically and emotionally safe where you currently live?: Yes      Within the past 12 months, have you been hit, slapped, kicked or otherwise physically hurt by someone?: No      Within the past 12 months, have you been humiliated or emotionally abused in other ways by your partner or ex-partner?: No   Housing Stability: Low Risk  (9/9/2024)    Housing Stability      Do you have housing? : Yes      Are you worried about losing your housing?: No       Outpatient Encounter Medications as of 10/22/2024   Medication Sig Dispense Refill     amLODIPine (NORVASC) 2.5 MG tablet Take 1 tablet (2.5 mg) by mouth daily. 90 tablet 3     calcium citrate-vitamin D (CITRACAL) 315-6.25 MG-MCG TABS per tablet Take 1 tablet by mouth daily 1 per day and has 500mg of calcium       diazepam (VALIUM) 2 MG tablet 2 mg about 30 minutes before procedure.  May  repeat x 1 .  Must have . (Patient not taking: Reported on 9/12/2024) 2 tablet 0     ezetimibe (ZETIA) 10 MG tablet Take 0.5 tablets (5 mg) by mouth daily. 45 tablet 0     fluticasone (FLONASE) 50 MCG/ACT nasal spray Spray 2 sprays into both nostrils daily. 48 g 2     losartan-hydrochlorothiazide (HYZAAR) 100-25 MG tablet TAKE ONE TABLET BY MOUTH ONCE DAILY 90 tablet 3     MULTI-VITAMIN OR 1 TABLET ORALLY DAILY       triamcinolone (KENALOG) 0.1 % external cream Apply topically 2 times daily (Patient not taking: Reported on 9/6/2023) 30 g 0     UNABLE TO FIND MEDICATION NAME: preservision ared2 generic (Patient not taking: Reported on 9/12/2024)       valACYclovir (VALTREX) 1000 mg tablet Take one tablet twice daily for 2 days as needed for cold sores 30 tablet 4     No facility-administered encounter medications on file as of 10/22/2024.             O:   NAD, WDWN, Alert & Oriented, Mood & Affect wnl, Vitals stable   General appearance normal   Vitals stable   Alert, oriented and in no acute distress        Stuck on papules and brown macules on trunk and ext   Red papules on trunk  Flesh colored papules on trunk     The remainder of the full exam was normal; the following areas were examined:  conjunctiva/lids, , neck, peripheral vascular system, abdomen, lymph nodes, digits/nails, eccrine and apocrine glands, scalp/hair, face, neck, chest, abdomen, buttocks, back, RUE, LUE, RLE, LLE       Eyes: Conjunctivae/lids:Normal     ENT: Lips, mucosa: normal    MSK:Normal    Cardiovascular: peripheral edema none    Pulm: Breathing Normal    Lymph Nodes: No Head and Neck Lymphadenopathy     Neuro/Psych: Orientation:Alert and Orientedx3 ; Mood/Affect:normal       A/P:  1. Seborrheic keratosis, lentigo, angioma, dermal nevus  It was a pleasure speaking to Arabella Rodríguez today.  Previous clinic notes and pertinent laboratory tests were reviewed prior to Arabella Rodríguez's visit.  Signs and Symptoms of skin cancer  discussed with patient.  Patient encouraged to perform monthly skin exams.  UV precautions reviewed with patient.  Risks of non-melanoma skin cancer discussed with patient   Return to clinic 12 months      Again, thank you for allowing me to participate in the care of your patient.        Sincerely,        Elkin Hooks MD

## 2024-10-28 LAB — SCANNED LAB RESULT: NORMAL

## 2024-10-30 DIAGNOSIS — R89.6 ABNORMAL CYTOLOGY: ICD-10-CM

## 2024-10-30 DIAGNOSIS — E04.2 MULTIPLE THYROID NODULES: Primary | ICD-10-CM

## 2024-12-09 ENCOUNTER — LAB (OUTPATIENT)
Dept: LAB | Facility: CLINIC | Age: 74
End: 2024-12-09
Payer: COMMERCIAL

## 2024-12-09 ENCOUNTER — HOSPITAL ENCOUNTER (OUTPATIENT)
Dept: BONE DENSITY | Facility: CLINIC | Age: 74
Discharge: HOME | End: 2024-12-09
Payer: COMMERCIAL

## 2024-12-09 ENCOUNTER — MYC REFILL (OUTPATIENT)
Dept: FAMILY MEDICINE | Facility: CLINIC | Age: 74
End: 2024-12-09

## 2024-12-09 DIAGNOSIS — E78.5 HYPERLIPIDEMIA LDL GOAL <100: ICD-10-CM

## 2024-12-09 DIAGNOSIS — M85.9 LOW BONE DENSITY: ICD-10-CM

## 2024-12-09 DIAGNOSIS — Z78.0 POSTMENOPAUSAL STATUS: ICD-10-CM

## 2024-12-09 LAB
CHOLEST SERPL-MCNC: 233 MG/DL
FASTING STATUS PATIENT QL REPORTED: YES
HDLC SERPL-MCNC: 62 MG/DL
LDLC SERPL CALC-MCNC: 134 MG/DL
NONHDLC SERPL-MCNC: 171 MG/DL
TRIGL SERPL-MCNC: 183 MG/DL

## 2024-12-09 PROCEDURE — 80061 LIPID PANEL: CPT

## 2024-12-09 PROCEDURE — 77080 DXA BONE DENSITY AXIAL: CPT

## 2024-12-09 PROCEDURE — 36415 COLL VENOUS BLD VENIPUNCTURE: CPT

## 2024-12-09 RX ORDER — EZETIMIBE 10 MG/1
5 TABLET ORAL DAILY
Qty: 45 TABLET | Refills: 0 | OUTPATIENT
Start: 2024-12-09

## 2024-12-10 RX ORDER — EZETIMIBE 10 MG/1
5 TABLET ORAL DAILY
Qty: 45 TABLET | Refills: 2 | Status: SHIPPED | OUTPATIENT
Start: 2024-12-10

## 2025-07-01 DIAGNOSIS — I10 BENIGN ESSENTIAL HYPERTENSION: ICD-10-CM

## 2025-07-01 RX ORDER — LOSARTAN POTASSIUM AND HYDROCHLOROTHIAZIDE 25; 100 MG/1; MG/1
1 TABLET ORAL DAILY
Qty: 90 TABLET | Refills: 0 | Status: SHIPPED | OUTPATIENT
Start: 2025-07-01

## 2025-08-11 DIAGNOSIS — E78.5 HYPERLIPIDEMIA LDL GOAL <100: ICD-10-CM

## 2025-08-11 RX ORDER — EZETIMIBE 10 MG/1
5 TABLET ORAL DAILY
Qty: 45 TABLET | Refills: 0 | Status: SHIPPED | OUTPATIENT
Start: 2025-08-11

## 2025-08-28 ENCOUNTER — LAB (OUTPATIENT)
Dept: LAB | Facility: CLINIC | Age: 75
End: 2025-08-28
Payer: COMMERCIAL

## 2025-08-28 DIAGNOSIS — E04.1 THYROID NODULE: ICD-10-CM

## 2025-08-28 DIAGNOSIS — E78.5 HYPERLIPIDEMIA LDL GOAL <100: ICD-10-CM

## 2025-08-28 LAB
ALBUMIN SERPL BCG-MCNC: 4.6 G/DL (ref 3.5–5.2)
ALP SERPL-CCNC: 53 U/L (ref 40–150)
ALT SERPL W P-5'-P-CCNC: 25 U/L (ref 0–50)
ANION GAP SERPL CALCULATED.3IONS-SCNC: 12 MMOL/L (ref 7–15)
AST SERPL W P-5'-P-CCNC: 32 U/L (ref 0–45)
BILIRUB SERPL-MCNC: 0.5 MG/DL
BUN SERPL-MCNC: 13.9 MG/DL (ref 8–23)
CALCIUM SERPL-MCNC: 9.6 MG/DL (ref 8.8–10.4)
CHLORIDE SERPL-SCNC: 99 MMOL/L (ref 98–107)
CHOLEST SERPL-MCNC: 220 MG/DL
CREAT SERPL-MCNC: 0.73 MG/DL (ref 0.51–0.95)
EGFRCR SERPLBLD CKD-EPI 2021: 86 ML/MIN/1.73M2
FASTING STATUS PATIENT QL REPORTED: YES
FASTING STATUS PATIENT QL REPORTED: YES
GLUCOSE SERPL-MCNC: 103 MG/DL (ref 70–99)
HCO3 SERPL-SCNC: 25 MMOL/L (ref 22–29)
HDLC SERPL-MCNC: 60 MG/DL
LDLC SERPL CALC-MCNC: 134 MG/DL
NONHDLC SERPL-MCNC: 160 MG/DL
POTASSIUM SERPL-SCNC: 4 MMOL/L (ref 3.4–5.3)
PROT SERPL-MCNC: 7.1 G/DL (ref 6.4–8.3)
SODIUM SERPL-SCNC: 136 MMOL/L (ref 135–145)
TRIGL SERPL-MCNC: 132 MG/DL
TSH SERPL DL<=0.005 MIU/L-ACNC: 2.64 UIU/ML (ref 0.3–4.2)

## (undated) RX ORDER — LIDOCAINE HYDROCHLORIDE 10 MG/ML
INJECTION, SOLUTION EPIDURAL; INFILTRATION; INTRACAUDAL; PERINEURAL
Status: DISPENSED
Start: 2024-10-01

## (undated) RX ORDER — GLYCOPYRROLATE 0.2 MG/ML
INJECTION, SOLUTION INTRAMUSCULAR; INTRAVENOUS
Status: DISPENSED
Start: 2018-01-31

## (undated) RX ORDER — LIDOCAINE HYDROCHLORIDE 10 MG/ML
INJECTION, SOLUTION EPIDURAL; INFILTRATION; INTRACAUDAL; PERINEURAL
Status: DISPENSED
Start: 2019-10-16

## (undated) RX ORDER — LIDOCAINE HYDROCHLORIDE 10 MG/ML
INJECTION, SOLUTION EPIDURAL; INFILTRATION; INTRACAUDAL; PERINEURAL
Status: DISPENSED
Start: 2018-01-31